# Patient Record
Sex: MALE | Race: WHITE | NOT HISPANIC OR LATINO | Employment: OTHER | URBAN - METROPOLITAN AREA
[De-identification: names, ages, dates, MRNs, and addresses within clinical notes are randomized per-mention and may not be internally consistent; named-entity substitution may affect disease eponyms.]

---

## 2017-02-21 DIAGNOSIS — R35.1 NOCTURIA: ICD-10-CM

## 2017-02-21 DIAGNOSIS — Z12.5 SPECIAL SCREENING FOR MALIGNANT NEOPLASM OF PROSTATE: ICD-10-CM

## 2017-02-21 DIAGNOSIS — M81.0 OSTEOPOROSIS: Primary | ICD-10-CM

## 2017-02-21 DIAGNOSIS — Z13.220 SCREENING FOR LIPID DISORDERS: ICD-10-CM

## 2017-02-27 LAB
25(OH)D3+25(OH)D2 SERPL-MCNC: 20.3 NG/ML (ref 30–100)
ALBUMIN SERPL-MCNC: 4.4 G/DL (ref 3.5–5.2)
ALBUMIN/GLOB SERPL: 2.2 G/DL
ALP SERPL-CCNC: 57 U/L (ref 39–117)
ALT SERPL-CCNC: 45 U/L (ref 1–41)
AST SERPL-CCNC: 31 U/L (ref 1–40)
BILIRUB SERPL-MCNC: 0.6 MG/DL (ref 0.1–1.2)
BUN SERPL-MCNC: 9 MG/DL (ref 8–23)
BUN/CREAT SERPL: 8.3 (ref 7–25)
CALCIUM SERPL-MCNC: 10.7 MG/DL (ref 8.6–10.5)
CHLORIDE SERPL-SCNC: 106 MMOL/L (ref 98–107)
CHOLEST SERPL-MCNC: 160 MG/DL (ref 0–200)
CO2 SERPL-SCNC: 28.7 MMOL/L (ref 22–29)
CREAT SERPL-MCNC: 1.08 MG/DL (ref 0.76–1.27)
GLOBULIN SER CALC-MCNC: 2 GM/DL
GLUCOSE SERPL-MCNC: 91 MG/DL (ref 65–99)
HDLC SERPL-MCNC: 65 MG/DL (ref 40–60)
LDLC SERPL CALC-MCNC: 77 MG/DL (ref 0–100)
LDLC/HDLC SERPL: 1.19 {RATIO}
POTASSIUM SERPL-SCNC: 4.3 MMOL/L (ref 3.5–5.2)
PROT SERPL-MCNC: 6.4 G/DL (ref 6–8.5)
PSA SERPL-MCNC: 2.35 NG/ML (ref 0–4)
SODIUM SERPL-SCNC: 146 MMOL/L (ref 136–145)
TRIGL SERPL-MCNC: 88 MG/DL (ref 0–150)
VLDLC SERPL CALC-MCNC: 17.6 MG/DL (ref 5–40)

## 2017-03-02 ENCOUNTER — OFFICE VISIT (OUTPATIENT)
Dept: FAMILY MEDICINE CLINIC | Facility: CLINIC | Age: 72
End: 2017-03-02

## 2017-03-02 VITALS
WEIGHT: 188 LBS | DIASTOLIC BLOOD PRESSURE: 70 MMHG | RESPIRATION RATE: 16 BRPM | TEMPERATURE: 97.6 F | SYSTOLIC BLOOD PRESSURE: 110 MMHG | BODY MASS INDEX: 25.47 KG/M2 | HEIGHT: 72 IN

## 2017-03-02 DIAGNOSIS — R74.01 ELEVATED ALT MEASUREMENT: ICD-10-CM

## 2017-03-02 DIAGNOSIS — E83.52 CALCIUM BLOOD INCREASED: ICD-10-CM

## 2017-03-02 DIAGNOSIS — M81.0 OSTEOPOROSIS: Primary | ICD-10-CM

## 2017-03-02 DIAGNOSIS — E55.9 VITAMIN D DEFICIENCY: ICD-10-CM

## 2017-03-02 DIAGNOSIS — M50.30 DEGENERATION OF INTERVERTEBRAL DISC OF CERVICAL REGION: ICD-10-CM

## 2017-03-02 PROCEDURE — 99213 OFFICE O/P EST LOW 20 MIN: CPT

## 2017-03-02 NOTE — PROGRESS NOTES
Subjective   Kendall Her is a 71 y.o. male. Patient is here today vitain D deficieyfor   Chief Complaint   Patient presents with   • Follow-up     screening lab results; osteoporosis          Vitals:    03/02/17 0846   BP: 110/70   Resp: 16   Temp: 97.6 °F (36.4 °C)     The following portions of the patient's history were reviewed and updated as appropriate: allergies, current medications, past family history, past medical history, past social history, past surgical history and problem list.    Past Medical History   Diagnosis Date   • Osteopenia      lumbar spine   • Osteoporosis      left femoral neck   • Shoulder pain, left       No Known Allergies   Social History     Social History   • Marital status:      Spouse name: N/A   • Number of children: N/A   • Years of education: N/A     Occupational History   • Not on file.     Social History Main Topics   • Smoking status: Never Smoker   • Smokeless tobacco: Not on file   • Alcohol use Yes   • Drug use: Not on file   • Sexual activity: Not on file     Other Topics Concern   • Not on file     Social History Narrative      No current outpatient prescriptions on file.     Objective     History of Present Illness   The patient is here today for follow-up on osteoporosis, vitamin D deficiency, chronic neck pain, and diet-controlled hyperlipidemia    Review of Systems   Constitutional: Negative.    HENT: Negative.    Respiratory: Negative for cough, shortness of breath and wheezing.    Cardiovascular: Negative for chest pain, palpitations and leg swelling.   Gastrointestinal: Negative for abdominal pain, blood in stool and nausea.   Genitourinary:        Mild urinary hesitancy but no nocturia   Musculoskeletal:        Occasional left inner thigh pain, which is mild in nature   Neurological: Negative.    Psychiatric/Behavioral: Negative.        Physical Exam   Constitutional: He appears well-developed and well-nourished.   Mildly overweight   Neck:   Carotid  pulses normal   Cardiovascular: Normal rate, regular rhythm and normal heart sounds.    Pulmonary/Chest: Effort normal and breath sounds normal. No respiratory distress. He has no wheezes. He has no rales.   Abdominal: Soft. Bowel sounds are normal.   Musculoskeletal: Normal range of motion.   No direct tenderness in the left thigh area.  No varicosities in the thigh but he does have varicosities in the lower leg which are not inflamed or tender.  Pulses normal   Psychiatric: He has a normal mood and affect.   Nursing note and vitals reviewed.      ASSESSMENT  #1 osteoporosis         #2 vitamin D deficiency         #3 diet-controlled hyperlipidemia          #4 frequent neck pain secondary to degenerative disc disease of the cervical spine         Discu.SSION/SUMMARY    the patient's vital signs are normal today.  The CMP was normal except for slightly elevated sodium of 146, slightly elevated calcium at 10.7, and slightly elevated ALT at 45.. Total cholesterol is 160, triglycerides 88, HDL cholesterol 65, and LDL cholesterol is 77.  PSA remains normal at 2.350.  Vitamin D level is still low at 20.3 despite the patient taking a combination calcium and vitamin D pill daily.  I have asked him to take an additional vitamin D3 pill, 2000 units, daily.  The patient does have some mild intermittent left inner thigh discomfort which does not sound to be a radiculopathy nor vascular insufficiency type of problem.  The patient will let us know if these symptoms worsen.  I did recommend that he have vascular screening tests done at [facility]  .    PLAN   CMP, vitamin D, nonfasting lab only in 3 months   No Follow-up on file.

## 2017-05-31 DIAGNOSIS — E55.9 VITAMIN D DEFICIENCY: Primary | ICD-10-CM

## 2017-05-31 DIAGNOSIS — M81.0 OSTEOPOROSIS: ICD-10-CM

## 2017-06-02 LAB
25(OH)D3+25(OH)D2 SERPL-MCNC: 31.3 NG/ML (ref 30–100)
ALBUMIN SERPL-MCNC: 4.5 G/DL (ref 3.5–5.2)
ALBUMIN/GLOB SERPL: 2 G/DL
ALP SERPL-CCNC: 59 U/L (ref 39–117)
ALT SERPL-CCNC: 32 U/L (ref 1–41)
AST SERPL-CCNC: 29 U/L (ref 1–40)
BILIRUB SERPL-MCNC: 0.7 MG/DL (ref 0.1–1.2)
BUN SERPL-MCNC: 11 MG/DL (ref 8–23)
BUN/CREAT SERPL: 10.9 (ref 7–25)
CALCIUM SERPL-MCNC: 10.9 MG/DL (ref 8.6–10.5)
CHLORIDE SERPL-SCNC: 106 MMOL/L (ref 98–107)
CO2 SERPL-SCNC: 27.7 MMOL/L (ref 22–29)
CREAT SERPL-MCNC: 1.01 MG/DL (ref 0.76–1.27)
GLOBULIN SER CALC-MCNC: 2.2 GM/DL
GLUCOSE SERPL-MCNC: 73 MG/DL (ref 65–99)
POTASSIUM SERPL-SCNC: 3.8 MMOL/L (ref 3.5–5.2)
PROT SERPL-MCNC: 6.7 G/DL (ref 6–8.5)
SODIUM SERPL-SCNC: 145 MMOL/L (ref 136–145)

## 2017-06-13 ENCOUNTER — TELEPHONE (OUTPATIENT)
Dept: FAMILY MEDICINE CLINIC | Facility: CLINIC | Age: 72
End: 2017-06-13

## 2017-06-13 NOTE — TELEPHONE ENCOUNTER
Patient notified    ----- Message from Ron Baires MD sent at 6/8/2017  1:26 PM EDT -----  Please tell patient that his vitamin D level has improved to just into the normal range but he definitely needs to continue taking vitamin D on a regular basis.  Tell him that his calcium level was very slightly elevated but his liver tests are both now normal.  I would like to recheck a vitamin D level and a CMP in 6 months

## 2018-12-27 DIAGNOSIS — E78.00 ELEVATED CHOLESTEROL: Primary | ICD-10-CM

## 2018-12-27 DIAGNOSIS — M85.80 OSTEOPENIA, UNSPECIFIED LOCATION: ICD-10-CM

## 2018-12-28 LAB
25(OH)D3+25(OH)D2 SERPL-MCNC: 40.6 NG/ML (ref 30–100)
ALBUMIN SERPL-MCNC: 3.9 G/DL (ref 3.5–5.2)
ALBUMIN/GLOB SERPL: 1.6 G/DL
ALP SERPL-CCNC: 68 U/L (ref 39–117)
ALT SERPL-CCNC: 35 U/L (ref 1–41)
AST SERPL-CCNC: 24 U/L (ref 1–40)
BILIRUB SERPL-MCNC: 0.4 MG/DL (ref 0.1–1.2)
BUN SERPL-MCNC: 14 MG/DL (ref 8–23)
BUN/CREAT SERPL: 13.2 (ref 7–25)
CALCIUM SERPL-MCNC: 10.9 MG/DL (ref 8.6–10.5)
CHLORIDE SERPL-SCNC: 106 MMOL/L (ref 98–107)
CHOLEST SERPL-MCNC: 146 MG/DL (ref 0–200)
CO2 SERPL-SCNC: 29.3 MMOL/L (ref 22–29)
CREAT SERPL-MCNC: 1.06 MG/DL (ref 0.76–1.27)
GLOBULIN SER CALC-MCNC: 2.4 GM/DL
GLUCOSE SERPL-MCNC: 86 MG/DL (ref 65–99)
HDLC SERPL-MCNC: 54 MG/DL (ref 40–60)
LDLC SERPL CALC-MCNC: 75 MG/DL (ref 0–100)
LDLC/HDLC SERPL: 1.38 {RATIO}
POTASSIUM SERPL-SCNC: 4.6 MMOL/L (ref 3.5–5.2)
PROT SERPL-MCNC: 6.3 G/DL (ref 6–8.5)
SODIUM SERPL-SCNC: 144 MMOL/L (ref 136–145)
TRIGL SERPL-MCNC: 87 MG/DL (ref 0–150)
VLDLC SERPL CALC-MCNC: 17.4 MG/DL (ref 5–40)

## 2019-01-08 ENCOUNTER — OFFICE VISIT (OUTPATIENT)
Dept: FAMILY MEDICINE CLINIC | Facility: CLINIC | Age: 74
End: 2019-01-08

## 2019-01-08 ENCOUNTER — RESULTS ENCOUNTER (OUTPATIENT)
Dept: FAMILY MEDICINE CLINIC | Facility: CLINIC | Age: 74
End: 2019-01-08

## 2019-01-08 VITALS
HEART RATE: 67 BPM | OXYGEN SATURATION: 99 % | WEIGHT: 185 LBS | TEMPERATURE: 97.4 F | DIASTOLIC BLOOD PRESSURE: 68 MMHG | BODY MASS INDEX: 25.06 KG/M2 | HEIGHT: 72 IN | RESPIRATION RATE: 18 BRPM | SYSTOLIC BLOOD PRESSURE: 104 MMHG

## 2019-01-08 DIAGNOSIS — M81.0 OSTEOPOROSIS, UNSPECIFIED OSTEOPOROSIS TYPE, UNSPECIFIED PATHOLOGICAL FRACTURE PRESENCE: ICD-10-CM

## 2019-01-08 DIAGNOSIS — E55.9 VITAMIN D DEFICIENCY: ICD-10-CM

## 2019-01-08 DIAGNOSIS — Z12.5 SPECIAL SCREENING FOR MALIGNANT NEOPLASM OF PROSTATE: ICD-10-CM

## 2019-01-08 DIAGNOSIS — Z11.59 NEED FOR HEPATITIS C SCREENING TEST: ICD-10-CM

## 2019-01-08 DIAGNOSIS — Z12.11 SCREENING FOR COLON CANCER: ICD-10-CM

## 2019-01-08 DIAGNOSIS — E83.52 HYPERCALCEMIA: Primary | ICD-10-CM

## 2019-01-08 DIAGNOSIS — E78.5 DIET-CONTROLLED HYPERLIPIDEMIA: ICD-10-CM

## 2019-01-08 DIAGNOSIS — E83.52 CALCIUM BLOOD INCREASED: ICD-10-CM

## 2019-01-08 PROBLEM — G47.62 NOCTURNAL LEG CRAMPS: Status: ACTIVE | Noted: 2019-01-08

## 2019-01-08 PROBLEM — R74.01 ELEVATED ALT MEASUREMENT: Status: RESOLVED | Noted: 2017-03-02 | Resolved: 2019-01-08

## 2019-01-08 PROCEDURE — 99213 OFFICE O/P EST LOW 20 MIN: CPT

## 2019-01-08 NOTE — PROGRESS NOTES
Tayo Her is a 73 y.o. male. Patient is here today for No chief complaint on file.         Vitals:    01/08/19 0911   BP: 104/68   Pulse: 67   Resp: 18   Temp: 97.4 °F (36.3 °C)   SpO2: 99%     The following portions of the patient's history were reviewed and updated as appropriate: allergies, current medications, past family history, past medical history, past social history, past surgical history and problem list.    Past Medical History:   Diagnosis Date   • Osteopenia     lumbar spine   • Osteoporosis     left femoral neck   • Shoulder pain, left       No Known Allergies   Social History     Socioeconomic History   • Marital status:      Spouse name: Not on file   • Number of children: Not on file   • Years of education: Not on file   • Highest education level: Not on file   Social Needs   • Financial resource strain: Not on file   • Food insecurity - worry: Not on file   • Food insecurity - inability: Not on file   • Transportation needs - medical: Not on file   • Transportation needs - non-medical: Not on file   Occupational History   • Not on file   Tobacco Use   • Smoking status: Never Smoker   Substance and Sexual Activity   • Alcohol use: Yes   • Drug use: Not on file   • Sexual activity: Not on file   Other Topics Concern   • Not on file   Social History Narrative   • Not on file      No current outpatient medications on file.     Objective     History of Present Illness   The patient is here today for follow-up on diet-controlled hypercholesterolemia, vitamin D deficiency, hypercalcemia, and osteoporosis    Review of Systems   Constitutional: Negative for activity change, appetite change, chills and fatigue.   HENT: Negative.    Respiratory: Negative for cough, shortness of breath and wheezing.    Cardiovascular: Negative for chest pain, palpitations and leg swelling.   Gastrointestinal: Negative for abdominal pain, blood in stool, constipation and diarrhea.   Genitourinary:  Negative.    Musculoskeletal:        The patient states that he is having fairly frequent nocturnal leg cramps in the calf muscles recently   Neurological: Negative for dizziness, weakness, numbness and headaches.   Hematological: Negative.    Psychiatric/Behavioral: Negative.        Physical Exam   Constitutional: He is oriented to person, place, and time. He appears well-developed and well-nourished.   Eyes: Pupils are equal, round, and reactive to light.   Neck: No thyromegaly present.   Carotid pulses normal   Cardiovascular: Normal rate, regular rhythm and normal heart sounds.   Pulmonary/Chest: Effort normal and breath sounds normal. No respiratory distress. He has no wheezes. He has no rales.   Abdominal: Soft. Bowel sounds are normal.   Musculoskeletal: Normal range of motion.   Neurological: He is alert and oriented to person, place, and time.   Skin: Skin is warm and dry.   Psychiatric: He has a normal mood and affect.   Nursing note reviewed.      ASSESSMENT  #1 diet-controlled hypercholesterolemia                    #2 vitamin D deficiency                     #3 mild hypercalcemia                  #4 osteoporosis                 #5 nocturnal leg cramps    DISCUSSION/SUMMARY    Patient's vital signs are normal.  CMP is normal except for elevated serum calcium level of 10.9.  The patient has had several mildly elevated calciums in the past.  Total cholesterol is 146, triglycerides 87, HDL cholesterol 54, and LDL cholesterol is 75.  Vitamin D level was 40.6.    The patient generally feels well but has had some difficulty with nocturnal leg cramps in the early morning hours.  I recommended to the patient that he try several ounces of tonic water in the evening and to gently increase his fluid intake during the day to see if it helps his leg cramps.    The patient was instructed to increase his vitamin D to 2000 international units daily instead of 1000 international units.  Today I am going to have the  patient have more labs drawn and we will do a parathyroid hormone level, PSA and hepatitis C antibody level.    The patient has never had colorectal  cancer screening and really does not want to have a colonoscopy.  I talked to the patient about the colo-guard stool test and he is willing to do this test.  We will submit his name to do the col[employer] company.    PLAN  Recheck in one year with CMP, lipid panel and PSA as well as vitamin D level  No Follow-up on file.

## 2019-01-09 LAB
HCV AB S/CO SERPL IA: <0.1 S/CO RATIO (ref 0–0.9)
PSA SERPL-MCNC: 3.71 NG/ML (ref 0–4)
PTH-INTACT SERPL-MCNC: 167 PG/ML (ref 15–65)

## 2019-01-16 ENCOUNTER — TELEPHONE (OUTPATIENT)
Dept: FAMILY MEDICINE CLINIC | Facility: CLINIC | Age: 74
End: 2019-01-16

## 2019-01-16 DIAGNOSIS — E34.9 ELEVATED PARATHYROID HORMONE: Primary | ICD-10-CM

## 2019-01-16 NOTE — TELEPHONE ENCOUNTER
Patient notified and is ok with seeing someone in Dr. Gordon's group.    ----- Message from Ron Baires MD sent at 1/15/2019  1:15 PM EST -----  Tell patient that his hepatitis C antibody level was normal.  Tell him that his PSA was still in the normal range at 3.7, less than 4 is normal.  Tell him that his parathyroid gland hormone level was elevated and this suggests that he may have an issue with his parathyroid glands which are behind his thyroid gland.  The appeared to be overactive as happens with benign tumors of these small glands, so we need to send him to someone to check on this and possibly remove all or some of these parathyroid glands.  If the patient is okay with this I will refer him to Dr. Gordon's group for evaluation.

## 2019-02-17 NOTE — PROGRESS NOTES
SURGERY  Kendall Her   1945 02/18/19    Chief Complaint:  hyperparathyroidism    HPI    Patient is a very nice 73 y.o. male referred by Dr Martinez Baires with osteoporosis, hypercalcemia, and vit D deficiency.  His symptoms include neck pain, which has been there for years, which lead to a bone density (~2016) with osteoporosis diagnosis.  After that diagnosis, he was taking 2000 units vit D and 1500 mg calcium daily.  His vit D in 2016 was low.  He has had several broken bones in his life, accidental, but 5 total.    Her calcium is elevated to 10.9, intact , vit D now corrected at 40.6.  His calcium has been elevated at least 3 years.      He denies fatigue although he does say that he is sleeping a little more.  He has never had any kidney stones, has not had pancreatitis, or issues with depression.  His kidney function is normal.    He has not had any workup as to the location of the parathyroid.  We discussed today that most individuals have 4 parathyroids and parathyroid disease is usually either a single adenoma or 4 glands with some element of hyperplasia.  We also discussed that hyperparathyroidism can be primary or secondary.    Past Medical History:   Diagnosis Date   • Colon cancer screening 01/14/2019    Cologuard testing: Negative results   • Osteopenia     lumbar spine   • Osteoporosis     left femoral neck   • Shoulder pain, left      No past surgical history on file.  Family History   Problem Relation Age of Onset   • Alzheimer's disease Mother    • Alzheimer's disease Father      Social History     Socioeconomic History   • Marital status:      Spouse name: Not on file   • Number of children: Not on file   • Years of education: Not on file   • Highest education level: Not on file   Social Needs   • Financial resource strain: Not on file   • Food insecurity - worry: Not on file   • Food insecurity - inability: Not on file   • Transportation needs - medical: Not on file   •  "Transportation needs - non-medical: Not on file   Occupational History   • Not on file   Tobacco Use   • Smoking status: Never Smoker   • Smokeless tobacco: Never Used   Substance and Sexual Activity   • Alcohol use: Yes     Alcohol/week: 1.8 - 3.0 oz     Types: 3 - 5 Cans of beer per week     Comment: weekly   • Drug use: No   • Sexual activity: Defer   Other Topics Concern   • Not on file   Social History Narrative   • Not on file         Current Outpatient Medications:   •  Calcium-Phosphorus-Vitamin D (CALCIUM GUMMIES PO), Take 2-3 each by mouth Daily., Disp: , Rfl:   •  Cholecalciferol (VITAMIN D3) 2000 units capsule, Take 2,000 Units by mouth Daily., Disp: , Rfl:   •  naproxen sodium (ALEVE) 220 MG tablet, Take 220 mg by mouth As Needed for Mild Pain  or Headache., Disp: , Rfl:     Allergies   Allergen Reactions   • Peanut-Containing Drug Products Swelling     Patient states after eating some (more than a few) his throat seems to contract     Review of Systems   HENT: Positive for dental problem, rhinorrhea, sneezing and sore throat.    Genitourinary: Positive for urgency.   Musculoskeletal: Positive for neck pain and neck stiffness.   All other systems reviewed and are negative.      Vitals:    02/18/19 1249   Pulse: 73   SpO2: 97%   Weight: 85 kg (187 lb 6.4 oz)   Height: 182.9 cm (72\")       PHYSICAL EXAM:    Pulse 73   Ht 182.9 cm (72\")   Wt 85 kg (187 lb 6.4 oz)   SpO2 97%   BMI 25.42 kg/m²   Body mass index is 25.42 kg/m².    Constitutional: well developed, well nourished, appears  healthy, appears younger than stated age  Eyes: sclera nonicteric, conjunctiva not injected   ENMT: Hearing intact, trachea midline, thyroid without masses, no excellent skin crease  CVS: RRR, no murmur, peripheral edema not present  Respiratory: CTA, normal respiratory effort   Gastrointestinal: no hepatosplenomegaly, abdomen soft, nontender, abdominal hernia not present, no guarding, no rebound   Genitourinary: inguinal " hernia not detected  Musculoskeletal: gait normal, muscle mass normal  Skin: warm and dry, no rashes visible  Neurological: awake and alert, seems to have reasonable capacity for understanding for medical decision making  Psychiatric: appears to have reasonable judgement, pleasant  Lymphatics: no cervical adenopathy    Radiographic Findings: Bone scan 2016 with osteoporosis    Lab Findings: Calcium 10.9, intact , vitamin D 40.6    Pamphlet reviewed: parathyroid surgery    IMPRESSION:  · Hyperparathyroidism, almost certainly primary  · Hypercalcemia, long-standing  · Vit d deficiency, now corrected.  · Osteoporosis, since at least 2016   · Symptoms of fatigue    PLAN:    · SPECT CT scan at [facility].  This is not offered at [facility] and will help us to proceed in a minimally invasive method.  Patient to phone for results.   The risk of parathyroid surgery were discussed with the patient including bleeding, infection, recurrent laryngeal nerve injury, hypocalcemia potentially necessitating temporary or permanent supplementation with calcium and/or activated vitamin D, with repeated labs.  We also discussed the accuracy rate of pre op studies not being ideal and the potential that the affected gland may not be located and hypercalcemia may remain.  Of course, scarring will be present.  · We discussed the intended outpatient nature of this procedure but if the gland cannot be found in the expected location, and/or bilateral exploration is need, there is the possibility of the need for an overnight stay.  In this case, supplementation of a more complex nature will be more likely and for a more extended period.  · In short I told him that he needs surgery, and discussed all the risks.  At this point, he likely will want to come back and discuss further after his SPECT-CT scan will have already talked to him about all of the issues about surgery and about the risks of proceeding and not proceeding.    Milagro Gordon,  MD  02/18/19  5:29 PM    Note started on the day prior to patient's visit, to afford a more efficient and personal visit for the patient    ADDEND  SPECT CT shows a 2 cm right superior parathyroid adenoma, by CT and activity, and a 9 mm left superior parathyroid adenoma by CT and activity.  He needs a follow up office visit to discuss.  Milagro Gordon MD  04/03/19

## 2019-02-18 ENCOUNTER — OFFICE VISIT (OUTPATIENT)
Dept: SURGERY | Facility: CLINIC | Age: 74
End: 2019-02-18

## 2019-02-18 VITALS — WEIGHT: 187.4 LBS | HEIGHT: 72 IN | HEART RATE: 73 BPM | OXYGEN SATURATION: 97 % | BODY MASS INDEX: 25.38 KG/M2

## 2019-02-18 DIAGNOSIS — M81.0 OSTEOPOROSIS, UNSPECIFIED OSTEOPOROSIS TYPE, UNSPECIFIED PATHOLOGICAL FRACTURE PRESENCE: ICD-10-CM

## 2019-02-18 DIAGNOSIS — E55.9 VITAMIN D DEFICIENCY: ICD-10-CM

## 2019-02-18 DIAGNOSIS — E83.52 CALCIUM BLOOD INCREASED: Primary | ICD-10-CM

## 2019-02-18 PROCEDURE — 99204 OFFICE O/P NEW MOD 45 MIN: CPT | Performed by: SURGERY

## 2019-02-18 RX ORDER — NAPROXEN SODIUM 220 MG
220 TABLET ORAL AS NEEDED
COMMUNITY
End: 2020-07-28

## 2019-02-18 RX ORDER — ACETAMINOPHEN 160 MG
2000 TABLET,DISINTEGRATING ORAL DAILY
COMMUNITY
End: 2020-01-14

## 2019-02-25 ENCOUNTER — TELEPHONE (OUTPATIENT)
Dept: SURGERY | Facility: CLINIC | Age: 74
End: 2019-02-25

## 2019-02-25 NOTE — TELEPHONE ENCOUNTER
ADDEND  Stop his calcium but continue vit D.  Milagro Gordon MD  Patient would like to know if he should still be taking vitamin D, and calcium? Please advise.

## 2019-04-14 PROBLEM — E21.0 HYPERPARATHYROID BONE DISEASE: Status: ACTIVE | Noted: 2019-04-14

## 2019-04-14 NOTE — H&P (VIEW-ONLY)
SURGERY  Kendall Her   1945  04/15/19    Chief Complaint:  hyperparathyroidism    HPI    Patient is a very nice 73 y.o. male, who is here in follow up after initial visit in February of this year, to discuss surgery for 2 parathyroid adenomas.  His symtpoms include osteoporosis, hypercalcemia, and vit D deficiency.  His symptoms include neck pain, which has been there for years, which lead to a bone density (~2016) with osteoporosis diagnosis.  After that diagnosis, he was taking 2000 units vit D and 1500 mg calcium daily.  His vit D in 2016 was low.  He has had several broken bones in his life, accidental, but 5 total.    His calcium is elevated to 10.9, intact , vit D now corrected at 40.6.  His calcium has been elevated at least 3 years.      He denies fatigue although he does say that he is sleeping a little more.  He has never had any kidney stones, has not had pancreatitis, or issues with depression.  His kidney function is normal.    We previously discussed that most individuals have 4 parathyroids and parathyroid disease is usually either a single adenoma or 4 glands with some element of hyperplasia.  We also discussed that hyperparathyroidism can be primary or secondary.  He went for a SPECT CT which showed a 2 cm right superior parathyroid adenoma, by CT and activity, and a 9 mm left superior parathyroid adenoma by CT and activity.  He had previously noted that he would want to return and thus is back to discuss    Past Medical History:   Diagnosis Date   • Colon cancer screening 01/14/2019    Cologuard testing: Negative results   • Osteopenia     lumbar spine   • Osteoporosis     left femoral neck   • Shoulder pain, left      No past surgical history on file.  Family History   Problem Relation Age of Onset   • Alzheimer's disease Mother    • Alzheimer's disease Father      Social History     Socioeconomic History   • Marital status:      Spouse name: Not on file   • Number of  "children: Not on file   • Years of education: Not on file   • Highest education level: Not on file   Tobacco Use   • Smoking status: Never Smoker   • Smokeless tobacco: Never Used   Substance and Sexual Activity   • Alcohol use: Yes     Alcohol/week: 1.8 - 3.0 oz     Types: 3 - 5 Cans of beer per week     Comment: weekly   • Drug use: No   • Sexual activity: Defer         Current Outpatient Medications:   •  Cholecalciferol (VITAMIN D3) 2000 units capsule, Take 2,000 Units by mouth Daily., Disp: , Rfl:   •  naproxen sodium (ALEVE) 220 MG tablet, Take 220 mg by mouth As Needed for Mild Pain  or Headache., Disp: , Rfl:     Allergies   Allergen Reactions   • Peanut-Containing Drug Products Swelling     Patient states after eating some (more than a few) his throat seems to contract     Review of Systems   HENT: Positive for dental problem, rhinorrhea, sneezing and sore throat.    Genitourinary: Positive for urgency.   Musculoskeletal: Positive for neck pain and neck stiffness.   All other systems reviewed and are negative.      Vitals:    04/15/19 1612   Pulse: 68   SpO2: 97%   Weight: 83.9 kg (185 lb)   Height: 182.9 cm (72\")       PHYSICAL EXAM:    Pulse 68   Ht 182.9 cm (72\")   Wt 83.9 kg (185 lb)   SpO2 97%   BMI 25.09 kg/m²   Body mass index is 25.09 kg/m².    Constitutional: well developed, well nourished, appears  healthy, appears younger than stated age  Eyes: sclera nonicteric, conjunctiva not injected   ENMT: Hearing intact, trachea midline, thyroid without masses, no excellent skin crease  CVS: RRR, no murmur, peripheral edema not present  Respiratory: CTA, normal respiratory effort   Gastrointestinal: no hepatosplenomegaly, abdomen soft, nontender, abdominal hernia not present, no guarding, no rebound   Genitourinary: inguinal hernia not detected  Musculoskeletal: gait normal, muscle mass normal  Skin: warm and dry, no rashes visible  Neurological: awake and alert, seems to have reasonable capacity for " understanding for medical decision making  Psychiatric: appears to have reasonable judgement, pleasant  Lymphatics: no cervical adenopathy    Radiographic Findings: Bone scan 2016 with osteoporosis    Lab Findings: Calcium 10.9, intact , vitamin D 40.6    Pamphlet reviewed: parathyroid surgery    IMPRESSION:  · Hyperparathyroidism, almost certainly primary.  · Giant 2 cm right superior parathyroid adenoma, residing behind the upper half of the right thyroid lobe.  · Mildly metabolic nearly 1 cm left superior cervical parathyroid adenoma behind the upper third of the left thyroid lobe.  · Hypercalcemia, long-standing  · Vit d deficiency, now corrected.  · Osteoporosis, since at least 2016   · Symptoms of fatigue    PLAN:    · Case request for right superior parathyroid and possibly left superior resection.  We had a long discussion today about whether we should just do 1 of these or do both, or do one and do an intraoperative lab waiting before exploring the other side.  He really does not want to be over aggressive and thus we decided that I would try to look for both but certainly would not go with the intention of biopsying all 4 glands, which will put the patient at increased risk for hypoparathyroidism postoperatively.  We also discussed the fact that he has neck pain, but he cannot tolerate his neck being flexed rather than extended which is the position we will have him in.  His wife was present for this discussion.  The risk of parathyroid surgery were discussed with the patient including bleeding, infection, recurrent laryngeal nerve injury, hypocalcemia potentially necessitating temporary or permanent supplementation with calcium and/or activated vitamin D, with repeated labs.  We also discussed the accuracy rate of pre op studies not being ideal and the potential that the affected gland may not be located and hypercalcemia may remain.  Of course, scarring will be present.  · We discussed the  intended outpatient nature of this procedure but if the gland cannot be found in the expected location, and/or bilateral exploration is need, there is the possibility of the need for an overnight stay.  In this case, supplementation of a more complex nature will be more likely and for a more extended period.    Milagro Gordon MD  4/15/2019  6:57 PM    Note started on the day prior to patient's visit, to afford a more efficient and personal visit for the patient    .

## 2019-04-15 ENCOUNTER — PREP FOR SURGERY (OUTPATIENT)
Dept: OTHER | Facility: HOSPITAL | Age: 74
End: 2019-04-15

## 2019-04-15 ENCOUNTER — OFFICE VISIT (OUTPATIENT)
Dept: SURGERY | Facility: CLINIC | Age: 74
End: 2019-04-15

## 2019-04-15 VITALS — OXYGEN SATURATION: 97 % | HEART RATE: 68 BPM | HEIGHT: 72 IN | WEIGHT: 185 LBS | BODY MASS INDEX: 25.06 KG/M2

## 2019-04-15 DIAGNOSIS — M81.0 OSTEOPOROSIS, UNSPECIFIED OSTEOPOROSIS TYPE, UNSPECIFIED PATHOLOGICAL FRACTURE PRESENCE: Primary | ICD-10-CM

## 2019-04-15 DIAGNOSIS — E83.52 CALCIUM BLOOD INCREASED: Primary | ICD-10-CM

## 2019-04-15 DIAGNOSIS — E21.0 HYPERPARATHYROID BONE DISEASE (HCC): ICD-10-CM

## 2019-04-15 DIAGNOSIS — E83.52 CALCIUM BLOOD INCREASED: ICD-10-CM

## 2019-04-15 PROCEDURE — 99214 OFFICE O/P EST MOD 30 MIN: CPT | Performed by: SURGERY

## 2019-04-15 RX ORDER — ACETAMINOPHEN 325 MG/1
650 TABLET ORAL ONCE
Status: CANCELLED | OUTPATIENT
Start: 2019-04-19 | End: 2019-04-15

## 2019-04-15 RX ORDER — OXYCODONE HCL 10 MG/1
10 TABLET, FILM COATED, EXTENDED RELEASE ORAL ONCE
Status: CANCELLED | OUTPATIENT
Start: 2019-04-19 | End: 2019-04-15

## 2019-04-15 RX ORDER — SODIUM CHLORIDE 0.9 % (FLUSH) 0.9 %
1-10 SYRINGE (ML) INJECTION AS NEEDED
Status: CANCELLED | OUTPATIENT
Start: 2019-04-19

## 2019-04-15 RX ORDER — SODIUM CHLORIDE 0.9 % (FLUSH) 0.9 %
3 SYRINGE (ML) INJECTION EVERY 12 HOURS SCHEDULED
Status: CANCELLED | OUTPATIENT
Start: 2019-04-19

## 2019-04-17 ENCOUNTER — APPOINTMENT (OUTPATIENT)
Dept: PREADMISSION TESTING | Facility: HOSPITAL | Age: 74
End: 2019-04-17

## 2019-04-17 ENCOUNTER — TELEPHONE (OUTPATIENT)
Dept: SURGERY | Facility: CLINIC | Age: 74
End: 2019-04-17

## 2019-04-17 VITALS
BODY MASS INDEX: 25.15 KG/M2 | RESPIRATION RATE: 16 BRPM | HEART RATE: 70 BPM | OXYGEN SATURATION: 97 % | WEIGHT: 185.7 LBS | HEIGHT: 72 IN | SYSTOLIC BLOOD PRESSURE: 113 MMHG | TEMPERATURE: 98.2 F | DIASTOLIC BLOOD PRESSURE: 74 MMHG

## 2019-04-17 DIAGNOSIS — E83.52 CALCIUM BLOOD INCREASED: ICD-10-CM

## 2019-04-17 DIAGNOSIS — E21.0 HYPERPARATHYROID BONE DISEASE (HCC): ICD-10-CM

## 2019-04-17 LAB
ANION GAP SERPL CALCULATED.3IONS-SCNC: 10.7 MMOL/L
BUN BLD-MCNC: 10 MG/DL (ref 8–23)
BUN/CREAT SERPL: 10.3 (ref 7–25)
CALCIUM SPEC-SCNC: 10.7 MG/DL (ref 8.6–10.5)
CALCIUM SPEC-SCNC: 11.3 MG/DL (ref 8.6–10.5)
CHLORIDE SERPL-SCNC: 104 MMOL/L (ref 98–107)
CO2 SERPL-SCNC: 25.3 MMOL/L (ref 22–29)
CREAT BLD-MCNC: 0.97 MG/DL (ref 0.76–1.27)
DEPRECATED RDW RBC AUTO: 38.6 FL (ref 37–54)
ERYTHROCYTE [DISTWIDTH] IN BLOOD BY AUTOMATED COUNT: 12.3 % (ref 12.3–15.4)
GFR SERPL CREATININE-BSD FRML MDRD: 76 ML/MIN/1.73
GLUCOSE BLD-MCNC: 83 MG/DL (ref 65–99)
HCT VFR BLD AUTO: 45.4 % (ref 37.5–51)
HGB BLD-MCNC: 15.4 G/DL (ref 13–17.7)
MCH RBC QN AUTO: 29.4 PG (ref 26.6–33)
MCHC RBC AUTO-ENTMCNC: 33.9 G/DL (ref 31.5–35.7)
MCV RBC AUTO: 86.8 FL (ref 79–97)
PHOSPHATE SERPL-MCNC: 1.9 MG/DL (ref 2.5–4.5)
PLATELET # BLD AUTO: 197 10*3/MM3 (ref 140–450)
PMV BLD AUTO: 11.1 FL (ref 6–12)
POTASSIUM BLD-SCNC: 3.9 MMOL/L (ref 3.5–5.2)
PTH-INTACT SERPL-MCNC: 172 PG/ML (ref 15–65)
RBC # BLD AUTO: 5.23 10*6/MM3 (ref 4.14–5.8)
SODIUM BLD-SCNC: 140 MMOL/L (ref 136–145)
WBC NRBC COR # BLD: 6.81 10*3/MM3 (ref 3.4–10.8)

## 2019-04-17 PROCEDURE — 84100 ASSAY OF PHOSPHORUS: CPT | Performed by: SURGERY

## 2019-04-17 PROCEDURE — 93010 ELECTROCARDIOGRAM REPORT: CPT | Performed by: INTERNAL MEDICINE

## 2019-04-17 PROCEDURE — 93005 ELECTROCARDIOGRAM TRACING: CPT

## 2019-04-17 PROCEDURE — 83970 ASSAY OF PARATHORMONE: CPT | Performed by: SURGERY

## 2019-04-17 PROCEDURE — 36415 COLL VENOUS BLD VENIPUNCTURE: CPT

## 2019-04-17 PROCEDURE — 85027 COMPLETE CBC AUTOMATED: CPT | Performed by: SURGERY

## 2019-04-17 PROCEDURE — 80048 BASIC METABOLIC PNL TOTAL CA: CPT | Performed by: SURGERY

## 2019-04-17 RX ORDER — CHLORHEXIDINE GLUCONATE 500 MG/1
1 CLOTH TOPICAL TAKE AS DIRECTED
COMMUNITY
End: 2019-04-19 | Stop reason: HOSPADM

## 2019-04-17 NOTE — DISCHARGE INSTRUCTIONS
Take the following medications the morning of surgery with a small sip of water:  NONE    Arrive to hospital on your day of surgery at 6:00 AM.    General Instructions:  • Do not eat solid food after midnight the night before surgery.  • You may drink clear liquids day of surgery but must stop at least one hour before your hospital arrival time.  • It is beneficial for you to have a clear drink that contains carbohydrates the day of surgery.  We suggest a 12 to 20 ounce bottle of Gatorade or Powerade for non-diabetic patients or a 12 to 20 ounce bottle of G2 or Powerade Zero for diabetic patients. (Pediatric patients, are not advised to drink a 12 to 20 ounce carbohydrate drink)    Clear liquids are liquids you can see through.  Nothing red in color.     Plain water                               Sports drinks  Sodas                                   Gelatin (Jell-O)  Fruit juices without pulp such as white grape juice and apple juice  Popsicles that contain no fruit or yogurt  Tea or coffee (no cream or milk added)  Gatorade / Powerade  G2 / Powerade Zero    • Infants may have breast milk up to four hours before surgery.  • Infants drinking formula may drink formula up to six hours before surgery.   • Patients who avoid smoking, chewing tobacco and alcohol for 4 weeks prior to surgery have a reduced risk of post-operative complications.  Quit smoking as many days before surgery as you can.  • Do not smoke, use chewing tobacco or drink alcohol the day of surgery.   • If applicable bring your C-PAP/ BI-PAP machine.  • Bring any papers given to you in the doctor’s office.  • Wear clean comfortable clothes and socks.  • Do not wear contact lenses, false eyelashes or make-up.  Bring a case for your glasses.   • Bring crutches or walker if applicable.  • Remove all piercings.  Leave jewelry and any other valuables at home.  • Hair extensions with metal clips must be removed prior to surgery.  • The Pre-Admission Testing  nurse will instruct you to bring medications if unable to obtain an accurate list in Pre-Admission Testing.        If you were given a blood bank ID arm band remember to bring it with you the day of surgery.    Preventing a Surgical Site Infection:  • For 2 to 3 days before surgery, avoid shaving with a razor because the razor can irritate skin and make it easier to develop an infection.    • Any areas of open skin can increase the risk of a post-operative wound infection by allowing bacteria to enter and travel throughout the body.  Notify your surgeon if you have any skin wounds / rashes even if it is not near the expected surgical site.  The area will need assessed to determine if surgery should be delayed until it is healed.  • The night prior to surgery sleep in a clean bed with clean clothing.  Do not allow pets to sleep with you.  • Shower on the morning of surgery using a fresh bar of anti-bacterial soap (such as Dial) and clean washcloth.  Dry with a clean towel and dress in clean clothing.  • Ask your surgeon if you will be receiving antibiotics prior to surgery.  • Make sure you, your family, and all healthcare providers clean their hands with soap and water or an alcohol based hand  before caring for you or your wound.    Day of surgery:  Upon arrival, a Pre-op nurse and Anesthesiologist will review your health history, obtain vital signs, and answer questions you may have.  The only belongings needed at this time will be your home medications and if applicable your C-PAP/BI-PAP machine.  If you are staying overnight your family can leave the rest of your belongings in the car and bring them to your room later.  A Pre-op nurse will start an IV and you may receive medication in preparation for surgery, including something to help you relax.  Your family will be able to see you in the Pre-op area.  While you are in surgery your family should notify the waiting room  if they leave the  waiting room area and provide a contact phone number.    Please be aware that surgery does come with discomfort.  We want to make every effort to control your discomfort so please discuss any uncontrolled symptoms with your nurse.   Your doctor will most likely have prescribed pain medications.      If you are going home after surgery you will receive individualized written care instructions before being discharged.  A responsible adult must drive you to and from the hospital on the day of your surgery and stay with you for 24 hours.    If you are staying overnight following surgery, you will be transported to your hospital room following the recovery period.  [facility] has all private rooms.    You have received a list of surgical assistants for your reference.  If you have any questions please call Pre-Admission Testing at [phone].  Deductibles and co-payments are collected on the day of service. Please be prepared to pay the required co-pay, deductible or deposit on the day of service as defined by your plan.

## 2019-04-19 ENCOUNTER — ANESTHESIA (OUTPATIENT)
Dept: PERIOP | Facility: HOSPITAL | Age: 74
End: 2019-04-19

## 2019-04-19 ENCOUNTER — ANESTHESIA EVENT (OUTPATIENT)
Dept: PERIOP | Facility: HOSPITAL | Age: 74
End: 2019-04-19

## 2019-04-19 ENCOUNTER — HOSPITAL ENCOUNTER (OUTPATIENT)
Facility: HOSPITAL | Age: 74
Setting detail: HOSPITAL OUTPATIENT SURGERY
Discharge: HOME | End: 2019-04-19
Attending: SURGERY | Admitting: SURGERY

## 2019-04-19 VITALS
HEART RATE: 84 BPM | RESPIRATION RATE: 16 BRPM | DIASTOLIC BLOOD PRESSURE: 72 MMHG | BODY MASS INDEX: 24.94 KG/M2 | SYSTOLIC BLOOD PRESSURE: 135 MMHG | HEIGHT: 72 IN | WEIGHT: 184.13 LBS | OXYGEN SATURATION: 98 % | TEMPERATURE: 98.5 F

## 2019-04-19 DIAGNOSIS — E21.0 HYPERPARATHYROID BONE DISEASE (HCC): ICD-10-CM

## 2019-04-19 DIAGNOSIS — E83.52 CALCIUM BLOOD INCREASED: ICD-10-CM

## 2019-04-19 LAB
PTH-INTACT SERPL-SCNC: 173.9 PG/ML (ref 15–65)
PTH-INTACT SERPL-SCNC: 39 PG/ML (ref 15–65)
PTH-INTACT SERPL-SCNC: 81.3 PG/ML (ref 15–65)

## 2019-04-19 PROCEDURE — 60500 EXPLORE PARATHYROID GLANDS: CPT | Performed by: PHYSICIAN ASSISTANT

## 2019-04-19 PROCEDURE — 25010000002 PROPOFOL 10 MG/ML EMULSION: Performed by: NURSE ANESTHETIST, CERTIFIED REGISTERED

## 2019-04-19 PROCEDURE — 25010000002 NEOSTIGMINE PER 0.5 MG: Performed by: NURSE ANESTHETIST, CERTIFIED REGISTERED

## 2019-04-19 PROCEDURE — 25010000002 FENTANYL CITRATE (PF) 100 MCG/2ML SOLUTION: Performed by: NURSE ANESTHETIST, CERTIFIED REGISTERED

## 2019-04-19 PROCEDURE — 88331 PATH CONSLTJ SURG 1 BLK 1SPC: CPT | Performed by: SURGERY

## 2019-04-19 PROCEDURE — 25010000002 MIDAZOLAM PER 1 MG: Performed by: ANESTHESIOLOGY

## 2019-04-19 PROCEDURE — 83970 ASSAY OF PARATHORMONE: CPT | Performed by: SURGERY

## 2019-04-19 PROCEDURE — 60500 EXPLORE PARATHYROID GLANDS: CPT | Performed by: SURGERY

## 2019-04-19 PROCEDURE — 88305 TISSUE EXAM BY PATHOLOGIST: CPT | Performed by: SURGERY

## 2019-04-19 PROCEDURE — 25010000002 ONDANSETRON PER 1 MG: Performed by: NURSE ANESTHETIST, CERTIFIED REGISTERED

## 2019-04-19 PROCEDURE — 25010000002 DEXAMETHASONE PER 1 MG: Performed by: NURSE ANESTHETIST, CERTIFIED REGISTERED

## 2019-04-19 DEVICE — CLIP LIGAT VASC HORIZON TI MD BLU 24CT: Type: IMPLANTABLE DEVICE | Status: FUNCTIONAL

## 2019-04-19 DEVICE — CLIP LIGAT VASC HORIZON TI SM/WD RED 24CT: Type: IMPLANTABLE DEVICE | Status: FUNCTIONAL

## 2019-04-19 RX ORDER — PROMETHAZINE HYDROCHLORIDE 25 MG/1
25 SUPPOSITORY RECTAL ONCE AS NEEDED
Status: DISCONTINUED | OUTPATIENT
Start: 2019-04-19 | End: 2019-04-19 | Stop reason: HOSPADM

## 2019-04-19 RX ORDER — BUPIVACAINE HYDROCHLORIDE AND EPINEPHRINE 5; 5 MG/ML; UG/ML
INJECTION, SOLUTION PERINEURAL AS NEEDED
Status: DISCONTINUED | OUTPATIENT
Start: 2019-04-19 | End: 2019-04-19 | Stop reason: HOSPADM

## 2019-04-19 RX ORDER — EPHEDRINE SULFATE 50 MG/ML
INJECTION, SOLUTION INTRAVENOUS AS NEEDED
Status: DISCONTINUED | OUTPATIENT
Start: 2019-04-19 | End: 2019-04-19 | Stop reason: SURG

## 2019-04-19 RX ORDER — ACETAMINOPHEN 325 MG/1
650 TABLET ORAL ONCE
Status: COMPLETED | OUTPATIENT
Start: 2019-04-19 | End: 2019-04-19

## 2019-04-19 RX ORDER — LABETALOL HYDROCHLORIDE 5 MG/ML
5 INJECTION, SOLUTION INTRAVENOUS
Status: DISCONTINUED | OUTPATIENT
Start: 2019-04-19 | End: 2019-04-19 | Stop reason: HOSPADM

## 2019-04-19 RX ORDER — FLUMAZENIL 0.1 MG/ML
0.2 INJECTION INTRAVENOUS AS NEEDED
Status: DISCONTINUED | OUTPATIENT
Start: 2019-04-19 | End: 2019-04-19 | Stop reason: HOSPADM

## 2019-04-19 RX ORDER — HYDROCODONE BITARTRATE AND ACETAMINOPHEN 7.5; 325 MG/1; MG/1
1 TABLET ORAL ONCE AS NEEDED
Status: COMPLETED | OUTPATIENT
Start: 2019-04-19 | End: 2019-04-19

## 2019-04-19 RX ORDER — FENTANYL CITRATE 50 UG/ML
INJECTION, SOLUTION INTRAMUSCULAR; INTRAVENOUS AS NEEDED
Status: DISCONTINUED | OUTPATIENT
Start: 2019-04-19 | End: 2019-04-19 | Stop reason: SURG

## 2019-04-19 RX ORDER — ONDANSETRON 2 MG/ML
4 INJECTION INTRAMUSCULAR; INTRAVENOUS ONCE AS NEEDED
Status: DISCONTINUED | OUTPATIENT
Start: 2019-04-19 | End: 2019-04-19 | Stop reason: HOSPADM

## 2019-04-19 RX ORDER — DIPHENHYDRAMINE HYDROCHLORIDE 50 MG/ML
12.5 INJECTION INTRAMUSCULAR; INTRAVENOUS
Status: DISCONTINUED | OUTPATIENT
Start: 2019-04-19 | End: 2019-04-19 | Stop reason: HOSPADM

## 2019-04-19 RX ORDER — PROMETHAZINE HYDROCHLORIDE 25 MG/ML
12.5 INJECTION, SOLUTION INTRAMUSCULAR; INTRAVENOUS ONCE AS NEEDED
Status: DISCONTINUED | OUTPATIENT
Start: 2019-04-19 | End: 2019-04-19 | Stop reason: HOSPADM

## 2019-04-19 RX ORDER — DIPHENHYDRAMINE HCL 25 MG
25 CAPSULE ORAL
Status: DISCONTINUED | OUTPATIENT
Start: 2019-04-19 | End: 2019-04-19 | Stop reason: HOSPADM

## 2019-04-19 RX ORDER — FENTANYL CITRATE 50 UG/ML
50 INJECTION, SOLUTION INTRAMUSCULAR; INTRAVENOUS
Status: DISCONTINUED | OUTPATIENT
Start: 2019-04-19 | End: 2019-04-19 | Stop reason: HOSPADM

## 2019-04-19 RX ORDER — OXYCODONE HCL 10 MG/1
10 TABLET, FILM COATED, EXTENDED RELEASE ORAL ONCE
Status: COMPLETED | OUTPATIENT
Start: 2019-04-19 | End: 2019-04-19

## 2019-04-19 RX ORDER — EPHEDRINE SULFATE 50 MG/ML
5 INJECTION, SOLUTION INTRAVENOUS ONCE AS NEEDED
Status: DISCONTINUED | OUTPATIENT
Start: 2019-04-19 | End: 2019-04-19 | Stop reason: HOSPADM

## 2019-04-19 RX ORDER — SODIUM CHLORIDE, SODIUM LACTATE, POTASSIUM CHLORIDE, CALCIUM CHLORIDE 600; 310; 30; 20 MG/100ML; MG/100ML; MG/100ML; MG/100ML
9 INJECTION, SOLUTION INTRAVENOUS CONTINUOUS
Status: DISCONTINUED | OUTPATIENT
Start: 2019-04-19 | End: 2019-04-19 | Stop reason: HOSPADM

## 2019-04-19 RX ORDER — FAMOTIDINE 10 MG/ML
20 INJECTION, SOLUTION INTRAVENOUS ONCE
Status: COMPLETED | OUTPATIENT
Start: 2019-04-19 | End: 2019-04-19

## 2019-04-19 RX ORDER — GLYCOPYRROLATE 0.2 MG/ML
INJECTION INTRAMUSCULAR; INTRAVENOUS AS NEEDED
Status: DISCONTINUED | OUTPATIENT
Start: 2019-04-19 | End: 2019-04-19 | Stop reason: SURG

## 2019-04-19 RX ORDER — CALCITRIOL 0.25 UG/1
0.25 CAPSULE, LIQUID FILLED ORAL DAILY
Qty: 7 CAPSULE | Refills: 0 | Status: SHIPPED | OUTPATIENT
Start: 2019-04-19 | End: 2019-05-07

## 2019-04-19 RX ORDER — MIDAZOLAM HYDROCHLORIDE 1 MG/ML
2 INJECTION INTRAMUSCULAR; INTRAVENOUS
Status: DISCONTINUED | OUTPATIENT
Start: 2019-04-19 | End: 2019-04-19 | Stop reason: HOSPADM

## 2019-04-19 RX ORDER — PROMETHAZINE HYDROCHLORIDE 25 MG/1
25 TABLET ORAL ONCE AS NEEDED
Status: DISCONTINUED | OUTPATIENT
Start: 2019-04-19 | End: 2019-04-19 | Stop reason: HOSPADM

## 2019-04-19 RX ORDER — SODIUM CHLORIDE 0.9 % (FLUSH) 0.9 %
1-10 SYRINGE (ML) INJECTION AS NEEDED
Status: DISCONTINUED | OUTPATIENT
Start: 2019-04-19 | End: 2019-04-19 | Stop reason: HOSPADM

## 2019-04-19 RX ORDER — HYDRALAZINE HYDROCHLORIDE 20 MG/ML
5 INJECTION INTRAMUSCULAR; INTRAVENOUS
Status: DISCONTINUED | OUTPATIENT
Start: 2019-04-19 | End: 2019-04-19 | Stop reason: HOSPADM

## 2019-04-19 RX ORDER — ONDANSETRON 2 MG/ML
INJECTION INTRAMUSCULAR; INTRAVENOUS AS NEEDED
Status: DISCONTINUED | OUTPATIENT
Start: 2019-04-19 | End: 2019-04-19 | Stop reason: SURG

## 2019-04-19 RX ORDER — MIDAZOLAM HYDROCHLORIDE 1 MG/ML
1 INJECTION INTRAMUSCULAR; INTRAVENOUS
Status: DISCONTINUED | OUTPATIENT
Start: 2019-04-19 | End: 2019-04-19 | Stop reason: HOSPADM

## 2019-04-19 RX ORDER — ONDANSETRON 4 MG/1
4 TABLET, FILM COATED ORAL EVERY 8 HOURS PRN
Qty: 20 TABLET | Refills: 0 | Status: SHIPPED | OUTPATIENT
Start: 2019-04-19 | End: 2019-05-08

## 2019-04-19 RX ORDER — LIDOCAINE HYDROCHLORIDE 10 MG/ML
0.5 INJECTION, SOLUTION EPIDURAL; INFILTRATION; INTRACAUDAL; PERINEURAL ONCE AS NEEDED
Status: DISCONTINUED | OUTPATIENT
Start: 2019-04-19 | End: 2019-04-19 | Stop reason: HOSPADM

## 2019-04-19 RX ORDER — MAGNESIUM HYDROXIDE 1200 MG/15ML
LIQUID ORAL AS NEEDED
Status: DISCONTINUED | OUTPATIENT
Start: 2019-04-19 | End: 2019-04-19 | Stop reason: HOSPADM

## 2019-04-19 RX ORDER — NALOXONE HCL 0.4 MG/ML
0.2 VIAL (ML) INJECTION AS NEEDED
Status: DISCONTINUED | OUTPATIENT
Start: 2019-04-19 | End: 2019-04-19 | Stop reason: HOSPADM

## 2019-04-19 RX ORDER — HYDROMORPHONE HYDROCHLORIDE 1 MG/ML
0.5 INJECTION, SOLUTION INTRAMUSCULAR; INTRAVENOUS; SUBCUTANEOUS
Status: DISCONTINUED | OUTPATIENT
Start: 2019-04-19 | End: 2019-04-19 | Stop reason: HOSPADM

## 2019-04-19 RX ORDER — DEXAMETHASONE SODIUM PHOSPHATE 10 MG/ML
INJECTION INTRAMUSCULAR; INTRAVENOUS AS NEEDED
Status: DISCONTINUED | OUTPATIENT
Start: 2019-04-19 | End: 2019-04-19 | Stop reason: SURG

## 2019-04-19 RX ORDER — PROPOFOL 10 MG/ML
VIAL (ML) INTRAVENOUS AS NEEDED
Status: DISCONTINUED | OUTPATIENT
Start: 2019-04-19 | End: 2019-04-19 | Stop reason: SURG

## 2019-04-19 RX ORDER — SODIUM CHLORIDE 0.9 % (FLUSH) 0.9 %
3 SYRINGE (ML) INJECTION EVERY 12 HOURS SCHEDULED
Status: DISCONTINUED | OUTPATIENT
Start: 2019-04-19 | End: 2019-04-19 | Stop reason: HOSPADM

## 2019-04-19 RX ORDER — HYDROCODONE BITARTRATE AND ACETAMINOPHEN 5; 325 MG/1; MG/1
1 TABLET ORAL EVERY 4 HOURS PRN
Qty: 10 TABLET | Refills: 0 | Status: SHIPPED | OUTPATIENT
Start: 2019-04-19 | End: 2019-05-08

## 2019-04-19 RX ORDER — ROCURONIUM BROMIDE 10 MG/ML
INJECTION, SOLUTION INTRAVENOUS AS NEEDED
Status: DISCONTINUED | OUTPATIENT
Start: 2019-04-19 | End: 2019-04-19 | Stop reason: SURG

## 2019-04-19 RX ORDER — LIDOCAINE HYDROCHLORIDE 20 MG/ML
INJECTION, SOLUTION INFILTRATION; PERINEURAL AS NEEDED
Status: DISCONTINUED | OUTPATIENT
Start: 2019-04-19 | End: 2019-04-19 | Stop reason: SURG

## 2019-04-19 RX ORDER — CALCIUM CARBONATE 200(500)MG
2 TABLET,CHEWABLE ORAL DAILY
Qty: 60 TABLET | Refills: 0 | Status: SHIPPED | OUTPATIENT
Start: 2019-04-19 | End: 2019-05-07

## 2019-04-19 RX ORDER — OXYCODONE AND ACETAMINOPHEN 7.5; 325 MG/1; MG/1
1 TABLET ORAL ONCE AS NEEDED
Status: DISCONTINUED | OUTPATIENT
Start: 2019-04-19 | End: 2019-04-19 | Stop reason: HOSPADM

## 2019-04-19 RX ORDER — ACETAMINOPHEN 325 MG/1
650 TABLET ORAL ONCE AS NEEDED
Status: DISCONTINUED | OUTPATIENT
Start: 2019-04-19 | End: 2019-04-19 | Stop reason: HOSPADM

## 2019-04-19 RX ADMIN — FENTANYL CITRATE 50 MCG: 50 INJECTION, SOLUTION INTRAMUSCULAR; INTRAVENOUS at 10:24

## 2019-04-19 RX ADMIN — ROCURONIUM BROMIDE 50 MG: 10 INJECTION INTRAVENOUS at 08:00

## 2019-04-19 RX ADMIN — ONDANSETRON 4 MG: 2 INJECTION INTRAMUSCULAR; INTRAVENOUS at 09:45

## 2019-04-19 RX ADMIN — SODIUM CHLORIDE, POTASSIUM CHLORIDE, SODIUM LACTATE AND CALCIUM CHLORIDE 9 ML/HR: 600; 310; 30; 20 INJECTION, SOLUTION INTRAVENOUS at 06:45

## 2019-04-19 RX ADMIN — ACETAMINOPHEN 650 MG: 325 TABLET, FILM COATED ORAL at 07:02

## 2019-04-19 RX ADMIN — SODIUM CHLORIDE, POTASSIUM CHLORIDE, SODIUM LACTATE AND CALCIUM CHLORIDE: 600; 310; 30; 20 INJECTION, SOLUTION INTRAVENOUS at 09:48

## 2019-04-19 RX ADMIN — FAMOTIDINE 20 MG: 10 INJECTION INTRAVENOUS at 07:02

## 2019-04-19 RX ADMIN — OXYCODONE HYDROCHLORIDE 10 MG: 10 TABLET, FILM COATED, EXTENDED RELEASE ORAL at 07:02

## 2019-04-19 RX ADMIN — PROPOFOL 200 MG: 10 INJECTION, EMULSION INTRAVENOUS at 08:00

## 2019-04-19 RX ADMIN — LIDOCAINE HYDROCHLORIDE 100 MG: 20 INJECTION, SOLUTION INFILTRATION; PERINEURAL at 08:00

## 2019-04-19 RX ADMIN — HYDROCODONE BITARTRATE AND ACETAMINOPHEN 1 TABLET: 7.5; 325 TABLET ORAL at 10:29

## 2019-04-19 RX ADMIN — FENTANYL CITRATE 100 MCG: 50 INJECTION INTRAMUSCULAR; INTRAVENOUS at 08:00

## 2019-04-19 RX ADMIN — EPHEDRINE SULFATE 10 MG: 50 INJECTION INTRAMUSCULAR; INTRAVENOUS; SUBCUTANEOUS at 08:52

## 2019-04-19 RX ADMIN — DEXAMETHASONE SODIUM PHOSPHATE 8 MG: 10 INJECTION INTRAMUSCULAR; INTRAVENOUS at 08:23

## 2019-04-19 RX ADMIN — NEOSTIGMINE METHYLSULFATE 3 MG: 1 INJECTION INTRAMUSCULAR; INTRAVENOUS; SUBCUTANEOUS at 09:45

## 2019-04-19 RX ADMIN — MIDAZOLAM 2 MG: 1 INJECTION INTRAMUSCULAR; INTRAVENOUS at 07:03

## 2019-04-19 RX ADMIN — GLYCOPYRROLATE 0.4 MG: 0.2 INJECTION INTRAMUSCULAR; INTRAVENOUS at 09:45

## 2019-04-19 RX ADMIN — FENTANYL CITRATE 50 MCG: 50 INJECTION, SOLUTION INTRAMUSCULAR; INTRAVENOUS at 10:35

## 2019-04-19 RX ADMIN — SODIUM CHLORIDE, PRESERVATIVE FREE 10 ML: 5 INJECTION INTRAVENOUS at 06:50

## 2019-04-19 NOTE — ANESTHESIA PREPROCEDURE EVALUATION
Anesthesia Evaluation     Patient summary reviewed and Nursing notes reviewed   no history of anesthetic complications:               Airway   Mallampati: II  TM distance: >3 FB  Neck ROM: full  Anterior and Possible difficult intubation  Dental - normal exam     Pulmonary - negative pulmonary ROS    breath sounds clear to auscultation  (-) shortness of breath, sleep apnea, decreased breath sounds, wheezes  Cardiovascular - normal exam  Exercise tolerance: good (4-7 METS)    Rhythm: regular  Rate: normal    (+) hyperlipidemia,   (-) past MI, angina, CHF, orthopnea, PND, SARABIA, PVD      Neuro/Psych  (+) weakness,     (-) seizures, neuromuscular disease, TIA, CVA, dizziness/light headedness, numbness    ROS Comment: Left foot drop - wears a brace  GI/Hepatic/Renal/Endo    (+)  GERD,    (-) liver disease, diabetes    ROS Comment: Hypercalcemic      Musculoskeletal     (+) neck pain,   Abdominal  - normal exam   Substance History - negative use  (-) alcohol use, drug use     OB/GYN negative ob/gyn ROS         Other   (+) arthritis                   Anesthesia Plan    ASA 3     general     intravenous induction   Anesthetic plan, all risks, benefits, and alternatives have been provided, discussed and informed consent has been obtained with: patient.    Plan discussed with CRNA.

## 2019-04-19 NOTE — DISCHARGE INSTRUCTIONS
YOU HAD A PAIN PILL AT 10:29      SEDATION DISCHARGE INSTRUCTIONS.    IMPORTANT: The following information will help you return to your best level of health.  GENERAL ANESTHESIA.  You have had general anesthesia. You were given a medicine to help you go to sleep and not feel pain.    Follow these instructions:   Go right home. Rest quietly at home today, then you can be up and about.   Do not drink alcohol, drive or operate machinery for 24 hours.   Do not make any important decisions or sign any legal papers in the next 24 hours.   Have a RESPONSIBLE PERSON stay with you the rest of today and overnight for your protection and safety.   Start your diet with fluids and light foods (jello, soup, juice, toast). Then eat a normal diet if not nauseated.    Call your doctor if you have:   any blue or gray skin color.   repeated vomiting.   trouble breathing.   any new problems or concerns.

## 2019-04-19 NOTE — ANESTHESIA POSTPROCEDURE EVALUATION
"Patient: Kendall Her    Procedure Summary     Date:  04/19/19 Room / Location:  [facility] OR 06 /  [facility]    Anesthesia Start:  0757 Anesthesia Stop:  1002    Procedure:  right superior parathyroid and left superior parathyroid resection. (N/A Neck) Diagnosis:       Calcium blood increased      Hyperparathyroid bone disease (CMS/HCC)      (Calcium blood increased [E83.52])      (Hyperparathyroid bone disease (CMS/HCC) [E21.0])    Surgeon:  Milagro Gordon MD Provider:  Cal De Los Santos MD    Anesthesia Type:  general ASA Status:  3          Anesthesia Type: general  Last vitals  BP   122/73 (04/19/19 1100)   Temp   36.9 °C (98.5 °F) (04/19/19 1055)   Pulse   86 (04/19/19 1100)   Resp   16 (04/19/19 1100)     SpO2   97 % (04/19/19 1100)     Post Anesthesia Care and Evaluation    Patient location during evaluation: bedside  Patient participation: complete - patient participated  Level of consciousness: awake  Pain management: adequate  Airway patency: patent  Anesthetic complications: No anesthetic complications    Cardiovascular status: acceptable  Respiratory status: acceptable  Hydration status: acceptable    Comments: */73 (BP Location: Right arm, Patient Position: Sitting)   Pulse 86   Temp 36.9 °C (98.5 °F) (Oral)   Resp 16   Ht 182.9 cm (72\")   Wt 83.5 kg (184 lb 2 oz)   SpO2 97%   BMI 24.97 kg/m²         "

## 2019-04-19 NOTE — ANESTHESIA PROCEDURE NOTES
Airway  Urgency: elective    Difficult airway    General Information and Staff    Patient location during procedure: OR  Anesthesiologist: Jonny Thompson MD  CRNA: Nolvia Grayson CRNA    Indications and Patient Condition  Indications for airway management: airway protection    Preoxygenated: yes  Mask difficulty assessment: 2 - vent by mask + OA or adjuvant +/- NMBA    Final Airway Details  Final airway type: endotracheal airway      Successful airway: ETT and reinforced tube  Cuffed: yes   Successful intubation technique: direct laryngoscopy  Facilitating devices/methods: Bougie and cricoid pressure  Endotracheal tube insertion site: oral  Blade: Watson  Blade size: 2  ETT size (mm): 8.0  Cormack-Lehane Classification: grade IIb - view of arytenoids or posterior of glottis only  Placement verified by: chest auscultation and capnometry   Measured from: lips  ETT to lips (cm): 23  Number of attempts at approach: 1    Additional Comments  Atraumatic, MOP to cuff, BSBE, no change to dentition, secured with tape

## 2019-04-19 NOTE — OP NOTE
Operative Note  Parathyroid  04/19/19      Pre-op Diagnosis:   · Hyperparathyroidism, almost certainly primary.  · Giant 2 cm right superior parathyroid adenoma, residing behind the upper half of the right thyroid lobe.  · Mildly metabolic nearly 1 cm left superior cervical parathyroid adenoma behind the upper third of the left thyroid lobe.    Post-op Diagnosis:  · Parathyroid adenoma, giant, right superior  · parathyroid hyperplasia, left superior    Procedure:   · Parathyroid excision right and left superior    Surgeon: Evan    Assistant: adam    Indications:  · Hypercalcemia, long-standing  · Vit d deficiency, now corrected.  · Osteoporosis, since at least 2016   · Symptoms of fatigue    Associated Issues:  · Pre op calcium: 11.3 on 4/17/19  · Pre op PTH: 172 PAT  · Pre op vit D: 40.6 in 12/2018    Findings:   · Intraoperative STAT PTH preoperatively 173.9  · Intraoperative STAT PTH post-resection right superior 81.3  · Intraoperative STAT PTH post-resection right and left superior 39  · Nerve on right identified with certainty and avoided, on the left suspected visualization.  · Gross findings:  Right superior intrathyroidal with capsular attachment to the fascia overlying the right pharyngeal muscle.  Right inferior and left inferior suspected visualization with normal size, not disturbed.  · Pathology frozen:    Right superior 3000 mg, hypercellular   Left superior 242 mg, hypercellular    Recommendations:   · Discharge on calcium supplement tums 2 tabs bid, with one week supply of calcitriol 0.25 micrograms daily.  · Will give prescription for discharge for narcotic though the patient had said he did not care for any.  His wife was pretty adamant that she disagreed with that and wanted him to have something.  · Labs on Monday    EBL: <50 cc    Technique:     General anesthetic was induced.  IV abx were not given.  The neck was extended, and then prepped with Hibiclens and draped sterilely.    The neck  was examined and the skin creases evaluated to determine the best location for the incision, attempting to maximize both operative exposure and post op cosmesis.  I selected a natural crease, and marked.      1/2% Marcaine with epinephrine was used to inject the site.  Incision was made thru the skin and subcutaneous space and then completed with cautery to the cervical fascia.  Flaps were then raised with the facelift retractors and cautery, directly on the anterior aspect of the fascia, both superiorly and inferiorly.  The strap muscles were then divided in the midline.    Dissection was then begun under the strap muscle on the right side.  The bipolar on 15 was used.  If I had not had the reoperative SPECT-CT scan, I likely would have suspected that this mass was just a portion of the thyroid, it being extremely large, at least 3 cm, and within the capsule of the thyroid.  I had a dissection of the vasculature of the thyroid away from the mass, lifting it away from a carefully.  It was at the posterior upper aspect of the right thyroid, and the inferior aspect was fairly straightforward, though the most inferior tip did come close to the nerve which was dissected out and extremely well visualized.  At the superior aspect however it was stuck to the pharyngeus muscle as well as surrounding tissue and it was tedious to dissect this free with the bipolar.  Ultimately I did get it out intact, and we sent it to pathology.  I had to place a couple of small clips along the pharyngeus muscle, and a couple along the inferior aspect where I dissected off of the thyroid.  The middle thyroid vein had been taken down.  No large clips were placed on the right side.    I dissected in the region of the right inferior but did not see anything that looked worrisome and quickly went on to the left side    On the left side, it was much more posterior, not intrathyroidal, but ultimately was found without division of the middle  thyroid vessel.  The nerve was not as well visualized since I did not divide the middle thyroid vein.  The mass was lifted up and a large clip was placed on the base of this.  It was only about a centimeter, but clearly enlarged.  The inferior parathyroid was suspected to be visualized but not dissected free, and appeared normal in size.    PTH levels were drawn throughout as indicated above, and pathology obtained.  I addressed both sides closely to make sure there was no bleeding, and this took quite a bit of time on the right..  Surgicel was placed.    The wound was then closed with a running 3-0 Vicryl to the midline strap muscles, followed by interrupted 3-0 vicryl to the platysma, interrupted 3-0 vicryl to the subcutaneous, and running 5-0 vicryl to the skin.  Dermabond was applied to the wound.    Milagro Gordon MD  04/19/19  10:21 AM

## 2019-04-22 ENCOUNTER — LAB (OUTPATIENT)
Dept: LAB | Facility: HOSPITAL | Age: 74
End: 2019-04-22

## 2019-04-22 DIAGNOSIS — E83.52 CALCIUM BLOOD INCREASED: ICD-10-CM

## 2019-04-22 DIAGNOSIS — E21.0 HYPERPARATHYROID BONE DISEASE (HCC): Primary | ICD-10-CM

## 2019-04-22 DIAGNOSIS — E21.0 HYPERPARATHYROID BONE DISEASE (HCC): ICD-10-CM

## 2019-04-22 LAB
CALCIUM SPEC-SCNC: 9.5 MG/DL (ref 8.6–10.5)
CYTO UR: NORMAL
LAB AP CASE REPORT: NORMAL
Lab: NORMAL
PATH REPORT.FINAL DX SPEC: NORMAL
PATH REPORT.GROSS SPEC: NORMAL
PTH-INTACT SERPL-MCNC: 34.6 PG/ML (ref 15–65)

## 2019-04-22 PROCEDURE — 83970 ASSAY OF PARATHORMONE: CPT

## 2019-04-22 PROCEDURE — 82310 ASSAY OF CALCIUM: CPT

## 2019-04-22 PROCEDURE — 36415 COLL VENOUS BLD VENIPUNCTURE: CPT

## 2019-04-22 NOTE — PROGRESS NOTES
Beth (surgery office liaison),    Please call patient to inform him that his calcium is normal, as is his PTH.  He should complete his calcitriol for 7 days, which it was written for.   He should continue his calcium for another week and then stop.  He should get labs in about 2 weeks and if normal, he will be done with labs.  Orders for labs entered.

## 2019-04-23 ENCOUNTER — TELEPHONE (OUTPATIENT)
Dept: SURGERY | Facility: CLINIC | Age: 74
End: 2019-04-23

## 2019-04-23 NOTE — TELEPHONE ENCOUNTER
----- Message from Milagro Gordon MD sent at 4/22/2019  6:29 PM EDT -----  Beth (surgery office liaison),    Please call patient to inform him that his calcium is normal, as is his PTH.  He should complete his calcitriol for 7 days, which it was written for.   He should continue his calcium for another week and then stop.  He should get labs in about 2 weeks and if normal, he will be done with labs.  Orders for labs entered.

## 2019-04-24 ENCOUNTER — TELEPHONE (OUTPATIENT)
Dept: SURGERY | Facility: CLINIC | Age: 74
End: 2019-04-24

## 2019-05-07 ENCOUNTER — TELEPHONE (OUTPATIENT)
Dept: SURGERY | Facility: CLINIC | Age: 74
End: 2019-05-07

## 2019-05-07 ENCOUNTER — LAB (OUTPATIENT)
Dept: LAB | Facility: HOSPITAL | Age: 74
End: 2019-05-07

## 2019-05-07 DIAGNOSIS — E21.0 HYPERPARATHYROID BONE DISEASE (HCC): ICD-10-CM

## 2019-05-07 LAB
CALCIUM SPEC-SCNC: 9.3 MG/DL (ref 8.6–10.5)
PTH-INTACT SERPL-MCNC: 39.2 PG/ML (ref 15–65)

## 2019-05-07 PROCEDURE — 82310 ASSAY OF CALCIUM: CPT

## 2019-05-07 PROCEDURE — 83970 ASSAY OF PARATHORMONE: CPT

## 2019-05-07 PROCEDURE — 36415 COLL VENOUS BLD VENIPUNCTURE: CPT

## 2019-05-07 NOTE — PROGRESS NOTES
Beth (surgery liaison)  Please let him know that his labs are completely normal, and these should have been taken when he was off calcitriol and calcium supplement.  I have DC'd those meds in his med list.  He was previously on vit d thru another physician.  I presume that was Dr Baires.  He should follow up with him about the vit D.  i'll see him prn.  tx  Dr salas

## 2019-05-07 NOTE — TELEPHONE ENCOUNTER
----- Message from Milagro Gordon MD sent at 5/7/2019 11:59 AM EDT -----  Beth (surgery liaison)  Please let him know that his labs are completely normal, and these should have been taken when he was off calcitriol and calcium supplement.  I have DC'd those meds in his med list.  He was previously on vit d thru another physician.  I presume that was Dr Baires.  He should follow up with him about the vit D.  i'll see him prn.  tx  Dr salas

## 2019-05-08 ENCOUNTER — OFFICE VISIT (OUTPATIENT)
Dept: SURGERY | Facility: CLINIC | Age: 74
End: 2019-05-08

## 2019-05-08 DIAGNOSIS — E21.0 HYPERPARATHYROID BONE DISEASE (HCC): Primary | ICD-10-CM

## 2019-05-08 DIAGNOSIS — M81.0 OSTEOPOROSIS, UNSPECIFIED OSTEOPOROSIS TYPE, UNSPECIFIED PATHOLOGICAL FRACTURE PRESENCE: ICD-10-CM

## 2019-05-08 PROCEDURE — 99024 POSTOP FOLLOW-UP VISIT: CPT | Performed by: SURGERY

## 2019-05-08 NOTE — PROGRESS NOTES
SURGERY: [facility]  Post op Visit  Kendall Her  05/08/19    Mr Her presents today after having undergone Surgery 4/19/19, of a right and left superior parathyroid resection.  This was for primary hyperparathyroidism, with a giant 2 cm right superior parathyroid.  His symptoms were fatigue, which unfortunately does not appear to have  Improved.  He also has osteoporosis, which will almost undoubtedly improve now.      His most recent labs are completely normal, he taking 2 tums daily.  He will complete those soon and can stop.  His calcium is 9.3, and PTH 39.  His vit D in 12/2018 was 40.6.  I have advised that he discuss that with Dr Baires.    His incision looks great.  I removed hyperparathyroidism from his problem.  I left the hyperparathyroid bone disease, which will improve with time.     He has never had a c scope.  He has declined.      Milagro Gordon MD  2:16 PM  05/08/19

## 2019-12-31 DIAGNOSIS — E55.9 VITAMIN D DEFICIENCY: ICD-10-CM

## 2019-12-31 DIAGNOSIS — Z12.5 SPECIAL SCREENING FOR MALIGNANT NEOPLASM OF PROSTATE: ICD-10-CM

## 2019-12-31 DIAGNOSIS — E78.5 DIET-CONTROLLED HYPERLIPIDEMIA: ICD-10-CM

## 2020-01-07 LAB
25(OH)D3+25(OH)D2 SERPL-MCNC: 29.5 NG/ML (ref 30–100)
ALBUMIN SERPL-MCNC: 4.4 G/DL (ref 3.5–5.2)
ALBUMIN/GLOB SERPL: 2.2 G/DL
ALP SERPL-CCNC: 51 U/L (ref 39–117)
ALT SERPL-CCNC: 20 U/L (ref 1–41)
AST SERPL-CCNC: 18 U/L (ref 1–40)
BILIRUB SERPL-MCNC: 0.5 MG/DL (ref 0.2–1.2)
BUN SERPL-MCNC: 14 MG/DL (ref 8–23)
BUN/CREAT SERPL: 13.7 (ref 7–25)
CALCIUM SERPL-MCNC: 9 MG/DL (ref 8.6–10.5)
CHLORIDE SERPL-SCNC: 103 MMOL/L (ref 98–107)
CHOLEST SERPL-MCNC: 159 MG/DL (ref 0–200)
CO2 SERPL-SCNC: 30.4 MMOL/L (ref 22–29)
CREAT SERPL-MCNC: 1.02 MG/DL (ref 0.76–1.27)
GLOBULIN SER CALC-MCNC: 2 GM/DL
GLUCOSE SERPL-MCNC: 87 MG/DL (ref 65–99)
HDLC SERPL-MCNC: 56 MG/DL (ref 40–60)
LDLC SERPL CALC-MCNC: 84 MG/DL (ref 0–100)
LDLC/HDLC SERPL: 1.5 {RATIO}
POTASSIUM SERPL-SCNC: 4.5 MMOL/L (ref 3.5–5.2)
PROT SERPL-MCNC: 6.4 G/DL (ref 6–8.5)
PSA SERPL-MCNC: 3.79 NG/ML (ref 0–4)
SODIUM SERPL-SCNC: 143 MMOL/L (ref 136–145)
TRIGL SERPL-MCNC: 95 MG/DL (ref 0–150)
VLDLC SERPL CALC-MCNC: 19 MG/DL

## 2020-01-14 ENCOUNTER — OFFICE VISIT (OUTPATIENT)
Dept: FAMILY MEDICINE CLINIC | Facility: CLINIC | Age: 75
End: 2020-01-14

## 2020-01-14 VITALS
OXYGEN SATURATION: 97 % | BODY MASS INDEX: 25.25 KG/M2 | WEIGHT: 186.4 LBS | DIASTOLIC BLOOD PRESSURE: 70 MMHG | RESPIRATION RATE: 16 BRPM | SYSTOLIC BLOOD PRESSURE: 122 MMHG | TEMPERATURE: 97.4 F | HEIGHT: 72 IN | HEART RATE: 69 BPM

## 2020-01-14 DIAGNOSIS — E78.5 DIET-CONTROLLED HYPERLIPIDEMIA: Primary | ICD-10-CM

## 2020-01-14 DIAGNOSIS — E55.9 VITAMIN D DEFICIENCY: ICD-10-CM

## 2020-01-14 DIAGNOSIS — E83.52 CALCIUM BLOOD INCREASED: ICD-10-CM

## 2020-01-14 PROCEDURE — 99214 OFFICE O/P EST MOD 30 MIN: CPT | Performed by: INTERNAL MEDICINE

## 2020-01-14 NOTE — PROGRESS NOTES
Subjective   Kendall Her is a 74 y.o. male. Patient is here today for follow-up on laboratory studies.  He usually saw Dr. Baires about once a year.  He feels well and has had no chest pain, shortness of breath, edema or myalgias.  He did have an operation for parathyroid adenomas in the summer because of calcium issues and has done well since then.  Chief Complaint   Patient presents with   • Follow-up     Lab Results          Vitals:    01/14/20 0942   BP: 122/70   Pulse: 69   Resp: 16   Temp: 97.4 °F (36.3 °C)   SpO2: 97%     Body mass index is 25.27 kg/m².  The following portions of the patient's history were reviewed and updated as appropriate: allergies, current medications, past family history, past medical history, past social history, past surgical history and problem list.    Past Medical History:   Diagnosis Date   • Acid reflux    • Colon cancer screening 01/14/2019    Cologuard testing: Negative results   • Neck pain    • Osteopenia     lumbar spine   • Osteoporosis     left femoral neck   • Parathyroid adenoma    • Shoulder pain, left       Allergies   Allergen Reactions   • Peanut-Containing Drug Products Swelling     Patient states after eating some (more than a few) his throat seems to contract      Social History     Socioeconomic History   • Marital status:      Spouse name: Not on file   • Number of children: Not on file   • Years of education: Not on file   • Highest education level: Not on file   Tobacco Use   • Smoking status: Never Smoker   • Smokeless tobacco: Never Used   Substance and Sexual Activity   • Alcohol use: Yes     Alcohol/week: 3.0 - 5.0 standard drinks     Types: 3 - 5 Cans of beer per week     Comment: weekly   • Drug use: No   • Sexual activity: Defer        Current Outpatient Medications:   •  naproxen sodium (ALEVE) 220 MG tablet, Take 220 mg by mouth As Needed for Mild Pain  or Headache., Disp: , Rfl:   •  Unable to find, 1 each 1 (One) Time. Med Name: Relief  Factor., Disp: , Rfl:      Objective     History of Present Illness     Review of Systems   Constitutional: Negative.    HENT: Negative.    Eyes: Negative.    Respiratory: Negative.    Cardiovascular: Negative.    Gastrointestinal: Negative.    Genitourinary: Negative.    Musculoskeletal: Negative.    Skin: Negative.    Neurological: Negative.    Psychiatric/Behavioral: Negative.        Physical Exam   Constitutional: He is oriented to person, place, and time. He appears well-developed and well-nourished.   Pleasant, cooperative no acute distress, blood pressure 120/80   HENT:   Head: Normocephalic and atraumatic.   Eyes: Pupils are equal, round, and reactive to light. Conjunctivae are normal. No scleral icterus.   Neck: Normal range of motion. Neck supple. No thyromegaly present.   Cardiovascular: Normal rate, regular rhythm and normal heart sounds.   Pulmonary/Chest: Effort normal and breath sounds normal. No respiratory distress. He has no wheezes. He has no rales.   Musculoskeletal: Normal range of motion. He exhibits no edema.   Lymphadenopathy:     He has no cervical adenopathy.   Neurological: He is alert and oriented to person, place, and time.   Skin: Skin is warm and dry.   Psychiatric: He has a normal mood and affect. His behavior is normal.   Nursing note and vitals reviewed.      ASSESSMENT CMP was essentially completely normal with a calcium of 9.0.  Lipid panel is stable and quite normal.  Vitamin D level was just minimally low at 29.5 and PSA is in the normal range and stable  #1-hyperlipidemia controlled by diet  #2-borderline vitamin D deficiency  #3-status post removal of parathyroid adenomas, now with normal calcium and asymptomatic     Problem List Items Addressed This Visit        Cardiovascular and Mediastinum    Diet-controlled hyperlipidemia - Primary       Digestive    Vitamin D deficiency       Other    RESOLVED: Calcium blood increased          PLAN the patient declines a flu shot and  colonoscopy.  I recommended the shingles and Tdap immunizations to the patient.  I will plan on rechecking him in 1 year unless something develops with a CBC, CMP, lipid panel, PSA, TSH, vitamin D level and PTH, total and free    There are no Patient Instructions on file for this visit.  Return in about 1 year (around 1/14/2021) for with labs.

## 2020-06-26 ENCOUNTER — OFFICE VISIT (OUTPATIENT)
Dept: FAMILY MEDICINE CLINIC | Facility: CLINIC | Age: 75
End: 2020-06-26

## 2020-06-26 VITALS
DIASTOLIC BLOOD PRESSURE: 70 MMHG | BODY MASS INDEX: 24.73 KG/M2 | SYSTOLIC BLOOD PRESSURE: 112 MMHG | OXYGEN SATURATION: 99 % | HEIGHT: 72 IN | HEART RATE: 70 BPM | WEIGHT: 182.6 LBS | TEMPERATURE: 98.7 F

## 2020-06-26 DIAGNOSIS — Z00.00 MEDICARE ANNUAL WELLNESS VISIT, INITIAL: Primary | ICD-10-CM

## 2020-06-26 PROCEDURE — G0439 PPPS, SUBSEQ VISIT: HCPCS | Performed by: NURSE PRACTITIONER

## 2020-06-26 RX ORDER — CHLORAL HYDRATE 500 MG
CAPSULE ORAL
COMMUNITY
Start: 2019-04-12 | End: 2021-01-21

## 2020-06-26 RX ORDER — VIT C/B6/B5/MAGNESIUM/HERB 173 50-5-6-5MG
CAPSULE ORAL
COMMUNITY
Start: 2019-04-12 | End: 2021-01-21

## 2020-06-26 RX ORDER — RESVERA/CHROM/GR.TEA/EGCG/DIG3 50MG-20MCG
CAPSULE ORAL
COMMUNITY
Start: 2019-04-12 | End: 2021-09-16

## 2020-06-26 NOTE — PROGRESS NOTES
The ABCs of the Annual Wellness Visit  Initial Medicare Wellness Visit    Chief Complaint   Patient presents with   • Medicare Wellness-subsequent       Subjective   History of Present Illness:  Kendall Her is a 74 y.o. male who presents for an Initial Medicare Wellness Visit.    HEALTH RISK ASSESSMENT    Recent Hospitalizations:  No hospitalization(s) within the last year.    Current Medical Providers:  Patient Care Team:  Antione Ivan MD as PCP - General (Internal Medicine)    Smoking Status:  Social History     Tobacco Use   Smoking Status Never Smoker   Smokeless Tobacco Never Used       Alcohol Consumption:  Social History     Substance and Sexual Activity   Alcohol Use Yes   • Alcohol/week: 3.0 - 5.0 standard drinks   • Types: 3 - 5 Cans of beer per week    Comment: weekly       Depression Screen:   PHQ-2/PHQ-9 Depression Screening 6/26/2020   Little interest or pleasure in doing things 0   Feeling down, depressed, or hopeless 0   Trouble falling or staying asleep, or sleeping too much 0   Feeling tired or having little energy 0   Poor appetite or overeating 0   Feeling bad about yourself - or that you are a failure or have let yourself or your family down 0   Trouble concentrating on things, such as reading the newspaper or watching television 0   Moving or speaking so slowly that other people could have noticed. Or the opposite - being so fidgety or restless that you have been moving around a lot more than usual 0   Thoughts that you would be better off dead, or of hurting yourself in some way 0   Total Score 0   If you checked off any problems, how difficult have these problems made it for you to do your work, take care of things at home, or get along with other people? Not difficult at all       Fall Risk Screen:  STEADI Fall Risk Assessment was completed, and patient is at LOW risk for falls.Assessment completed on:6/26/2020    Health Habits and Functional and Cognitive Screening:  Functional &  Cognitive Status 6/26/2020   Do you have difficulty preparing food and eating? No   Do you have difficulty bathing yourself, getting dressed or grooming yourself? No   Do you have difficulty using the toilet? No   Do you have difficulty moving around from place to place? No   Do you have trouble with steps or getting out of a bed or a chair? No   Current Diet Limited Junk Food   Dental Exam Up to date   Eye Exam Up to date   Exercise (times per week) 6 times per week   Current Exercise Activities Include Gardening   Do you need help using the phone?  No   Are you deaf or do you have serious difficulty hearing?  No   Do you need help with transportation? No   Do you need help shopping? No   Do you need help preparing meals?  No   Do you need help with housework?  No   Do you need help with laundry? No   Do you need help taking your medications? No   Do you need help managing money? No   Do you ever drive or ride in a car without wearing a seat belt? No   Have you felt unusual stress, anger or loneliness in the last month? No   Who do you live with? Spouse   If you need help, do you have trouble finding someone available to you? No   Have you been bothered in the last four weeks by sexual problems? No   Do you have difficulty concentrating, remembering or making decisions? Yes     Eats heart healthy diet most of the time; eats fish and chicken, limited beef    Does the patient have evidence of cognitive impairment? No    Asprin use counseling:Does not need ASA (and currently is not on it)    Age-appropriate Screening Schedule:  Refer to the list below for future screening recommendations based on patient's age, sex and/or medical conditions. Orders for these recommended tests are listed in the plan section. The patient has been provided with a written plan.    Health Maintenance   Topic Date Due   • TDAP/TD VACCINES (1 - Tdap) 09/04/1956   • ZOSTER VACCINE (1 of 2) 09/04/1996   • DXA SCAN  07/29/2018   • LIPID PANEL   "01/06/2021   • COLONOSCOPY  01/14/2022   • INFLUENZA VACCINE  Discontinued          The following portions of the patient's history were reviewed and updated as appropriate: allergies, current medications, past family history, past medical history, past social history, past surgical history and problem list.    Outpatient Medications Prior to Visit   Medication Sig Dispense Refill   • Misc Natural Products (RESVERATROL DIET) capsule      • Omega-3 1000 MG capsule      • Turmeric 500 MG capsule      • naproxen sodium (ALEVE) 220 MG tablet Take 220 mg by mouth As Needed for Mild Pain  or Headache.     • Unable to find 1 each 1 (One) Time. Med Name: Relief Factor.       No facility-administered medications prior to visit.        Patient Active Problem List   Diagnosis   • Degeneration of intervertebral disc of cervical region   • Neck pain   • Osteoporosis   • Osteopenia   • Vitamin D deficiency   • Diet-controlled hyperlipidemia   • Nocturnal leg cramps   • Hyperparathyroid bone disease (CMS/[facility])       Advanced Care Planning:  ACP discussion was held with the patient during this visit. Patient has an advance directive in EMR which is still valid.     Review of Systems   Constitutional: Negative.    Respiratory: Negative.    Cardiovascular: Negative.        Compared to one year ago, the patient feels his physical health is the same.  Compared to one year ago, the patient feels his mental health is the same.    Reviewed chart for potential of high risk medication in the elderly: yes  Reviewed chart for potential of harmful drug interactions in the elderly:yes    Objective         Vitals:    06/26/20 0820   BP: 112/70   Pulse: 70   Temp: 98.7 °F (37.1 °C)   SpO2: 99%   Weight: 82.8 kg (182 lb 9.6 oz)   Height: 182.9 cm (72\")       Body mass index is 24.77 kg/m².  Discussed the patient's BMI with him. The BMI is in the acceptable range.    Physical Exam   Constitutional: He is oriented to person, place, and time. He " appears well-developed.   HENT:   Head: Normocephalic.   Eyes: Pupils are equal, round, and reactive to light.   Cardiovascular: Normal rate and regular rhythm.   Pulmonary/Chest: Effort normal and breath sounds normal.   Musculoskeletal: Normal range of motion.   Neurological: He is alert and oriented to person, place, and time.   Vitals reviewed.            Assessment/Plan   Medicare Risks and Personalized Health Plan  CMS Preventative Services Quick Reference  Immunizations Discussed/Encouraged (specific immunizations; adacel Tdap and Shingrix )  Osteoprorosis Risk    The above risks/problems have been discussed with the patient.  Pertinent information has been shared with the patient in the After Visit Summary.  Follow up plans and orders are seen below in the Assessment/Plan Section.    Diagnoses and all orders for this visit:    1. Medicare annual wellness visit, initial (Primary)      Follow Up:  Return for follow up as needed with Dr. Ivan.     An After Visit Summary and PPPS were given to the patient.

## 2020-07-24 ENCOUNTER — TELEPHONE (OUTPATIENT)
Dept: FAMILY MEDICINE CLINIC | Facility: CLINIC | Age: 75
End: 2020-07-24

## 2020-07-24 ENCOUNTER — HOSPITAL ENCOUNTER (EMERGENCY)
Facility: HOSPITAL | Age: 75
Discharge: HOME | End: 2020-07-24
Attending: EMERGENCY MEDICINE | Admitting: EMERGENCY MEDICINE

## 2020-07-24 ENCOUNTER — APPOINTMENT (OUTPATIENT)
Dept: GENERAL RADIOLOGY | Facility: HOSPITAL | Age: 75
End: 2020-07-24

## 2020-07-24 VITALS
HEIGHT: 72 IN | BODY MASS INDEX: 24.11 KG/M2 | WEIGHT: 178 LBS | DIASTOLIC BLOOD PRESSURE: 67 MMHG | HEART RATE: 74 BPM | TEMPERATURE: 98.5 F | SYSTOLIC BLOOD PRESSURE: 113 MMHG | OXYGEN SATURATION: 97 % | RESPIRATION RATE: 16 BRPM

## 2020-07-24 DIAGNOSIS — M25.551 ACUTE RIGHT HIP PAIN: Primary | ICD-10-CM

## 2020-07-24 DIAGNOSIS — M79.651 ACUTE PAIN OF RIGHT THIGH: ICD-10-CM

## 2020-07-24 LAB
ALBUMIN SERPL-MCNC: 4.1 G/DL (ref 3.5–5.2)
ALBUMIN/GLOB SERPL: 1.6 G/DL
ALP SERPL-CCNC: 42 U/L (ref 39–117)
ALT SERPL W P-5'-P-CCNC: 23 U/L (ref 1–41)
ANION GAP SERPL CALCULATED.3IONS-SCNC: 6.8 MMOL/L (ref 5–15)
AST SERPL-CCNC: 20 U/L (ref 1–40)
BASOPHILS # BLD AUTO: 0.04 10*3/MM3 (ref 0–0.2)
BASOPHILS NFR BLD AUTO: 0.4 % (ref 0–1.5)
BILIRUB SERPL-MCNC: 0.6 MG/DL (ref 0–1.2)
BILIRUB UR QL STRIP: NEGATIVE
BUN SERPL-MCNC: 10 MG/DL (ref 8–23)
BUN/CREAT SERPL: 11.4 (ref 7–25)
CALCIUM SPEC-SCNC: 8.9 MG/DL (ref 8.6–10.5)
CHLORIDE SERPL-SCNC: 103 MMOL/L (ref 98–107)
CK SERPL-CCNC: 85 U/L (ref 20–200)
CLARITY UR: CLEAR
CO2 SERPL-SCNC: 27.2 MMOL/L (ref 22–29)
COLOR UR: YELLOW
CREAT SERPL-MCNC: 0.88 MG/DL (ref 0.76–1.27)
DEPRECATED RDW RBC AUTO: 40.6 FL (ref 37–54)
EOSINOPHIL # BLD AUTO: 0.03 10*3/MM3 (ref 0–0.4)
EOSINOPHIL NFR BLD AUTO: 0.3 % (ref 0.3–6.2)
ERYTHROCYTE [DISTWIDTH] IN BLOOD BY AUTOMATED COUNT: 12.8 % (ref 12.3–15.4)
GFR SERPL CREATININE-BSD FRML MDRD: 85 ML/MIN/1.73
GLOBULIN UR ELPH-MCNC: 2.5 GM/DL
GLUCOSE SERPL-MCNC: 141 MG/DL (ref 65–99)
GLUCOSE UR STRIP-MCNC: NEGATIVE MG/DL
HCT VFR BLD AUTO: 47.7 % (ref 37.5–51)
HGB BLD-MCNC: 16 G/DL (ref 13–17.7)
HGB UR QL STRIP.AUTO: NEGATIVE
IMM GRANULOCYTES # BLD AUTO: 0.05 10*3/MM3 (ref 0–0.05)
IMM GRANULOCYTES NFR BLD AUTO: 0.5 % (ref 0–0.5)
KETONES UR QL STRIP: NEGATIVE
LEUKOCYTE ESTERASE UR QL STRIP.AUTO: NEGATIVE
LYMPHOCYTES # BLD AUTO: 0.7 10*3/MM3 (ref 0.7–3.1)
LYMPHOCYTES NFR BLD AUTO: 7 % (ref 19.6–45.3)
MCH RBC QN AUTO: 29.2 PG (ref 26.6–33)
MCHC RBC AUTO-ENTMCNC: 33.5 G/DL (ref 31.5–35.7)
MCV RBC AUTO: 87 FL (ref 79–97)
MONOCYTES # BLD AUTO: 0.28 10*3/MM3 (ref 0.1–0.9)
MONOCYTES NFR BLD AUTO: 2.8 % (ref 5–12)
NEUTROPHILS NFR BLD AUTO: 8.85 10*3/MM3 (ref 1.7–7)
NEUTROPHILS NFR BLD AUTO: 89 % (ref 42.7–76)
NITRITE UR QL STRIP: NEGATIVE
NRBC BLD AUTO-RTO: 0 /100 WBC (ref 0–0.2)
PH UR STRIP.AUTO: 7.5 [PH] (ref 5–8)
PLATELET # BLD AUTO: 189 10*3/MM3 (ref 140–450)
PMV BLD AUTO: 10.1 FL (ref 6–12)
POTASSIUM SERPL-SCNC: 4.1 MMOL/L (ref 3.5–5.2)
PROT SERPL-MCNC: 6.6 G/DL (ref 6–8.5)
PROT UR QL STRIP: NEGATIVE
RBC # BLD AUTO: 5.48 10*6/MM3 (ref 4.14–5.8)
SODIUM SERPL-SCNC: 137 MMOL/L (ref 136–145)
SP GR UR STRIP: 1.02 (ref 1–1.03)
UROBILINOGEN UR QL STRIP: NORMAL
WBC # BLD AUTO: 9.95 10*3/MM3 (ref 3.4–10.8)

## 2020-07-24 PROCEDURE — 80053 COMPREHEN METABOLIC PANEL: CPT | Performed by: NURSE PRACTITIONER

## 2020-07-24 PROCEDURE — 25010000002 HYDROMORPHONE 1 MG/ML SOLUTION: Performed by: NURSE PRACTITIONER

## 2020-07-24 PROCEDURE — 25010000002 TRIAMCINOLONE PER 10 MG: Performed by: NURSE PRACTITIONER

## 2020-07-24 PROCEDURE — 72110 X-RAY EXAM L-2 SPINE 4/>VWS: CPT

## 2020-07-24 PROCEDURE — 99284 EMERGENCY DEPT VISIT MOD MDM: CPT

## 2020-07-24 PROCEDURE — 73502 X-RAY EXAM HIP UNI 2-3 VIEWS: CPT

## 2020-07-24 PROCEDURE — 96372 THER/PROPH/DIAG INJ SC/IM: CPT

## 2020-07-24 PROCEDURE — 25010000002 ONDANSETRON PER 1 MG: Performed by: NURSE PRACTITIONER

## 2020-07-24 PROCEDURE — 85025 COMPLETE CBC W/AUTO DIFF WBC: CPT | Performed by: NURSE PRACTITIONER

## 2020-07-24 PROCEDURE — 96375 TX/PRO/DX INJ NEW DRUG ADDON: CPT

## 2020-07-24 PROCEDURE — 82550 ASSAY OF CK (CPK): CPT | Performed by: EMERGENCY MEDICINE

## 2020-07-24 PROCEDURE — 81003 URINALYSIS AUTO W/O SCOPE: CPT | Performed by: NURSE PRACTITIONER

## 2020-07-24 PROCEDURE — 96374 THER/PROPH/DIAG INJ IV PUSH: CPT

## 2020-07-24 RX ORDER — TRIAMCINOLONE ACETONIDE 40 MG/ML
40 INJECTION, SUSPENSION INTRA-ARTICULAR; INTRAMUSCULAR ONCE
Status: COMPLETED | OUTPATIENT
Start: 2020-07-24 | End: 2020-07-24

## 2020-07-24 RX ORDER — HYDROCODONE BITARTRATE AND ACETAMINOPHEN 7.5; 325 MG/1; MG/1
1 TABLET ORAL EVERY 6 HOURS PRN
Qty: 10 TABLET | Refills: 0 | Status: SHIPPED | OUTPATIENT
Start: 2020-07-24 | End: 2020-07-28 | Stop reason: SDUPTHER

## 2020-07-24 RX ORDER — IBUPROFEN 600 MG/1
600 TABLET ORAL EVERY 6 HOURS PRN
Qty: 20 TABLET | Refills: 0 | Status: SHIPPED | OUTPATIENT
Start: 2020-07-24 | End: 2020-07-28 | Stop reason: SDUPTHER

## 2020-07-24 RX ORDER — BACLOFEN 10 MG/1
10 TABLET ORAL ONCE
Status: COMPLETED | OUTPATIENT
Start: 2020-07-24 | End: 2020-07-24

## 2020-07-24 RX ORDER — SODIUM CHLORIDE 0.9 % (FLUSH) 0.9 %
10 SYRINGE (ML) INJECTION AS NEEDED
Status: DISCONTINUED | OUTPATIENT
Start: 2020-07-24 | End: 2020-07-24 | Stop reason: HOSPADM

## 2020-07-24 RX ORDER — ONDANSETRON 2 MG/ML
4 INJECTION INTRAMUSCULAR; INTRAVENOUS ONCE
Status: COMPLETED | OUTPATIENT
Start: 2020-07-24 | End: 2020-07-24

## 2020-07-24 RX ORDER — PREDNISONE 20 MG/1
40 TABLET ORAL DAILY
Qty: 10 TABLET | Refills: 0 | Status: SHIPPED | OUTPATIENT
Start: 2020-07-24 | End: 2020-07-29

## 2020-07-24 RX ADMIN — TRIAMCINOLONE ACETONIDE 40 MG: 40 INJECTION, SUSPENSION INTRA-ARTICULAR; INTRAMUSCULAR at 15:51

## 2020-07-24 RX ADMIN — HYDROMORPHONE HYDROCHLORIDE 1 MG: 1 INJECTION, SOLUTION INTRAMUSCULAR; INTRAVENOUS; SUBCUTANEOUS at 15:50

## 2020-07-24 RX ADMIN — BACLOFEN 10 MG: 10 TABLET ORAL at 18:25

## 2020-07-24 RX ADMIN — ONDANSETRON 4 MG: 2 INJECTION INTRAMUSCULAR; INTRAVENOUS at 15:50

## 2020-07-24 NOTE — TELEPHONE ENCOUNTER
PT'S WIFE GERMANIA CALLED STATING PT HAS HURT HIS RIGHT HIP, THE PAIN IS RADIATING TO HIS KNEE, AND HE HAS BEEN NAUSEAS AND VOMITING. HE IS HAVING TROUBLE BEARING WEIGHT AND CANNOT FIND A WAY TO SIT THAT IS COMFORTABLE. HE STATES THE PAIN IS UNBEARABLE AND GETTING WORSE.  THE ENTIRE LEG IS QUIVERING. IT STARTED LATE YESTERDAY AFTERNOON.    OFFICE VISIT SCHEDULED FOR Tuesday, BUT THEY ARE REQUESTING A CALL TO DISCUSS HOW TO MANAGE THE PAIN IN THE MEAN TIME.    PLEASE CALL BACK ASAP.    CALLBACK: [phone]

## 2020-07-24 NOTE — ED PROVIDER NOTES
EMERGENCY DEPARTMENT ENCOUNTER    Room Number:  08/08  Date seen:  7/24/2020  Time seen: 3:31 PM  PCP: Antione Ivan MD  Historian: patient, wife  HPI:  Chief complaint: right hip pain, n/v  A complete HPI/ROS/PMH/PSH/SH/FH are unobtainable due to: n/a  Context:Kendall Her is a 74 y.o. male who presents to the ED with c/o acute right hip pain and pain down the posterior right thigh as well as groin pain that started yesterday evening after rearranging some furniture and being up and down ladder several times.  He has tried multiple doses of aleve with no improvement and cannot find a comfortable position.  The pain is worsened by changing positions or movement.  He denies loss of bowel and bladder or leg weaknesses, no testicular pain and no h/o kidney stones.      Patient was placed in face mask in first look. Patient was wearing facemask when I entered the room and throughout our encounter. I wore full protective equipment throughout this patient encounter including a face mask, eye shield and gloves. Hand hygiene/washing of hands was performed before donning protective equipment and after removal when leaving the room    MEDICAL RECORD REVIEW    ALLERGIES  Peanut-containing drug products    PAST MEDICAL HISTORY  Active Ambulatory Problems     Diagnosis Date Noted   • Degeneration of intervertebral disc of cervical region 06/28/2016   • Neck pain 06/28/2016   • Osteoporosis 06/28/2016   • Osteopenia 08/25/2016   • Vitamin D deficiency 03/02/2017   • Diet-controlled hyperlipidemia 01/08/2019   • Nocturnal leg cramps 01/08/2019   • Hyperparathyroid bone disease (CMS/HCC) 04/14/2019     Resolved Ambulatory Problems     Diagnosis Date Noted   • Calcium blood increased 03/02/2017   • Elevated ALT measurement 03/02/2017     Past Medical History:   Diagnosis Date   • Acid reflux    • Colon cancer screening 01/14/2019   • Parathyroid adenoma    • Shoulder pain, left        PAST SURGICAL HISTORY  Past Surgical  History:   Procedure Laterality Date   • PARATHYROIDECTOMY N/A 4/19/2019    Procedure: right superior parathyroid and left superior parathyroid resection.;  Surgeon: Milagro Gordon MD;  Location: [facility] OR;  Service: General   • TONSILLECTOMY     • TUMOR REMOVAL      AS A CHILD UNDER BREAST       FAMILY HISTORY  Family History   Problem Relation Age of Onset   • Alzheimer's disease Mother    • Alzheimer's disease Father    • Malig Hyperthermia Neg Hx        SOCIAL HISTORY  Social History     Socioeconomic History   • Marital status:      Spouse name: Not on file   • Number of children: Not on file   • Years of education: Not on file   • Highest education level: Not on file   Tobacco Use   • Smoking status: Never Smoker   • Smokeless tobacco: Never Used   Substance and Sexual Activity   • Alcohol use: Yes     Alcohol/week: 3.0 - 5.0 standard drinks     Types: 3 - 5 Cans of beer per week     Comment: weekly   • Drug use: No   • Sexual activity: Defer       REVIEW OF SYSTEMS  Review of Systems    All systems reviewed and negative except for those discussed in HPI.     PHYSICAL EXAM    ED Triage Vitals [07/24/20 1419]   Temp Heart Rate Resp BP SpO2   98.5 °F (36.9 °C) 75 16 -- 98 %      Temp src Heart Rate Source Patient Position BP Location FiO2 (%)   Tympanic Monitor -- -- --     Physical Exam    I have reviewed the triage vital signs and nursing notes.      GENERAL: distressed due to pain and appears uncomfortable  HENT: nares patent  EYES: no scleral icterus  NECK: no ROM limitations  CV: regular rhythm, regular rate, no murmur, no rub and no mary  RESPIRATORY: normal effort, CTAB  ABDOMEN: soft, rounded  : deferred  MUSCULOSKELETAL: no deformity, pelvis stable, no right hip step off or deformity.  Pedal pushes strong and equal, pedal pulses 2+ DP and PT  NEURO: alert, moves all extremities, follows commands  SKIN: warm, dry    LAB RESULTS  Recent Results (from the past 24 hour(s))   Comprehensive  Metabolic Panel    Collection Time: 07/24/20  3:48 PM   Result Value Ref Range    Glucose 141 (H) 65 - 99 mg/dL    BUN 10 8 - 23 mg/dL    Creatinine 0.88 0.76 - 1.27 mg/dL    Sodium 137 136 - 145 mmol/L    Potassium 4.1 3.5 - 5.2 mmol/L    Chloride 103 98 - 107 mmol/L    CO2 27.2 22.0 - 29.0 mmol/L    Calcium 8.9 8.6 - 10.5 mg/dL    Total Protein 6.6 6.0 - 8.5 g/dL    Albumin 4.10 3.50 - 5.20 g/dL    ALT (SGPT) 23 1 - 41 U/L    AST (SGOT) 20 1 - 40 U/L    Alkaline Phosphatase 42 39 - 117 U/L    Total Bilirubin 0.6 0.0 - 1.2 mg/dL    eGFR Non African Amer 85 >60 mL/min/1.73    Globulin 2.5 gm/dL    A/G Ratio 1.6 g/dL    BUN/Creatinine Ratio 11.4 7.0 - 25.0    Anion Gap 6.8 5.0 - 15.0 mmol/L   CBC Auto Differential    Collection Time: 07/24/20  3:48 PM   Result Value Ref Range    WBC 9.95 3.40 - 10.80 10*3/mm3    RBC 5.48 4.14 - 5.80 10*6/mm3    Hemoglobin 16.0 13.0 - 17.7 g/dL    Hematocrit 47.7 37.5 - 51.0 %    MCV 87.0 79.0 - 97.0 fL    MCH 29.2 26.6 - 33.0 pg    MCHC 33.5 31.5 - 35.7 g/dL    RDW 12.8 12.3 - 15.4 %    RDW-SD 40.6 37.0 - 54.0 fl    MPV 10.1 6.0 - 12.0 fL    Platelets 189 140 - 450 10*3/mm3    Neutrophil % 89.0 (H) 42.7 - 76.0 %    Lymphocyte % 7.0 (L) 19.6 - 45.3 %    Monocyte % 2.8 (L) 5.0 - 12.0 %    Eosinophil % 0.3 0.3 - 6.2 %    Basophil % 0.4 0.0 - 1.5 %    Immature Grans % 0.5 0.0 - 0.5 %    Neutrophils, Absolute 8.85 (H) 1.70 - 7.00 10*3/mm3    Lymphocytes, Absolute 0.70 0.70 - 3.10 10*3/mm3    Monocytes, Absolute 0.28 0.10 - 0.90 10*3/mm3    Eosinophils, Absolute 0.03 0.00 - 0.40 10*3/mm3    Basophils, Absolute 0.04 0.00 - 0.20 10*3/mm3    Immature Grans, Absolute 0.05 0.00 - 0.05 10*3/mm3    nRBC 0.0 0.0 - 0.2 /100 WBC   CK    Collection Time: 07/24/20  3:48 PM   Result Value Ref Range    Creatine Kinase 85 20 - 200 U/L   Urinalysis With Microscopic If Indicated (No Culture) - Urine, Clean Catch    Collection Time: 07/24/20  6:37 PM   Result Value Ref Range    Color, UA Yellow Yellow,  Straw    Appearance, UA Clear Clear    pH, UA 7.5 5.0 - 8.0    Specific Gravity, UA 1.022 1.005 - 1.030    Glucose, UA Negative Negative    Ketones, UA Negative Negative    Bilirubin, UA Negative Negative    Blood, UA Negative Negative    Protein, UA Negative Negative    Leuk Esterase, UA Negative Negative    Nitrite, UA Negative Negative    Urobilinogen, UA 1.0 E.U./dL 0.2 - 1.0 E.U./dL         RADIOLOGY RESULTS  XR Hip With or Without Pelvis 2 - 3 View Right   Final Result      XR Spine Lumbar Complete 4+VW   Final Result            PROGRESS, DATA ANALYSIS, CONSULTS AND MEDICAL DECISION MAKING  All labs have been independently reviewed by me.  All radiology studies have been reviewed by me and discussed with radiologist dictating the report.  EKG's independently viewed and interpreted by me unless stated otherwise. Discussion below represents my analysis of pertinent findings related to patient's condition, differential diagnosis, treatment plan and final disposition.     ED Course as of Jul 24 2038 Fri Jul 24, 2020   1608 Pt states pain is beginning to get a bit better.     [EW]   1643 X-ray of the patient's right hip interpreted by myself.  No evidence of fracture.    [TD]   1806 Patient appears much more comfortable, states he still has some pain but it is much improved.     [EW]   1814 Creatine Kinase: 85 [TD]   1915 Pt's urine result is normal.  I     [EW]   1921 I viewed [facility] #[id number], the patient has no active opiate RX    [EW]      ED Course User Index  [EW] Eliza Perez APRN  [TD] Jonny Bundy II, MD     DDX: musculoskeletal hip pain, hip fracture, muscle spasm    MDM: The patient endorse NO: fevers, chills, recent spinal procedures, bowel/bladder incontinence, IV drug use, cancer, recent weight loss, trauma ,weakness numbness, tingling, or dysuria.  His pain was improved and comfort in the bed improved after ED  Pain medications.  Exam not toxic.     1715:Reviewed pt's history and  "workup with Dr. Bundy.  After a bedside evaluation, Dr. Bundy agrees with the plan of care.    The patient's history, physical exam, and lab findings were discussed with the physician, who also performed a face to face history and physical exam.  I discussed all results and noted any abnormalities with patient.  Discussed absoute need to recheck abnormalities with their family physician.  I answered any of the patient's questions.  Discussed plan for discharge, as there is no emergent indication for admission.  Pt is agreeable and understands need for follow up and repeat testing.  Pt is aware that discharge does not mean that nothing is wrong but it indicates no emergency is present and they must continue care with their family physician.  Pt is discharged with instructions to follow up with primary care doctor to have their blood pressure rechecked.       Disposition vitals:  /67   Pulse 74   Temp 98.5 °F (36.9 °C) (Tympanic)   Resp 16   Ht 182.9 cm (72\")   Wt 80.7 kg (178 lb)   SpO2 97%   BMI 24.14 kg/m²       DIAGNOSIS  Final diagnoses:   Acute right hip pain   Acute pain of right thigh       FOLLOW UP   Antione Ivan MD  [id number] [id number] [phone]    Schedule an appointment as soon as possible for a visit   to be seen early next week if pain persists         Eliza Perez APRN  07/24/20 2040    "

## 2020-07-24 NOTE — DISCHARGE INSTRUCTIONS
Do not take Ibuprofen with Aleve, one or the other    YOU HAVE BEEN PRESCRIBED NARCOTIC PAIN MEDICATION    Narcotic pain medications can be addicting; only take them when needed    You have only been given a 2-3 day supply    Do not operate heavy machinery or make important decisions while taking narcotics    Take an over the counters tool softener while taking pain pills to avoid constipation    Do not consume alcohol while taking pain medication as this can be fatal    Do not share your pain medication with others    Store pain medication securely to prevent accidental exposure or death    Return Precautions    Although you are being discharged from the ED today, I encourage you to return for worsening symptoms.  Things can, and do, change such that treatment at home with medication may not be adequate.      Specifically, return for any of the following: loss of bowel or bladder, dragging feet    Chest pain, shortness of breath, pain or nausea and vomiting not controlled by medications provided.    Please make a follow up with your Primary Care Provider for a blood pressure recheck.

## 2020-07-24 NOTE — ED NOTES
Pt reports R hip pain, 8/10 following episode lifting heavy objects yesterday afternoon; denies falling or any trauma to area.  Pt reports walking on affected side after episode.  Pt reports increasing numbness with pain.     Shun Hunt RN  07/24/20 9628

## 2020-07-24 NOTE — ED PROVIDER NOTES
MD ATTESTATION NOTE    The AB and I have discussed this patient's history, physical exam, and treatment plan.  I have reviewed the documentation and personally had a face to face interaction with the patient. I affirm the documentation and agree with the treatment and plan.  The attached note describes my personal findings.      Kendall Her is a 74 y.o. male who presents to the ED c/o right hip pain.  This started last night.  Has been gradually worsening and got to a severe level of discomfort around 3 AM.  It is worse with movement and bearing weight.  Initially felt that he did not do anything to cause this but then his wife reminded him of climbing ladders and lifting boxes at work.  He denies having any abdominal pain, testicular pain.  Pain starts in the right hip and radiates around the thigh to the medial portion.      On exam:  Abdomen soft and nontender, no pulsatile masses  Pain with passive range of motion of the right hip  No overlying redness or warmth to the right hip  2+ right DP pulse  No tenderness to the soft tissue of the patient's right thigh or calf  5/5 strength right hip flexion, knee extension, knee flexion, dorsiflexion and plantar flexion.  Normal sensation to light touch bilaterally  Patient has an antalgic gait but ambulates independently.  He states the pain is worsened whenever he bears weight on his right leg.    Labs  Recent Results (from the past 24 hour(s))   Comprehensive Metabolic Panel    Collection Time: 07/24/20  3:48 PM   Result Value Ref Range    Glucose 141 (H) 65 - 99 mg/dL    BUN 10 8 - 23 mg/dL    Creatinine 0.88 0.76 - 1.27 mg/dL    Sodium 137 136 - 145 mmol/L    Potassium 4.1 3.5 - 5.2 mmol/L    Chloride 103 98 - 107 mmol/L    CO2 27.2 22.0 - 29.0 mmol/L    Calcium 8.9 8.6 - 10.5 mg/dL    Total Protein 6.6 6.0 - 8.5 g/dL    Albumin 4.10 3.50 - 5.20 g/dL    ALT (SGPT) 23 1 - 41 U/L    AST (SGOT) 20 1 - 40 U/L    Alkaline Phosphatase 42 39 - 117 U/L    Total  Bilirubin 0.6 0.0 - 1.2 mg/dL    eGFR Non African Amer 85 >60 mL/min/1.73    Globulin 2.5 gm/dL    A/G Ratio 1.6 g/dL    BUN/Creatinine Ratio 11.4 7.0 - 25.0    Anion Gap 6.8 5.0 - 15.0 mmol/L   CBC Auto Differential    Collection Time: 07/24/20  3:48 PM   Result Value Ref Range    WBC 9.95 3.40 - 10.80 10*3/mm3    RBC 5.48 4.14 - 5.80 10*6/mm3    Hemoglobin 16.0 13.0 - 17.7 g/dL    Hematocrit 47.7 37.5 - 51.0 %    MCV 87.0 79.0 - 97.0 fL    MCH 29.2 26.6 - 33.0 pg    MCHC 33.5 31.5 - 35.7 g/dL    RDW 12.8 12.3 - 15.4 %    RDW-SD 40.6 37.0 - 54.0 fl    MPV 10.1 6.0 - 12.0 fL    Platelets 189 140 - 450 10*3/mm3    Neutrophil % 89.0 (H) 42.7 - 76.0 %    Lymphocyte % 7.0 (L) 19.6 - 45.3 %    Monocyte % 2.8 (L) 5.0 - 12.0 %    Eosinophil % 0.3 0.3 - 6.2 %    Basophil % 0.4 0.0 - 1.5 %    Immature Grans % 0.5 0.0 - 0.5 %    Neutrophils, Absolute 8.85 (H) 1.70 - 7.00 10*3/mm3    Lymphocytes, Absolute 0.70 0.70 - 3.10 10*3/mm3    Monocytes, Absolute 0.28 0.10 - 0.90 10*3/mm3    Eosinophils, Absolute 0.03 0.00 - 0.40 10*3/mm3    Basophils, Absolute 0.04 0.00 - 0.20 10*3/mm3    Immature Grans, Absolute 0.05 0.00 - 0.05 10*3/mm3    nRBC 0.0 0.0 - 0.2 /100 WBC   CK    Collection Time: 07/24/20  3:48 PM   Result Value Ref Range    Creatine Kinase 85 20 - 200 U/L   Urinalysis With Microscopic If Indicated (No Culture) - Urine, Clean Catch    Collection Time: 07/24/20  6:37 PM   Result Value Ref Range    Color, UA Yellow Yellow, Straw    Appearance, UA Clear Clear    pH, UA 7.5 5.0 - 8.0    Specific Gravity, UA 1.022 1.005 - 1.030    Glucose, UA Negative Negative    Ketones, UA Negative Negative    Bilirubin, UA Negative Negative    Blood, UA Negative Negative    Protein, UA Negative Negative    Leuk Esterase, UA Negative Negative    Nitrite, UA Negative Negative    Urobilinogen, UA 1.0 E.U./dL 0.2 - 1.0 E.U./dL       Radiology  Xr Spine Lumbar Complete 4+vw    Result Date: 7/24/2020  FIVE-VIEW LUMBAR SPINE  HISTORY: Lifting  injury. Low back pain extending into right hip.  FINDINGS: There is some minimal disc space narrowing and spurring at L3-4 and L4-5 as well as some mild facet spurring. There is no evidence of acute compression fracture.  TWO-VIEW RIGHT HIP AND ONE VIEW AP PELVIS  HISTORY: Right hip pain.  FINDINGS: There are minimal degenerative changes involving both hips symmetrically with some slight joint space narrowing and minimal marginal spurring of each acetabulum. There are also some minimal degenerative changes at both sacroiliac joints.  This report was finalized on 7/24/2020 5:13 PM by Dr. Arnold Hartley M.D.      Xr Hip With Or Without Pelvis 2 - 3 View Right    Result Date: 7/24/2020  FIVE-VIEW LUMBAR SPINE  HISTORY: Lifting injury. Low back pain extending into right hip.  FINDINGS: There is some minimal disc space narrowing and spurring at L3-4 and L4-5 as well as some mild facet spurring. There is no evidence of acute compression fracture.  TWO-VIEW RIGHT HIP AND ONE VIEW AP PELVIS  HISTORY: Right hip pain.  FINDINGS: There are minimal degenerative changes involving both hips symmetrically with some slight joint space narrowing and minimal marginal spurring of each acetabulum. There are also some minimal degenerative changes at both sacroiliac joints.  This report was finalized on 7/24/2020 5:13 PM by Dr. Arnold Hartley M.D.        Medical Decision Making:  ED Course as of Jul 24 2255   Fri Jul 24, 2020   1608 Pt states pain is beginning to get a bit better.     [EW]   1643 X-ray of the patient's right hip interpreted by myself.  No evidence of fracture.    [TD]   1806 Patient appears much more comfortable, states he still has some pain but it is much improved.     [EW]   1814 Creatine Kinase: 85 [TD]   1915 Pt's urine result is normal.  I     [EW]   1921 I viewed [facility] #[id number], the patient has no active opiate RX    [EW]      ED Course User Index  [EW] Eliza Perez APRN  [TD] Jonny Bundy II, MD        Differential diagnosis includes rhabdomyolysis, septic joint, tenosynovitis, DVT, meralgia paresthetica.  He has no Homans sign.  No chest pain or shortness of breath.  No leg edema or tenderness admit me concerned about a DVT.  He denies having any back pain whatsoever as well as no change in his bowel or bladder.  He has good strength on exam although there is some weakness due to pain that he is able to fight through when I encouraged him.  He is able to ambulate independently.  This seems to be very musculoskeletal in nature.  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  Additionally, it is worsened with movement and bearing weight.    PPE: Both the patient and I wore a surgical mask throughout the entire patient encounter. I wore protective goggles.     Diagnosis  Final diagnoses:   Acute right hip pain   Acute pain of right thigh        Jonny Bundy II, MD  07/24/20 8790

## 2020-07-26 ENCOUNTER — EPISODE CHANGES (OUTPATIENT)
Dept: CASE MANAGEMENT | Facility: OTHER | Age: 75
End: 2020-07-26

## 2020-07-27 ENCOUNTER — EPISODE CHANGES (OUTPATIENT)
Dept: CASE MANAGEMENT | Facility: OTHER | Age: 75
End: 2020-07-27

## 2020-07-27 ENCOUNTER — PATIENT OUTREACH (OUTPATIENT)
Dept: CASE MANAGEMENT | Facility: OTHER | Age: 75
End: 2020-07-27

## 2020-07-27 NOTE — OUTREACH NOTE
Care Plan Note      Responses   Annual Wellness Visit:   Patient Has Completed   Care Gaps Addressed  Pneumonia Vaccine, Colon Cancer Screening   Colon Cancer Screening Type  Cologuard   Cologuard Status  Up to Date (< 3 yrs)   Colon Cancer Screening Completion at [facility] or Other  [facility]   Pneumonia Vaccine Status  Up to Date   Other Patient Education/Resources   24/7 [facility] Nurse Call Line, Advanced Care Planning, [facility]   24/7 Nurse Call Line Education Method  Verbal   ACP Education Method  Verbal [Completed]   [facility] Education Method  Verbal [Active]   Advanced Directives:  Patient Has   Medication Adherence  Medications understood        The main concerns and/or symptoms the patient would like to address are: Talked with patient. Discussed 7/24/20  ED visit regarding  Right hip and thigh pain. Patient states to be compliant with ED recommendations; taking medications as directed and states symptoms have improved. He states pain to right hip and thigh has improvement and now has pain to right knee. He reports able to bend at knee; not bearing weight and no swelling noted. He is ambulating with crutches. Confirms PCP appointment for 7/28/20. He reports no difficulty with fever;  chest pain; SOB; appetite or sleeping. Patient lives with spouse; independent with ADL's; meal preparation; receiving assistance with transportation. Patient compliant with medications and  medical appointments.     Education/instruction provided by Care Coordinator: Reviewed with patient ED recommendations; COVID 19 precautions; 24/7 Nurse Line Telephone number; AC contact information; Advance Directives; My Chart; gaps in care; [facility] and [place] Medicare Replacement services.  Patient verbalized understanding and states to appreciate phone call.  No further questions or concerns voiced at this time.     Follow Up Outreach Due: Follow up as needed.     Rupali Mcnally RN  Ambulatory     7/27/2020, 16:02

## 2020-07-28 ENCOUNTER — OFFICE VISIT (OUTPATIENT)
Dept: FAMILY MEDICINE CLINIC | Facility: CLINIC | Age: 75
End: 2020-07-28

## 2020-07-28 VITALS
RESPIRATION RATE: 16 BRPM | OXYGEN SATURATION: 97 % | HEART RATE: 75 BPM | BODY MASS INDEX: 24.65 KG/M2 | WEIGHT: 182 LBS | TEMPERATURE: 98.9 F | HEIGHT: 72 IN | SYSTOLIC BLOOD PRESSURE: 110 MMHG | DIASTOLIC BLOOD PRESSURE: 78 MMHG

## 2020-07-28 DIAGNOSIS — M25.561 ACUTE PAIN OF RIGHT KNEE: Primary | ICD-10-CM

## 2020-07-28 DIAGNOSIS — M79.651 ACUTE PAIN OF RIGHT THIGH: ICD-10-CM

## 2020-07-28 DIAGNOSIS — M25.551 ACUTE RIGHT HIP PAIN: ICD-10-CM

## 2020-07-28 PROCEDURE — 99213 OFFICE O/P EST LOW 20 MIN: CPT | Performed by: INTERNAL MEDICINE

## 2020-07-28 RX ORDER — HYDROCODONE BITARTRATE AND ACETAMINOPHEN 7.5; 325 MG/1; MG/1
1 TABLET ORAL EVERY 6 HOURS PRN
Qty: 30 TABLET | Refills: 0 | Status: SHIPPED | OUTPATIENT
Start: 2020-07-28 | End: 2020-08-04 | Stop reason: SDUPTHER

## 2020-07-28 RX ORDER — IBUPROFEN 600 MG/1
600 TABLET ORAL EVERY 6 HOURS PRN
Qty: 30 TABLET | Refills: 1 | Status: SHIPPED | OUTPATIENT
Start: 2020-07-28 | End: 2020-08-04 | Stop reason: SDUPTHER

## 2020-07-28 NOTE — PROGRESS NOTES
Subjective  Answers for HPI/ROS submitted by the patient on 7/27/2020   Lower extremity pain  What is the primary reason for your visit?: Lower Extremity Injury  Incident occurred: 5 to 7 days ago  Incident location: at work  Injury mechanism: unknown  Pain location: right knee  Pain quality: aching, stabbing  Pain - numeric: 9/10  Pain course: improving  tingling: Yes  inability to bear weight: Yes  loss of motion: Yes  loss of sensation: Yes  muscle weakness: Yes  Foreign body present: no foreign bodies    Kendall Her is a 74 y.o. male. Patient is here today for follow-up on recent emergency room visit.  Patient was doing some climbing on a ladder and had no known injury.  The following day he developed significant pain that seem to be in his right thigh and hip area with some numbness in the right knee.  He was seen at the emergency room and had x-rays done which showed some mild degenerative changes but nothing dramatic.  He was sent home on prednisone ibuprofen and pain medications.  Since then the patient reports that his back is fine his hip and thigh are now fine but he is having pain concentrated in his right knee.  It is aggravated by standing or trying to walk.  It is helped by the ibuprofen.  Chief Complaint   Patient presents with   • Hip Pain     PT HERE FOR ER FOLLOW UP FOR HIP/THIGH/KNEE PAIN          Vitals:    07/28/20 0906   BP: 110/78   Pulse: 75   Resp: 16   Temp: 98.9 °F (37.2 °C)   SpO2: 97%     Body mass index is 24.68 kg/m².  The following portions of the patient's history were reviewed and updated as appropriate: allergies, current medications, past family history, past medical history, past social history, past surgical history and problem list.    Past Medical History:   Diagnosis Date   • Acid reflux    • Colon cancer screening 01/14/2019    Cologuard testing: Negative results   • Neck pain    • Osteopenia     lumbar spine   • Osteoporosis     left femoral neck   • Parathyroid adenoma     • Shoulder pain, left       Allergies   Allergen Reactions   • Peanut-Containing Drug Products Swelling     Patient states after eating some (more than a few) his throat seems to contract      Social History     Socioeconomic History   • Marital status:      Spouse name: Not on file   • Number of children: Not on file   • Years of education: Not on file   • Highest education level: Not on file   Tobacco Use   • Smoking status: Never Smoker   • Smokeless tobacco: Never Used   Substance and Sexual Activity   • Alcohol use: Yes     Alcohol/week: 3.0 - 5.0 standard drinks     Types: 3 - 5 Cans of beer per week     Comment: weekly   • Drug use: No   • Sexual activity: Defer        Current Outpatient Medications:   •  HYDROcodone-acetaminophen (NORCO) 7.5-325 MG per tablet, Take 1 tablet by mouth Every 6 (Six) Hours As Needed for Moderate Pain  or Severe Pain ., Disp: 30 tablet, Rfl: 0  •  ibuprofen (ADVIL,MOTRIN) 600 MG tablet, Take 1 tablet by mouth Every 6 (Six) Hours As Needed for Mild Pain ., Disp: 30 tablet, Rfl: 1  •  Misc Natural Products (RESVERATROL DIET) capsule, , Disp: , Rfl:   •  Omega-3 1000 MG capsule, , Disp: , Rfl:   •  predniSONE (DELTASONE) 20 MG tablet, Take 2 tablets by mouth Daily for 5 days., Disp: 10 tablet, Rfl: 0  •  Turmeric 500 MG capsule, , Disp: , Rfl:   •  Unable to find, 1 each 1 (One) Time. Med Name: Relief Factor., Disp: , Rfl:      Objective     Hip Pain      Lower Extremity Issue   Associated symptoms include arthralgias. The symptoms are aggravated by movement and weight bearing.        Review of Systems   Constitutional: Negative.    HENT: Negative.    Respiratory: Negative.    Cardiovascular: Negative.    Gastrointestinal: Negative.    Genitourinary: Negative.    Musculoskeletal: Positive for arthralgias.        Right knee pain   Skin: Negative.    Neurological: Negative.    Psychiatric/Behavioral: Negative.        Physical Exam   Constitutional: He is oriented to person,  place, and time. He appears well-developed and well-nourished.   Pleasant, cooperative but having to ambulate using crutches and moving slowly because of right knee pain   Neck: Normal range of motion. Neck supple.   Cardiovascular: Normal rate, regular rhythm and normal heart sounds.   Pulmonary/Chest: Effort normal and breath sounds normal. No respiratory distress. He has no wheezes. He has no rales.   Musculoskeletal: He exhibits no edema.   There is no pain with moving the right leg in the hip joint and no right thigh tenderness.  Patient has near full range of motion at the right knee with some discomfort and no tenderness to palpation   Neurological: He is alert and oriented to person, place, and time.   Skin: Skin is warm and dry.   Psychiatric: He has a normal mood and affect. His behavior is normal.   Nursing note and vitals reviewed.      ASSESSMENT #1-significant right knee pain     Problem List Items Addressed This Visit     None      Visit Diagnoses     Acute pain of right knee    -  Primary    Relevant Orders    Ambulatory Referral to Orthopedic Surgery    Acute right hip pain        Relevant Medications    HYDROcodone-acetaminophen (NORCO) 7.5-325 MG per tablet    Acute pain of right thigh        Relevant Medications    HYDROcodone-acetaminophen (NORCO) 7.5-325 MG per tablet          PLAN I refilled the patient's pain medication and ibuprofen.  He will stop the prednisone.  I am also referring him to orthopedics, Dr. Ginny Rodriguez for further evaluation and treatment.  He will continue nonweightbearing    There are no Patient Instructions on file for this visit.  No follow-ups on file.

## 2020-07-30 ENCOUNTER — OFFICE VISIT (OUTPATIENT)
Dept: ORTHOPEDIC SURGERY | Facility: CLINIC | Age: 75
End: 2020-07-30

## 2020-07-30 VITALS — HEIGHT: 72 IN | TEMPERATURE: 98.6 F | BODY MASS INDEX: 25.11 KG/M2 | WEIGHT: 185.4 LBS

## 2020-07-30 DIAGNOSIS — M25.561 RIGHT KNEE PAIN, UNSPECIFIED CHRONICITY: Primary | ICD-10-CM

## 2020-07-30 DIAGNOSIS — M54.10 ACUTE LOW BACK PAIN WITH RADICULAR SYMPTOMS, DURATION LESS THAN 6 WEEKS: ICD-10-CM

## 2020-07-30 PROCEDURE — 73562 X-RAY EXAM OF KNEE 3: CPT | Performed by: ORTHOPAEDIC SURGERY

## 2020-07-30 PROCEDURE — 99204 OFFICE O/P NEW MOD 45 MIN: CPT | Performed by: ORTHOPAEDIC SURGERY

## 2020-07-30 RX ORDER — METHYLPREDNISOLONE 4 MG/1
TABLET ORAL
Qty: 21 TABLET | Refills: 0 | Status: SHIPPED | OUTPATIENT
Start: 2020-07-30 | End: 2020-08-25

## 2020-07-30 NOTE — PROGRESS NOTES
New right Knee      Patient: Kendall Her        YOB: 1945    Medical Record Number: [id number]        Chief Complaints: right knee pain      History of Present Illness: This is a 74-year-old male who presents complaining of right knee/leg pain that began a week ago.  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  That night started noticing pain in the thigh groin buttock and then eventually down into the knee.  He hurts so bad he was seen in ER they x-rayed his hip and his back did put him on a lot of steroids without tapering those but only for a few days.  He states the steroids have helped some but it is still quite limited he states the biggest part of his pain right now is from his knee there is no swelling no prior history of similar symptoms current symptoms are severe intermittent aching worse with standing better with rest he is retired past medical history is remarkable for osteoporosis acid reflux parathyroid adenoma        Allergies:   Allergies   Allergen Reactions   • Peanut-Containing Drug Products Swelling     Patient states after eating some (more than a few) his throat seems to contract       Medications:   Home Medications:  Current Outpatient Medications on File Prior to Visit   Medication Sig   • HYDROcodone-acetaminophen (NORCO) 7.5-325 MG per tablet Take 1 tablet by mouth Every 6 (Six) Hours As Needed for Moderate Pain  or Severe Pain .   • ibuprofen (ADVIL,MOTRIN) 600 MG tablet Take 1 tablet by mouth Every 6 (Six) Hours As Needed for Mild Pain .   • Misc Natural Products (RESVERATROL DIET) capsule    • Omega-3 1000 MG capsule    • [] predniSONE (DELTASONE) 20 MG tablet Take 2 tablets by mouth Daily for 5 days.   • Turmeric 500 MG capsule    • Unable to find 1 each 1 (One) Time. Med Name: Relief Factor.     No current  "facility-administered medications on file prior to visit.      Current Medications:  Scheduled Meds:  Continuous Infusions:  No current facility-administered medications for this visit.   PRN Meds:.    Past Medical History:   Diagnosis Date   • Acid reflux    • Colon cancer screening 01/14/2019    Cologuard testing: Negative results   • Neck pain    • Osteopenia     lumbar spine   • Osteoporosis     left femoral neck   • Parathyroid adenoma    • Shoulder pain, left         Past Surgical History:   Procedure Laterality Date   • PARATHYROIDECTOMY N/A 4/19/2019    Procedure: right superior parathyroid and left superior parathyroid resection.;  Surgeon: Milagro Gordon MD;  Location: [facility];  Service: General   • TONSILLECTOMY     • TUMOR REMOVAL      AS A CHILD UNDER BREAST        Social History     Occupational History   • Not on file   Tobacco Use   • Smoking status: Never Smoker   • Smokeless tobacco: Never Used   Substance and Sexual Activity   • Alcohol use: Yes     Alcohol/week: 3.0 - 5.0 standard drinks     Types: 3 - 5 Cans of beer per week     Comment: weekly   • Drug use: No   • Sexual activity: Defer      Social History     Social History Narrative   • Not on file        Family History   Problem Relation Age of Onset   • Alzheimer's disease Mother    • Alzheimer's disease Father    • Malig Hyperthermia Neg Hx              Review of Systems: 14 point review of systems are remarkable for the pertinent positives listed in the chart by the patient the remainder negative    Review of Systems      Physical Exam: 74 y.o. male  General Appearance:    Alert, cooperative, in no acute distress                   Vitals:    07/30/20 1111   Temp: 98.6 °F (37 °C)   Weight: 84.1 kg (185 lb 6.4 oz)   Height: 182.9 cm (72\")      Patient is alert and read ×3 no acute distress appears her above-listed at height weight and age.  Affect is normal respiratory rate is normal unlabored. Heart rate regular rate rhythm, " sclera, dentition and hearing are normal for the purpose of this exam.  He is walking with crutches very hesitantly with a right lower extremity        Ortho Exam  Physical exam of his right knee he has no effusion no redness he has no medial joint line tenderness or lateral joint line tenderness but he does have hypersensitivity over the skin anteriorly.  He also has the same sensitivity in the area of the thigh he reports it also feels like it is asleep.  He has a mildly positive straight leg raise negative Lasegue's he appears neurologically intact with guarded manual muscle test which is 5/5 sensation is intact he has good distal pulses no overlying skin changes  Procedures             Radiology:   AP, Lateral and merchant views of the right knee  were ordered/reviewed to evauateknee pain.  I have no comparative films these show some very mild patellofemoral OA otherwise no acute bony pathology he does have x-rays of his hip from [facility] 2 views which I did review these show some mild narrowing of both hip joints pretty symmetric bilaterally he also has AP and lateral of his lumbar spine which and obliques which I have also reviewed which show some degenerative changes throughout his lumbar spine no obvious listhesis  Imaging Results (Most Recent)     Procedure Component Value Units Date/Time    XR Knee 3 View Right [[id number]] Resulted:  07/30/20 0831     Updated:  07/30/20 1105    Impression:       Ordering physician's impression is located in the Encounter Note dated 07/30/20. X-ray performed in the DR room.          Assessment/Plan:      Right lower extremity pain with emphasis on knee pain.  He is hurting    I really think this is more nerve think this is radicular plan is to put him on a Medrol Dosepak which is a tapered steroid dose I am going to MRI of his lumbar spine and we will proceed from there.  If this fails to disclose the source of his symptoms would consider MRI of his knee however I do believe  this is going to end up being nerve in origin

## 2020-08-04 ENCOUNTER — HOSPITAL ENCOUNTER (OUTPATIENT)
Dept: MRI IMAGING | Facility: HOSPITAL | Age: 75
Discharge: HOME | End: 2020-08-04

## 2020-08-04 DIAGNOSIS — M79.651 ACUTE PAIN OF RIGHT THIGH: ICD-10-CM

## 2020-08-04 DIAGNOSIS — M54.10 ACUTE LOW BACK PAIN WITH RADICULAR SYMPTOMS, DURATION LESS THAN 6 WEEKS: ICD-10-CM

## 2020-08-04 DIAGNOSIS — M25.551 ACUTE RIGHT HIP PAIN: ICD-10-CM

## 2020-08-04 NOTE — TELEPHONE ENCOUNTER
Patient is calling requesting a refill of Hydrocodone 7.5/325, 1 every 6 hrs, #30. Ibuprofen 600mg , 1 every 6 hrs, #30 Please advise.cld

## 2020-08-05 ENCOUNTER — TELEPHONE (OUTPATIENT)
Dept: ORTHOPEDIC SURGERY | Facility: CLINIC | Age: 75
End: 2020-08-05

## 2020-08-05 RX ORDER — IBUPROFEN 600 MG/1
600 TABLET ORAL EVERY 6 HOURS PRN
Qty: 30 TABLET | Refills: 1 | Status: SHIPPED | OUTPATIENT
Start: 2020-08-05 | End: 2020-08-25

## 2020-08-05 RX ORDER — HYDROCODONE BITARTRATE AND ACETAMINOPHEN 7.5; 325 MG/1; MG/1
1 TABLET ORAL EVERY 6 HOURS PRN
Qty: 30 TABLET | Refills: 0 | Status: SHIPPED | OUTPATIENT
Start: 2020-08-05 | End: 2020-08-25

## 2020-08-05 NOTE — TELEPHONE ENCOUNTER
"In addition to checking on status of refill request from yesterday, patient called follow up on [place] message sent last night Tues 8/04/20:      \"Dr. Rodriguez,   The MRI that I waited long for could not happen tonite.  I will call you in the morning with more info on that problem.  I could not tolerate the chills or positioning to enter the machine.  We need to talk about the larger problem - which is how do I select a physician to partner with me for a successful resolution of my problems.     I connected with you primarily on the possibility of knee injury.  I am in great pain every day, leg is swollen, and what I experienced so far is just waiting.  We don't know why these issues are occurring.  Consider how much we have talked about this since my original contact to you.  Please help me make the wisest medical choices toward results that we would both want for me.  cell [phone].\"     "

## 2020-08-05 NOTE — TELEPHONE ENCOUNTER
----- Message from Kendall Her sent at 8/4/2020 11:55 PM EDT -----  Regarding: Complaint  Contact: [phone]  Dr. Rodriguez,  The MRI that I waited long for could not happen tonite.  I will call you in the morning with more info on that problem.  I could not tolerate the chills or positioning to enter the machine.  We need to talk about the larger problem - which is how do I select a physician to partner with me for a successful resolution of my problems.    I connected with you primarily on the possibility of knee injury.  I am in great pain every day, leg is swollen, and what I experienced so far is just waiting.  We don't know why these issues are occurring.  Consider how much we have talked about this since my original contact to you.  Please help me make the wisest medical choices toward results that we would both want for me.  cell [phone].

## 2020-08-05 NOTE — TELEPHONE ENCOUNTER
Dr. Rodriguez already approved this but did not send it.  If you can review and are okay with it, can you please approve it?

## 2020-08-05 NOTE — TELEPHONE ENCOUNTER
I really think this is a nerve issue.  I want him to see yvettew  Please see if you can make that happen quick   I think I sent in meds yesterday  Can you confirm

## 2020-08-05 NOTE — TELEPHONE ENCOUNTER
Patient called checking on status of refill request from yesterday Tues 8/04. Patient stated he is going to run out of medication tomorrow Thurs 8/06. Patient can be reached at [phone]. Thanks /srh

## 2020-08-12 ENCOUNTER — TELEPHONE (OUTPATIENT)
Dept: ORTHOPEDIC SURGERY | Facility: CLINIC | Age: 75
End: 2020-08-12

## 2020-08-12 NOTE — TELEPHONE ENCOUNTER
----- Message from Kendlal Her sent at 8/11/2020  5:25 PM EDT -----  Regarding: RE: Complaint  Contact: [phone]  Dr. Rodriguez,  I agree that my leg  problem appears to be a nerve issue.  Although I had planned to migrate to the offices of Dr. Jose Martin Gross, whom I had experience with some years ago on a spine related issue, it appears to me now that I cannot do so.  If you want me to see Dr. Galarza first, I will.  It is evident now that I cannot obtain an MRI other than by your action, and my 24 hour pain is wearing me down.  Please have mercy on me.  I've called your office to request action for this MRI scheduling and have not succeeded.  Rebeca is out, Dilia says call Miranda, and Miranda (surely has to be busy) has not called me back.  Whatever I've done wrong is a mystery to me, but PLEASE know that I NEED the MRI for diagnosis by someone.  [phone]  ----- Message -----  From: LUL RENNER  Sent: 8/5/2020  2:16 PM EDT  To: Kendall Her  Subject: RE: Complaint  Mr. Her,    Dr. Rodriguez wants you to see Dr. Galarza as she really thinks this is a nerve issue.  I am working on that and will call you to schedule appt.      Thank you.    ----- Message -----     From: Kendall Her     Sent: 8/4/2020 11:55 PM EDT       To: Marilynn Rodriguez MD  Subject: Complaint    Dr. Rodriguez,  The MRI that I waited long for could not happen tonite.  I will call you in the morning with more info on that problem.  I could not tolerate the chills or positioning to enter the machine.  We need to talk about the larger problem - which is how do I select a physician to partner with me for a successful resolution of my problems.    I connected with you primarily on the possibility of knee injury.  I am in great pain every day, leg is swollen, and what I experienced so far is just waiting.  We don't know why these issues are occurring.  Consider how much we have talked about this since my original contact to you.  Please help me make  the wisest medical choices toward results that we would both want for me.  cell [phone].

## 2020-08-12 NOTE — TELEPHONE ENCOUNTER
Trust me you have not done anything wrong it is just we have to get insurance approval first and then get this scheduled I will try to investigate today make sure we get this MRI very very soon and have you see Dr. Greg Jean and Liz lets make this happen please and get him into see Dr. Greg MATTHEW there is something going on here.   Also can you all please let me know when these are scheduled for

## 2020-08-12 NOTE — TELEPHONE ENCOUNTER
Got it thanks   I could send him in some valium if he wants .  He would need a .  I also saw where he was scheduled w isagw but cx  Can we make sure this gets rescheduled soon?  Thx!!!!

## 2020-08-13 ENCOUNTER — TELEPHONE (OUTPATIENT)
Dept: ORTHOPEDIC SURGERY | Facility: CLINIC | Age: 75
End: 2020-08-13

## 2020-08-13 ENCOUNTER — TELEPHONE (OUTPATIENT)
Dept: FAMILY MEDICINE CLINIC | Facility: CLINIC | Age: 75
End: 2020-08-13

## 2020-08-13 ENCOUNTER — APPOINTMENT (OUTPATIENT)
Dept: MRI IMAGING | Facility: HOSPITAL | Age: 75
End: 2020-08-13

## 2020-08-13 NOTE — TELEPHONE ENCOUNTER
CALLED PT AND SPOKE WITH HIM ABOUT THE MRI.   HE IS GOING TO [facility] TO GET A COPY OF THE DISC FOR THE MRI AND THEN WILL DROP OFF HERE FOR US TO SCAN FOR [facility] REVIEW.

## 2020-08-13 NOTE — TELEPHONE ENCOUNTER
----- Message from Kendall Her sent at 8/12/2020  8:11 PM EDT -----  Regarding: Test Results Question  Contact: [phone]  Dr. Galarza,   Today I had an appointment at [facility] for Ultrasound test to see if I have bloodclots. This could b cause 4 my leg pain. The exam confirmed I do, and I have blood thinner prescription.  Also, their ER  wanted me to have an MRI.  This was done also, and it confirmed one vertabrae out of position, with a pinched nerve result.  I don't think you will want to do another MRI ( I have one scheduled at 4PM Thursday 8/13).  I will call your office tomorrow morning about this.   Dheeraj Her [phone]

## 2020-08-13 NOTE — TELEPHONE ENCOUNTER
You did not answer my question: Where is the MRI and wears the report?  I will review these and decide if I need to move his appointment up or if he even needs to see me.  Apparently he had a blood clot.

## 2020-08-13 NOTE — TELEPHONE ENCOUNTER
----- Message from Kendall Her sent at 8/12/2020  8:11 PM EDT -----  Regarding: Test Results Question  Contact: [phone]  MY CHART MESSAGE:    Dr. Galarza,   Today I had an appointment at [facility] for Ultrasound test to see if I have bloodclots. This could b cause 4 my leg pain. The exam confirmed I do, and I have blood thinner prescription.  Also, their ER  wanted me to have an MRI.  This was done also, and it confirmed one vertabrae out of position, with a pinched nerve result.  I don't think you will want to do another MRI ( I have one scheduled at 4PM Thursday 8/13).  I will call your office tomorrow morning about this.   Dheeraj Her [phone]

## 2020-08-13 NOTE — TELEPHONE ENCOUNTER
PATIENT CALLED TO REQUEST A HOSP F/U APPT WITH DR ROCHA. WAS SEEN AT [facility] D/T BLOOD CLOTS AND LUMBAR SPINE ISSUES. WAS PUT ON BLOOD THINNERS.   PLEASE CALL BACK    [phone]

## 2020-08-13 NOTE — TELEPHONE ENCOUNTER
----- Message from Kendall Her sent at 8/12/2020  8:25 PM EDT -----  Regarding: Test Results Question  Contact: [phone]  Dr. Galarza,  Just now, I tried to send you a message, about my test/exam at [facility] today.  I could not confirm it was sent. I will call your office in the am.  Thx.

## 2020-08-25 ENCOUNTER — OFFICE VISIT (OUTPATIENT)
Dept: FAMILY MEDICINE CLINIC | Facility: CLINIC | Age: 75
End: 2020-08-25

## 2020-08-25 VITALS
RESPIRATION RATE: 16 BRPM | WEIGHT: 173 LBS | SYSTOLIC BLOOD PRESSURE: 110 MMHG | OXYGEN SATURATION: 97 % | BODY MASS INDEX: 23.43 KG/M2 | HEART RATE: 104 BPM | TEMPERATURE: 99.1 F | DIASTOLIC BLOOD PRESSURE: 76 MMHG | HEIGHT: 72 IN

## 2020-08-25 DIAGNOSIS — I82.461 ACUTE DEEP VEIN THROMBOSIS (DVT) OF CALF MUSCLE VEIN OF RIGHT LOWER EXTREMITY (HCC): Primary | ICD-10-CM

## 2020-08-25 DIAGNOSIS — M51.16 LUMBAR DISC DISEASE WITH RADICULOPATHY: ICD-10-CM

## 2020-08-25 PROCEDURE — 99214 OFFICE O/P EST MOD 30 MIN: CPT | Performed by: INTERNAL MEDICINE

## 2020-08-25 RX ORDER — IBUPROFEN 600 MG/1
TABLET ORAL
COMMUNITY
Start: 2020-08-06 | End: 2020-08-25

## 2020-08-25 RX ORDER — TIZANIDINE 4 MG/1
4 TABLET ORAL
COMMUNITY
Start: 2020-08-17 | End: 2020-09-16

## 2020-08-25 RX ORDER — GABAPENTIN 300 MG/1
300 CAPSULE ORAL 3 TIMES DAILY
COMMUNITY
Start: 2020-08-14 | End: 2021-01-21

## 2020-08-25 NOTE — PROGRESS NOTES
Subjective   Knedall Her is a 74 y.o. male. Patient is here today for follow-up from recent emergency room visit at [facility].  Patient has been having low back pain and right leg pain.  He was at the emergency room and was found to have also an acute DVT in the right calf.  He was started on Eliquis and is here for follow-up.  Overall he is better.  He continues with low back pain with intermittent right leg pains.  He has some improved swelling in his right calf but no tenderness there.  Chief Complaint   Patient presents with   • DVT     RIGHT LEG AND PAIN IN BACK           Vitals:    08/25/20 1354   BP: 110/76   Pulse: 104   Resp: 16   Temp: 99.1 °F (37.3 °C)   SpO2: 97%     Body mass index is 23.46 kg/m².  The following portions of the patient's history were reviewed and updated as appropriate: allergies, current medications, past family history, past medical history, past social history, past surgical history and problem list.    Past Medical History:   Diagnosis Date   • Acid reflux    • Colon cancer screening 01/14/2019    Cologuard testing: Negative results   • Neck pain    • Osteopenia     lumbar spine   • Osteoporosis     left femoral neck   • Parathyroid adenoma    • Shoulder pain, left       Allergies   Allergen Reactions   • Peanut-Containing Drug Products Swelling     Patient states after eating some (more than a few) his throat seems to contract      Social History     Socioeconomic History   • Marital status:      Spouse name: Not on file   • Number of children: Not on file   • Years of education: Not on file   • Highest education level: Not on file   Tobacco Use   • Smoking status: Never Smoker   • Smokeless tobacco: Never Used   Substance and Sexual Activity   • Alcohol use: Yes     Alcohol/week: 3.0 - 5.0 standard drinks     Types: 3 - 5 Cans of beer per week     Comment: weekly   • Drug use: No   • Sexual activity: Defer        Current Outpatient Medications:   •  gabapentin (NEURONTIN)  300 MG capsule, Take 300 mg by mouth 3 (Three) Times a Day., Disp: , Rfl:   •  Misc Natural Products (RESVERATROL DIET) capsule, , Disp: , Rfl:   •  Omega-3 1000 MG capsule, , Disp: , Rfl:   •  tiZANidine (ZANAFLEX) 4 MG tablet, Take 4 mg by mouth., Disp: , Rfl:   •  Turmeric 500 MG capsule, , Disp: , Rfl:   •  Unable to find, 1 each 1 (One) Time. Med Name: Relief Factor., Disp: , Rfl:   •  apixaban (ELIQUIS) 5 MG tablet tablet, Take 1 tablet by mouth Every 12 (Twelve) Hours., Disp: 60 tablet, Rfl: 4     Objective     History of Present Illness     Review of Systems   Constitutional: Negative.    HENT: Negative.    Respiratory: Negative.    Cardiovascular: Negative.    Gastrointestinal: Negative.    Genitourinary: Negative.    Musculoskeletal: Positive for back pain.        Right lower leg swelling   Skin: Negative.    Neurological: Negative.    Psychiatric/Behavioral: Negative.        Physical Exam   Constitutional: He appears well-developed and well-nourished.   Pleasant, cooperative no acute distress but some pain with movement   HENT:   Head: Normocephalic and atraumatic.   Eyes: Conjunctivae are normal. No scleral icterus.   Neck: Normal range of motion. Neck supple.   Cardiovascular: Normal rate, regular rhythm and normal heart sounds.   Pulmonary/Chest: Effort normal and breath sounds normal. No respiratory distress. He has no wheezes. He has no rales.   Musculoskeletal: Normal range of motion. He exhibits edema.   There is 1-2+ edema in the right lower leg but no calf tenderness palpable cords and Homans is negative   Neurological: He is alert.   Skin: Skin is warm and dry.   Psychiatric: He has a normal mood and affect. His behavior is normal.   Nursing note and vitals reviewed.      ASSESSMENT recent MRI scan at [facility] shows a right sided disc herniation at L3-4 and also showed an acute DVT in the right calf.  Patient has been on Eliquis without any problems and overall seems to be improving.  He does  have follow-up with neurosurgery already scheduled  #1-right sided L3-4 disc protrusion with nerve impingement  #2-DVT in the right calf, improving on Eliquis     Problem List Items Addressed This Visit        Cardiovascular and Mediastinum    Acute deep vein thrombosis (DVT) of calf muscle vein of right lower extremity (CMS/HCC) - Primary       Nervous and Auditory    Lumbar disc disease with radiculopathy          PLAN I discussed with the patient and his wife that we want to continue the Eliquis for minimum 3 months and certainly can treat to 6 months.  I would like to recheck the patient in 4 months and plan on getting a repeat DV then    There are no Patient Instructions on file for this visit.  Return in about 4 months (around 12/25/2020).

## 2020-08-31 ENCOUNTER — TELEPHONE (OUTPATIENT)
Dept: FAMILY MEDICINE CLINIC | Facility: CLINIC | Age: 75
End: 2020-08-31

## 2020-08-31 NOTE — TELEPHONE ENCOUNTER
Patient calling and has red splotches on legs. He is on blood thiners and DR. MISSY Wood(spine dr) is concerned about this and advised he call PCP because he should be monitored every two weeks. Would like a call back to advise at [phone]

## 2020-09-01 ENCOUNTER — TELEPHONE (OUTPATIENT)
Dept: FAMILY MEDICINE CLINIC | Facility: CLINIC | Age: 75
End: 2020-09-01

## 2020-09-01 ENCOUNTER — OFFICE VISIT (OUTPATIENT)
Dept: FAMILY MEDICINE CLINIC | Facility: CLINIC | Age: 75
End: 2020-09-01

## 2020-09-01 VITALS
TEMPERATURE: 99.1 F | HEART RATE: 83 BPM | OXYGEN SATURATION: 98 % | HEIGHT: 72 IN | SYSTOLIC BLOOD PRESSURE: 122 MMHG | BODY MASS INDEX: 24.76 KG/M2 | WEIGHT: 182.8 LBS | DIASTOLIC BLOOD PRESSURE: 76 MMHG | RESPIRATION RATE: 20 BRPM

## 2020-09-01 DIAGNOSIS — R21 RASH: Primary | ICD-10-CM

## 2020-09-01 DIAGNOSIS — I82.4Y1 ACUTE DEEP VEIN THROMBOSIS (DVT) OF PROXIMAL VEIN OF RIGHT LOWER EXTREMITY (HCC): ICD-10-CM

## 2020-09-01 DIAGNOSIS — R22.41 LOCALIZED SWELLING OF RIGHT LOWER EXTREMITY: ICD-10-CM

## 2020-09-01 PROBLEM — M79.604 ACUTE LEG PAIN, RIGHT: Status: ACTIVE | Noted: 2020-07-24

## 2020-09-01 PROBLEM — I82.409 DVT OF LOWER LIMB, ACUTE: Status: ACTIVE | Noted: 2020-08-25

## 2020-09-01 PROCEDURE — 99213 OFFICE O/P EST LOW 20 MIN: CPT | Performed by: NURSE PRACTITIONER

## 2020-09-01 NOTE — PATIENT INSTRUCTIONS
Will get labs today, pt informed to rest right leg, keep right leg elevated, and to use both crutches when ambulating. Pt informed to continue medications as prescribed and will call once labs results back to determine further treatment plan. Pt verb. Understanding.

## 2020-09-01 NOTE — TELEPHONE ENCOUNTER
Tried calling patient unable to leave message issues with voicemail patient needs to set up an appt.

## 2020-09-01 NOTE — PROGRESS NOTES
Subjective   Kendall Her is a 74 y.o.. male.     Pt admitting that he has been using one crutch recently and trying to use right leg more recently.    Rash   This is a new problem. Episode onset: 4 days. The problem has been gradually worsening since onset. Location: shaan. legs. He was exposed to nothing. Pertinent negatives include no congestion, cough, diarrhea, fever, rhinorrhea, shortness of breath, sore throat or vomiting.       The following portions of the patient's history were reviewed and updated as appropriate: allergies, current medications, past family history, past medical history, past social history, past surgical history and problem list.    Past Medical History:   Diagnosis Date   • Acid reflux    • Colon cancer screening 01/14/2019    Cologuard testing: Negative results   • Neck pain    • Osteopenia     lumbar spine   • Osteoporosis     left femoral neck   • Parathyroid adenoma    • Shoulder pain, left        Past Surgical History:   Procedure Laterality Date   • PARATHYROIDECTOMY N/A 4/19/2019    Procedure: right superior parathyroid and left superior parathyroid resection.;  Surgeon: Milagro Gordon MD;  Location: [facility];  Service: General   • TONSILLECTOMY     • TUMOR REMOVAL      AS A CHILD UNDER BREAST       Review of Systems   Constitutional: Negative.  Negative for fever.   HENT: Negative.  Negative for congestion, ear pain, rhinorrhea and sore throat.    Respiratory: Negative.  Negative for cough and shortness of breath.    Cardiovascular: Negative.  Negative for chest pain and palpitations.   Gastrointestinal: Negative.  Negative for diarrhea, nausea and vomiting.   Skin: Positive for rash.       Allergies   Allergen Reactions   • Peanut-Containing Drug Products Swelling     Patient states after eating some (more than a few) his throat seems to contract       Objective     Vitals:    09/01/20 1313   BP: 122/76   BP Location: Left arm   Patient Position: Sitting   Pulse: 83  "  Resp: 20   Temp: 99.1 °F (37.3 °C)   TempSrc: Oral   SpO2: 98%   Weight: 82.9 kg (182 lb 12.8 oz)   Height: 182.9 cm (72\")     Body mass index is 24.79 kg/m².    Physical Exam   Constitutional: He is oriented to person, place, and time. He appears well-developed.   HENT:   Head: Normocephalic.   Eyes: Pupils are equal, round, and reactive to light.   Cardiovascular: Normal rate and regular rhythm.   Pulmonary/Chest: Effort normal and breath sounds normal.   Musculoskeletal:   Uses cane, has limp   Neurological: He is alert and oriented to person, place, and time.   Skin:   Right lower extremity: +1 to + 2 edema, light pink macular rash noted, tender to palpation to all of lower extremity-(pt stating he has a skin sensitivity that has been improving)    Left lower extremity: trace ankle edema noted, no rash noted, non-tender to palpation   Vitals reviewed.        Current Outpatient Medications:   •  apixaban (ELIQUIS) 5 MG tablet tablet, Take 1 tablet by mouth Every 12 (Twelve) Hours., Disp: 60 tablet, Rfl: 4  •  gabapentin (NEURONTIN) 300 MG capsule, Take 300 mg by mouth 3 (Three) Times a Day., Disp: , Rfl:   •  Misc Natural Products (RESVERATROL DIET) capsule, , Disp: , Rfl:   •  Omega-3 1000 MG capsule, , Disp: , Rfl:   •  tiZANidine (ZANAFLEX) 4 MG tablet, Take 4 mg by mouth., Disp: , Rfl:   •  Turmeric 500 MG capsule, , Disp: , Rfl:   •  Unable to find, 1 each 1 (One) Time. Med Name: Relief Factor., Disp: , Rfl:     Recent Results (from the past 672 hour(s))   CBC AND DIFFERENTIAL    Collection Time: 08/12/20  2:08 PM   Result Value Ref Range    WBC 7.52 4.5 - 11.0 10*3/uL    RBC 5.27 4.5 - 5.9 10*6/uL    Hemoglobin 15.7 13.5 - 17.5 g/dL    Hematocrit 45.8 41.0 - 53.0 %    MCV 86.9 80.0 - 100.0 fL    MCH 29.8 26.0 - 34.0 pg    MCHC 34.3 31.0 - 37.0 g/dL    RDW 11.9 (L) 12.0 - 16.8 %    Platelets 190 140 - 440 10*3/uL    MPV 9.7 8.4 - 12.4 fL    Differential Type Hospital CBC w/AutoDiff (arb'U)    Neutrophil " Rel % 72.6 45 - 80 %    Lymphocyte Rel % 17.4 15 - 50 %    Monocyte Rel % 7.7 0 - 15 %    Eosinophil % 1.5 0 - 7 %    Basophil Rel % 0.5 0 - 2 %    Immature Grans % 0.3 0.0 - 1.0 %    nRBC 0 0 /100(WBC)    Neutrophils Absolute 5.46 2.0 - 8.8 10*3/uL    Lymphocytes Absolute 1.31 0.7 - 5.5 10*3/uL    Monocytes Absolute 0.58 0.0 - 1.7 10*3/uL    Eosinophils Absolute 0.11 0.0 - 0.8 10*3/uL    Basophils Absolute 0.04 0.0 - 0.2 10*3/uL    Immature Grans, Absolute 0.02 0.00 - 0.10 10*3/uL   COMPREHENSIVE METABOLIC PANEL    Collection Time: 08/12/20  2:08 PM   Result Value Ref Range    Sodium 139 137 - 145 mmol/L    Potassium 4.5 3.5 - 5.1 mmol/L    Chloride 103 98 - 107 mmol/L    Total CO2 32 (H) 22 - 30 mmol/L    Glucose 102 (H) 74 - 99 mg/dL    BUN 13 7 - 20 mg/dL    Creatinine 0.7 0.7 - 1.5 mg/dL    BUN/Creatinine Ratio 17.8 RATIO    Est GFR by Clearance >60 >60 /1.73 m2    Total Protein 6.8 6.3 - 8.2 g/dL    Albumin 4.1 3.5 - 5.0 g/dL    Globulin 2.7 1.5 - 4.5 g/dL    A/G Ratio 1.5 1.1 - 2.5 RATIO    Calcium 9.1 8.4 - 10.2 mg/dL    Total Bilirubin 1.0 0.2 - 1.3 mg/dL    AST (SGOT) 36 15 - 46 U/L    ALT (SGPT) 38 0 - 50 U/L    Alkaline Phosphatase 53 38 - 126 U/L   APTT    Collection Time: 08/12/20  2:08 PM   Result Value Ref Range    PTT 28.9 25.3 - 35.0 s   PROTIME-INR    Collection Time: 08/12/20  2:08 PM   Result Value Ref Range    Protime 11.0 10.3 - 13.3 s    INR 1.0 INR       XR Knee 3 View Right  Ordering physician's impression is located in the Encounter Note dated 07/30/20. X-ray performed in the DR room.  results documented in notes showed: mild patellofemoral OA     Assessment/Plan   Kendall was seen today for rash.    Diagnoses and all orders for this visit:    Rash    Localized swelling of right lower extremity  -     CBC & Differential  -     Comprehensive metabolic panel    Acute deep vein thrombosis (DVT) of proximal vein of right lower extremity (CMS/HCC)  -     Protime-INR        Patient Instructions    Will get labs today, pt informed to rest right leg, keep right leg elevated, and to use both crutches when ambulating. Pt informed to continue medications as prescribed and will call once labs results back to determine further treatment plan. Pt verb. Understanding.      Return if symptoms worsen or fail to improve.      Answers for HPI/ROS submitted by the patient on 9/1/2020   What is the primary reason for your visit?: Other  Please describe your symptoms.: Numerous reddish splotches now appearing on my lower right leg.  Started right after I saw Dr. Ivan on 8-25, so I doubt he observed any.  I'm on blood thinner.  My spine Dr. is alarmed and urged me to see my regular Dr. immediately.  Also alarmed that I'm not on a monitoring schedule for the Eliquis blood thinner.  Have you had these symptoms before?: No  How long have you been having these symptoms?: 5-7 days  Please list any medications you are currently taking for this condition.: Eliquis (apixaban) 5mg. take one tab am, one tab pm.  Also I'm taking 2 muscle relaxers.  Please describe any probable cause for these symptoms. : Concern is obviously related to being possible effects of blood thinner.  , Also that I don't see a Doctor regularly to monitor the blood thinner.

## 2020-09-02 LAB
ALBUMIN SERPL-MCNC: 4.4 G/DL (ref 3.5–5.2)
ALBUMIN/GLOB SERPL: 2.3 G/DL
ALP SERPL-CCNC: 54 U/L (ref 39–117)
ALT SERPL-CCNC: 32 U/L (ref 1–41)
AST SERPL-CCNC: 19 U/L (ref 1–40)
BASOPHILS # BLD AUTO: 0.05 10*3/MM3 (ref 0–0.2)
BASOPHILS NFR BLD AUTO: 0.7 % (ref 0–1.5)
BILIRUB SERPL-MCNC: 0.6 MG/DL (ref 0–1.2)
BUN SERPL-MCNC: 11 MG/DL (ref 8–23)
BUN/CREAT SERPL: 12 (ref 7–25)
CALCIUM SERPL-MCNC: 9.2 MG/DL (ref 8.6–10.5)
CHLORIDE SERPL-SCNC: 104 MMOL/L (ref 98–107)
CO2 SERPL-SCNC: 31.1 MMOL/L (ref 22–29)
CREAT SERPL-MCNC: 0.92 MG/DL (ref 0.76–1.27)
EOSINOPHIL # BLD AUTO: 0.15 10*3/MM3 (ref 0–0.4)
EOSINOPHIL NFR BLD AUTO: 2.1 % (ref 0.3–6.2)
ERYTHROCYTE [DISTWIDTH] IN BLOOD BY AUTOMATED COUNT: 12.4 % (ref 12.3–15.4)
GLOBULIN SER CALC-MCNC: 1.9 GM/DL
GLUCOSE SERPL-MCNC: 88 MG/DL (ref 65–99)
HCT VFR BLD AUTO: 45.5 % (ref 37.5–51)
HGB BLD-MCNC: 15.6 G/DL (ref 13–17.7)
IMM GRANULOCYTES # BLD AUTO: 0.03 10*3/MM3 (ref 0–0.05)
IMM GRANULOCYTES NFR BLD AUTO: 0.4 % (ref 0–0.5)
INR PPP: 1.07 (ref 0.9–1.1)
LYMPHOCYTES # BLD AUTO: 1.2 10*3/MM3 (ref 0.7–3.1)
LYMPHOCYTES NFR BLD AUTO: 17 % (ref 19.6–45.3)
MCH RBC QN AUTO: 29.5 PG (ref 26.6–33)
MCHC RBC AUTO-ENTMCNC: 34.3 G/DL (ref 31.5–35.7)
MCV RBC AUTO: 86.2 FL (ref 79–97)
MONOCYTES # BLD AUTO: 0.48 10*3/MM3 (ref 0.1–0.9)
MONOCYTES NFR BLD AUTO: 6.8 % (ref 5–12)
NEUTROPHILS # BLD AUTO: 5.14 10*3/MM3 (ref 1.7–7)
NEUTROPHILS NFR BLD AUTO: 73 % (ref 42.7–76)
NRBC BLD AUTO-RTO: 0 /100 WBC (ref 0–0.2)
PLATELET # BLD AUTO: 228 10*3/MM3 (ref 140–450)
POTASSIUM SERPL-SCNC: 3.9 MMOL/L (ref 3.5–5.2)
PROT SERPL-MCNC: 6.3 G/DL (ref 6–8.5)
PROTHROMBIN TIME: 13.7 SECONDS (ref 11.7–14.2)
RBC # BLD AUTO: 5.28 10*6/MM3 (ref 4.14–5.8)
SODIUM SERPL-SCNC: 142 MMOL/L (ref 136–145)
WBC # BLD AUTO: 7.05 10*3/MM3 (ref 3.4–10.8)

## 2021-01-15 DIAGNOSIS — E55.9 VITAMIN D DEFICIENCY: ICD-10-CM

## 2021-01-15 DIAGNOSIS — E78.5 DIET-CONTROLLED HYPERLIPIDEMIA: ICD-10-CM

## 2021-01-15 DIAGNOSIS — Z12.5 SPECIAL SCREENING FOR MALIGNANT NEOPLASM OF PROSTATE: ICD-10-CM

## 2021-01-15 DIAGNOSIS — E34.9 ELEVATED PARATHYROID HORMONE: ICD-10-CM

## 2021-01-19 LAB
25(OH)D3+25(OH)D2 SERPL-MCNC: 24.9 NG/ML (ref 30–100)
ALBUMIN SERPL-MCNC: 4.2 G/DL (ref 3.7–4.7)
ALBUMIN/GLOB SERPL: 2 {RATIO} (ref 1.2–2.2)
ALP SERPL-CCNC: 60 IU/L (ref 39–117)
ALT SERPL-CCNC: 23 IU/L (ref 0–44)
AST SERPL-CCNC: 23 IU/L (ref 0–40)
BASOPHILS # BLD AUTO: 0.1 X10E3/UL (ref 0–0.2)
BASOPHILS NFR BLD AUTO: 1 %
BILIRUB SERPL-MCNC: 0.7 MG/DL (ref 0–1.2)
BUN SERPL-MCNC: 18 MG/DL (ref 8–27)
BUN/CREAT SERPL: 19 (ref 10–24)
CALCIUM SERPL-MCNC: 9.2 MG/DL (ref 8.6–10.2)
CHLORIDE SERPL-SCNC: 107 MMOL/L (ref 96–106)
CHOLEST SERPL-MCNC: 149 MG/DL (ref 100–199)
CO2 SERPL-SCNC: 25 MMOL/L (ref 20–29)
CREAT SERPL-MCNC: 0.93 MG/DL (ref 0.76–1.27)
EOSINOPHIL # BLD AUTO: 0.3 X10E3/UL (ref 0–0.4)
EOSINOPHIL NFR BLD AUTO: 5 %
ERYTHROCYTE [DISTWIDTH] IN BLOOD BY AUTOMATED COUNT: 12.4 % (ref 11.6–15.4)
GLOBULIN SER CALC-MCNC: 2.1 G/DL (ref 1.5–4.5)
GLUCOSE SERPL-MCNC: 80 MG/DL (ref 65–99)
HCT VFR BLD AUTO: 46.8 % (ref 37.5–51)
HDLC SERPL-MCNC: 53 MG/DL
HGB BLD-MCNC: 15.8 G/DL (ref 13–17.7)
IMM GRANULOCYTES # BLD AUTO: 0 X10E3/UL (ref 0–0.1)
IMM GRANULOCYTES NFR BLD AUTO: 0 %
LDLC SERPL CALC-MCNC: 73 MG/DL (ref 0–99)
LDLC/HDLC SERPL: 1.4 RATIO (ref 0–3.6)
LYMPHOCYTES # BLD AUTO: 2 X10E3/UL (ref 0.7–3.1)
LYMPHOCYTES NFR BLD AUTO: 32 %
MCH RBC QN AUTO: 28.8 PG (ref 26.6–33)
MCHC RBC AUTO-ENTMCNC: 33.8 G/DL (ref 31.5–35.7)
MCV RBC AUTO: 85 FL (ref 79–97)
MONOCYTES # BLD AUTO: 0.5 X10E3/UL (ref 0.1–0.9)
MONOCYTES NFR BLD AUTO: 9 %
NEUTROPHILS # BLD AUTO: 3.4 X10E3/UL (ref 1.4–7)
NEUTROPHILS NFR BLD AUTO: 53 %
PLATELET # BLD AUTO: 218 X10E3/UL (ref 150–450)
POTASSIUM SERPL-SCNC: 4.3 MMOL/L (ref 3.5–5.2)
PROT SERPL-MCNC: 6.3 G/DL (ref 6–8.5)
PSA SERPL-MCNC: 4.4 NG/ML (ref 0–4)
PTH-INTACT SERPL-MCNC: 34 PG/ML (ref 15–65)
RBC # BLD AUTO: 5.49 X10E6/UL (ref 4.14–5.8)
SODIUM SERPL-SCNC: 144 MMOL/L (ref 134–144)
TRIGL SERPL-MCNC: 132 MG/DL (ref 0–149)
TSH SERPL DL<=0.005 MIU/L-ACNC: 6.37 UIU/ML (ref 0.45–4.5)
VLDLC SERPL CALC-MCNC: 23 MG/DL (ref 5–40)
WBC # BLD AUTO: 6.3 X10E3/UL (ref 3.4–10.8)

## 2021-01-21 ENCOUNTER — OFFICE VISIT (OUTPATIENT)
Dept: FAMILY MEDICINE CLINIC | Facility: CLINIC | Age: 76
End: 2021-01-21

## 2021-01-21 VITALS
HEART RATE: 77 BPM | RESPIRATION RATE: 16 BRPM | DIASTOLIC BLOOD PRESSURE: 73 MMHG | OXYGEN SATURATION: 99 % | WEIGHT: 186 LBS | SYSTOLIC BLOOD PRESSURE: 113 MMHG | HEIGHT: 72 IN | BODY MASS INDEX: 25.19 KG/M2 | TEMPERATURE: 96.9 F

## 2021-01-21 DIAGNOSIS — R97.20 PSA ELEVATION: ICD-10-CM

## 2021-01-21 DIAGNOSIS — M51.16 LUMBAR DISC DISEASE WITH RADICULOPATHY: ICD-10-CM

## 2021-01-21 DIAGNOSIS — M79.604 ACUTE LEG PAIN, RIGHT: ICD-10-CM

## 2021-01-21 DIAGNOSIS — E55.9 VITAMIN D DEFICIENCY: ICD-10-CM

## 2021-01-21 DIAGNOSIS — M81.0 OSTEOPOROSIS, UNSPECIFIED OSTEOPOROSIS TYPE, UNSPECIFIED PATHOLOGICAL FRACTURE PRESENCE: ICD-10-CM

## 2021-01-21 DIAGNOSIS — E78.5 DIET-CONTROLLED HYPERLIPIDEMIA: Primary | ICD-10-CM

## 2021-01-21 DIAGNOSIS — R79.89 TSH ELEVATION: ICD-10-CM

## 2021-01-21 PROCEDURE — 99214 OFFICE O/P EST MOD 30 MIN: CPT | Performed by: INTERNAL MEDICINE

## 2021-01-21 NOTE — PROGRESS NOTES
Subjective  Answers for HPI/ROS submitted by the patient on 1/14/2021   What is the primary reason for your visit?: Other  Please describe your symptoms.: Discuss right leg issues, from L3-L4 diagnosis of nerve damage, dating back to July 24th.  Much progress since then, but more recently have seen no progress.  Now in Physical Therapy for right leg various pains, which as of today 1/14 is only about 6 sessions, plus home directives.  , Nurse Practitioner at [place] prescribed this, because I told her I was seeing almost no progress.  She also referred me to Dr. Dye for TALK ABOUT an epidural shot (which at this point I don't anticipate).  BUT he asked me - if I DID proceed to a shot, what does Dr. Ivan advise relative to the Eliquis use...Dr. Dye says he typically sees patients discontinue usage 3 days before the shot(s).  , My goal has been full recovery.  Now in my 6th month since onset, hoping to get to that goal soon., , Relative to the blood clot issue in right leg - swelling is not major, but worsens with activity and not much horizontal time during day.  Socks - even on left leg, now leave lower leg impression by evening.  Have you had these symptoms before?: Yes  How long have you been having these symptoms?: Greater than 2 weeks  Please list any medications you are currently taking for this condition.: Eliquis 5mg twice daily.  Please describe any probable cause for these symptoms. : Unknown reason for the spine misalignment of L3/L4 that evidently pinched the nerve leading to my right leg., I first experience the (severe) pain on July 24th, went to [facility], where the whole process began., (should add that had been having substantial pain in right buttocks for months from prolonged sitting - and still do, even after only 20 minutes), Blood clot was discovered (at [facility]) during treatment for these right leg pain issues.    Kendall Her is a 75 y.o. male. 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  He is generally been stable.  He does have some right leg pain coming from his back that stable and tolerable.  Otherwise he has not had any major acute problems.  He does have some mild edema that is not really a bother  Chief Complaint   Patient presents with   • Hyperlipidemia     Lab F/U   • Vitamin D Deficiency          Vitals:    01/21/21 0957   BP: 113/73   Pulse: 77   Resp: 16   Temp: 96.9 °F (36.1 °C)   SpO2: 99%     Body mass index is 25.23 kg/m².  The following portions of the patient's history were reviewed and updated as appropriate: allergies, current medications, past family history, past medical history, past social history, past surgical history and problem list.    Past Medical History:   Diagnosis Date   • Acid reflux    • Colon cancer screening 01/14/2019    Cologuard testing: Negative results   • Neck pain    • Osteopenia     lumbar spine   • Osteoporosis     left femoral neck   • Parathyroid adenoma    • Shoulder pain, left       Allergies   Allergen Reactions   • Peanut-Containing Drug Products Swelling     Patient states after eating some (more than a few) his throat seems to contract      Social History     Socioeconomic History   • Marital status:      Spouse name: Not on file   • Number of children: Not on file   • Years of education: Not on file   • Highest education level: Not on file   Tobacco Use   • Smoking status: Never Smoker   • Smokeless tobacco: Never Used   Substance and Sexual Activity   • Alcohol use: Yes     Alcohol/week: 3.0 - 5.0 standard drinks     Types: 3 - 5 Cans of beer per week     Comment: weekly   • Drug use: No   • Sexual activity: Defer        Current Outpatient Medications:   •  apixaban (ELIQUIS) 5 MG tablet tablet, Take 1 tablet by mouth Every 12 (Twelve) Hours., Disp: 180 tablet, Rfl: 3  •  Misc Natural Products  (RESVERATROL DIET) capsule, , Disp: , Rfl:   •  Unable to find, 1 each 1 (One) Time. Med Name: Relief Factor., Disp: , Rfl:      Objective     History of Present Illness     Review of Systems   Constitutional: Negative.    HENT: Negative.    Respiratory: Negative.    Cardiovascular: Negative.    Gastrointestinal: Negative.    Genitourinary: Negative.    Musculoskeletal: Positive for back pain.   Neurological: Negative.    Hematological: Negative.    Psychiatric/Behavioral: Negative.        Physical Exam  Vitals signs and nursing note reviewed.   Constitutional:       General: He is not in acute distress.     Appearance: Normal appearance. He is normal weight. He is not ill-appearing.   HENT:      Head: Normocephalic and atraumatic.   Eyes:      General: No scleral icterus.     Conjunctiva/sclera: Conjunctivae normal.   Neck:      Musculoskeletal: Normal range of motion and neck supple.   Cardiovascular:      Rate and Rhythm: Normal rate and regular rhythm.      Heart sounds: Normal heart sounds.   Pulmonary:      Effort: Pulmonary effort is normal. No respiratory distress.      Breath sounds: Normal breath sounds. No wheezing or rales.   Musculoskeletal: Normal range of motion.   Skin:     General: Skin is warm and dry.   Neurological:      General: No focal deficit present.      Mental Status: He is alert and oriented to person, place, and time.   Psychiatric:         Mood and Affect: Mood normal.         Behavior: Behavior normal.         ASSESSMENT CBC is normal.  CMP had a normal sugar of 80 and otherwise was essentially normal.  Lipid panel is good on no medications with total cholesterol 149, HDL 53 and LDL 73.  PSA is minimally high at 4.4.  TSH is slightly high at 6.37.  Vitamin D level is 24.9, minimally low and PTH was quite normal at 34.  Patient's last bone density test was in 2016.  #1-hyperlipidemia, will normal on diet  #2-vitamin D deficiency, asymptomatic  #3-PSA elevation, asymptomatic and  new  #4-osteoporosis, asymptomatic  #5-radiculopathy in the right leg from lumbar disc disease, stable  #6-TSH elevation, asymptomatic and clinically euthyroid     Problems Addressed this Visit        Cardiac and Vasculature    Diet-controlled hyperlipidemia - Primary       Endocrine and Metabolic    Vitamin D deficiency       Genitourinary and Reproductive     PSA elevation       Musculoskeletal and Injuries    Osteoporosis    Relevant Orders    DEXA Bone Density Axial    Acute leg pain, right       Neuro    Lumbar disc disease with radiculopathy       Symptoms and Signs    TSH elevation      Diagnoses       Codes Comments    Diet-controlled hyperlipidemia    -  Primary ICD-10-CM: E78.5  ICD-9-CM: 272.4     Vitamin D deficiency     ICD-10-CM: E55.9  ICD-9-CM: 268.9     Acute leg pain, right     ICD-10-CM: M79.604  ICD-9-CM: 729.5     Lumbar disc disease with radiculopathy     ICD-10-CM: M51.16  ICD-9-CM: 722.10, 724.4     TSH elevation     ICD-10-CM: R79.89  ICD-9-CM: 794.5     Osteoporosis, unspecified osteoporosis type, unspecified pathological fracture presence     ICD-10-CM: M81.0  ICD-9-CM: 733.00     PSA elevation     ICD-10-CM: R97.20  ICD-9-CM: 790.93           PLAN I have ordered a bone density test for the patient and he will continue current medicines as now.  I would like to recheck him in 6 months with a CMP, PSA, TSH and free T4, vitamin D level and PTH level    There are no Patient Instructions on file for this visit.  Return in about 6 months (around 7/21/2021) for with labs.

## 2021-02-10 ENCOUNTER — HOSPITAL ENCOUNTER (OUTPATIENT)
Dept: BONE DENSITY | Facility: HOSPITAL | Age: 76
Discharge: HOME | End: 2021-02-10
Admitting: INTERNAL MEDICINE

## 2021-02-10 DIAGNOSIS — M81.0 OSTEOPOROSIS, UNSPECIFIED OSTEOPOROSIS TYPE, UNSPECIFIED PATHOLOGICAL FRACTURE PRESENCE: ICD-10-CM

## 2021-02-10 PROCEDURE — 77080 DXA BONE DENSITY AXIAL: CPT

## 2021-02-23 ENCOUNTER — OFFICE VISIT (OUTPATIENT)
Dept: FAMILY MEDICINE CLINIC | Facility: CLINIC | Age: 76
End: 2021-02-23

## 2021-02-23 DIAGNOSIS — E55.9 VITAMIN D DEFICIENCY: ICD-10-CM

## 2021-02-23 DIAGNOSIS — Z23 HIGH PRIORITY FOR COVID-19 VACCINATION: ICD-10-CM

## 2021-02-23 DIAGNOSIS — M81.0 OSTEOPOROSIS, UNSPECIFIED OSTEOPOROSIS TYPE, UNSPECIFIED PATHOLOGICAL FRACTURE PRESENCE: Primary | ICD-10-CM

## 2021-02-23 PROCEDURE — 99443 PR PHYS/QHP TELEPHONE EVALUATION 21-30 MIN: CPT | Performed by: INTERNAL MEDICINE

## 2021-02-23 RX ORDER — MELATONIN
2000 DAILY
COMMUNITY

## 2021-02-23 RX ORDER — ALENDRONATE SODIUM 70 MG/1
70 TABLET ORAL
Qty: 12 TABLET | Refills: 3 | Status: SHIPPED | OUTPATIENT
Start: 2021-02-23 | End: 2021-07-23 | Stop reason: SDDI

## 2021-02-23 NOTE — PROGRESS NOTES
Subjective   Kendall Her is a 75 y.o. male. Patient is here today for a telephone visit due to the COVID-19 pandemic.  Patient was specifically asked about a telephone visit and consents to a telephone visit.  He is feeling well.  He does have some concerns about the COVID-19 vaccination that he wanted to discuss and also he wanted to review his bone density results.  He has had no fractures.     No chief complaint on file.         There were no vitals filed for this visit.  There is no height or weight on file to calculate BMI.  The following portions of the patient's history were reviewed and updated as appropriate: allergies, current medications, past family history, past medical history, past social history, past surgical history and problem list.    Past Medical History:   Diagnosis Date   • Acid reflux    • Colon cancer screening 01/14/2019    Cologuard testing: Negative results   • Neck pain    • Osteopenia     lumbar spine   • Osteoporosis     left femoral neck   • Parathyroid adenoma    • Shoulder pain, left       Allergies   Allergen Reactions   • Peanut-Containing Drug Products Swelling     Patient states after eating some (more than a few) his throat seems to contract      Social History     Socioeconomic History   • Marital status:      Spouse name: Not on file   • Number of children: Not on file   • Years of education: Not on file   • Highest education level: Not on file   Tobacco Use   • Smoking status: Never Smoker   • Smokeless tobacco: Never Used   Substance and Sexual Activity   • Alcohol use: Yes     Alcohol/week: 3.0 - 5.0 standard drinks     Types: 3 - 5 Cans of beer per week     Comment: weekly   • Drug use: No   • Sexual activity: Defer        Current Outpatient Medications:   •  cholecalciferol (VITAMIN D3) 25 MCG (1000 UT) tablet, Take 1,000 Units by mouth Daily., Disp: , Rfl:   •  alendronate (Fosamax) 70 MG tablet, Take 1 tablet by mouth Every 7 (Seven) Days., Disp: 12 tablet,  Rfl: 3  •  apixaban (ELIQUIS) 5 MG tablet tablet, Take 1 tablet by mouth Every 12 (Twelve) Hours., Disp: 180 tablet, Rfl: 3  •  Misc Natural Products (RESVERATROL DIET) capsule, , Disp: , Rfl:   •  Unable to find, 1 each 1 (One) Time. Med Name: Relief Factor., Disp: , Rfl:      Objective     History of Present Illness     Review of Systems   Constitutional: Negative.    HENT: Negative.    Eyes: Negative.    Respiratory: Negative.    Cardiovascular: Negative.    Gastrointestinal: Negative.    Genitourinary: Negative.    Musculoskeletal: Negative.    Skin: Negative.    Neurological: Negative.    Hematological: Negative.    Psychiatric/Behavioral: Negative.        Physical Exam    ASSESSMENT 1-we had a discussion about the Covid 19 vaccinations and their excellent safety data thus far.  I have no reservations in strongly recommending that every body get the vaccinations, including this patient.  He has already had his first vaccination without any side effects  #2-osteoporosis.  His bone density showed increased weakening in the bones and we discussed medication options.  #3-vitamin D deficiency, will start vitamin D supplementation     Problems Addressed this Visit        Endocrine and Metabolic    Vitamin D deficiency       Musculoskeletal and Injuries    Osteoporosis - Primary      Diagnoses       Codes Comments    Osteoporosis, unspecified osteoporosis type, unspecified pathological fracture presence    -  Primary ICD-10-CM: M81.0  ICD-9-CM: 733.00     Vitamin D deficiency     ICD-10-CM: E55.9  ICD-9-CM: 268.9           PLAN after discussion with the patient I am going to start him on vitamin D, 1000 units daily and alendronate 70 mg weekly.  He was instructed in the precautions and correct way to take the alendronate.  Patient will be rechecked as already scheduled in July and I would like to add in a vitamin D level to the labs for that visit.  This was a telephone visit due to the COVID-19 pandemic.  Total time  spent was 22 minutes    There are no Patient Instructions on file for this visit.  No follow-ups on file.

## 2021-07-08 DIAGNOSIS — R94.6 ABNORMAL RESULTS OF THYROID FUNCTION STUDIES: ICD-10-CM

## 2021-07-08 DIAGNOSIS — R97.20 PSA ELEVATION: ICD-10-CM

## 2021-07-08 DIAGNOSIS — I82.4Y1 ACUTE DEEP VEIN THROMBOSIS (DVT) OF PROXIMAL VEIN OF RIGHT LOWER EXTREMITY (HCC): ICD-10-CM

## 2021-07-08 DIAGNOSIS — E55.9 VITAMIN D DEFICIENCY: ICD-10-CM

## 2021-07-23 ENCOUNTER — OFFICE VISIT (OUTPATIENT)
Dept: FAMILY MEDICINE CLINIC | Facility: CLINIC | Age: 76
End: 2021-07-23

## 2021-07-23 VITALS
TEMPERATURE: 98.4 F | RESPIRATION RATE: 18 BRPM | HEIGHT: 72 IN | DIASTOLIC BLOOD PRESSURE: 78 MMHG | OXYGEN SATURATION: 96 % | WEIGHT: 184.6 LBS | HEART RATE: 87 BPM | BODY MASS INDEX: 25 KG/M2 | SYSTOLIC BLOOD PRESSURE: 110 MMHG

## 2021-07-23 DIAGNOSIS — E78.5 DIET-CONTROLLED HYPERLIPIDEMIA: Primary | ICD-10-CM

## 2021-07-23 DIAGNOSIS — E55.9 VITAMIN D DEFICIENCY: ICD-10-CM

## 2021-07-23 DIAGNOSIS — R79.89 TSH ELEVATION: ICD-10-CM

## 2021-07-23 DIAGNOSIS — R97.20 PSA ELEVATION: ICD-10-CM

## 2021-07-23 DIAGNOSIS — M81.0 OSTEOPOROSIS, UNSPECIFIED OSTEOPOROSIS TYPE, UNSPECIFIED PATHOLOGICAL FRACTURE PRESENCE: ICD-10-CM

## 2021-07-23 PROCEDURE — 99214 OFFICE O/P EST MOD 30 MIN: CPT | Performed by: INTERNAL MEDICINE

## 2021-07-23 NOTE — PROGRESS NOTES
"Subjective  Answers for HPI/ROS submitted by the patient on 7/22/2021  What is the primary reason for your visit?: Other  Please describe your symptoms.: This is a long scheduled \"routine\" visit.   , However, regarding my right leg issues - triggered by pinched nerve beginning July 24, 2020 - I have diminished pain in my right buttocks now from sitting more than 45 minutes, so I am now considering auto travel again.  Right knee pain is very minimal.  Walking is almost normal, but I have significant weakness in legs.  I do walk regularly (still cut my grass).  A few weeks ago, I got sharp pains in LEFT knee that occur with incidental stress, and then stop.  Maybe because have been \"overusing\" that leg since the July 2020 ER visit.  But this new pain means I try to avoid going UP stairs, can go down fine., Getting older is not for amateurs.  Have you had these symptoms before?: No  How long have you been having these symptoms?: Greater than 2 weeks  Please list any medications you are currently taking for this condition.: Not using any pain meds.  Still on Eliquis.  Please describe any probable cause for these symptoms. : I have no cause in mind, for either pain origin.      Kendall Her is a 75 y.o. male. Patient is here today for follow-up on his hyperlipidemia, vitamin D deficiency, history of PSA elevation.  He also has an elevated TSH.  His energy level feels good and aside from his leg problems he has been stable.  Chief Complaint   Patient presents with   • Hyperlipidemia     FOLLOW UP LABS          Vitals:    07/23/21 1537   BP: 110/78   Pulse: 87   Resp: 18   Temp: 98.4 °F (36.9 °C)   SpO2: 96%     Body mass index is 25.03 kg/m².  The following portions of the patient's history were reviewed and updated as appropriate: allergies, current medications, past family history, past medical history, past social history, past surgical history and problem list.    Past Medical History:   Diagnosis Date   • Acid " reflux    • Colon cancer screening 01/14/2019    Cologuard testing: Negative results   • Neck pain    • Osteopenia     lumbar spine   • Osteoporosis     left femoral neck   • Parathyroid adenoma    • Shoulder pain, left       Allergies   Allergen Reactions   • Peanut-Containing Drug Products Swelling     Patient states after eating some (more than a few) his throat seems to contract      Social History     Socioeconomic History   • Marital status:      Spouse name: Not on file   • Number of children: Not on file   • Years of education: Not on file   • Highest education level: Not on file   Tobacco Use   • Smoking status: Never Smoker   • Smokeless tobacco: Never Used   Substance and Sexual Activity   • Alcohol use: Yes     Alcohol/week: 3.0 - 5.0 standard drinks     Types: 3 - 5 Cans of beer per week     Comment: weekly   • Drug use: No   • Sexual activity: Defer        Current Outpatient Medications:   •  apixaban (ELIQUIS) 5 MG tablet tablet, Take 1 tablet by mouth Every 12 (Twelve) Hours., Disp: 180 tablet, Rfl: 3  •  cholecalciferol (VITAMIN D3) 25 MCG (1000 UT) tablet, Take 1,000 Units by mouth Daily., Disp: , Rfl:   •  Misc Natural Products (RESVERATROL DIET) capsule, , Disp: , Rfl:   •  Unable to find, 1 each 1 (One) Time. Med Name: Relief Factor., Disp: , Rfl:      Objective     History of Present Illness     Review of Systems   Constitutional: Negative.    HENT: Negative.    Respiratory: Negative.    Cardiovascular: Negative.    Gastrointestinal: Negative.    Genitourinary: Negative.    Musculoskeletal: Negative.    Skin: Negative.    Neurological: Negative.    Hematological: Negative.    Psychiatric/Behavioral: Negative.        Physical Exam  Vitals and nursing note reviewed.   Constitutional:       General: He is not in acute distress.     Appearance: Normal appearance. He is not ill-appearing.   HENT:      Head: Normocephalic and atraumatic.   Eyes:      General: No scleral icterus.      Conjunctiva/sclera: Conjunctivae normal.   Cardiovascular:      Rate and Rhythm: Normal rate and regular rhythm.      Heart sounds: Normal heart sounds.   Pulmonary:      Effort: Pulmonary effort is normal. No respiratory distress.      Breath sounds: Normal breath sounds. No wheezing or rales.   Musculoskeletal:         General: Normal range of motion.      Cervical back: Normal range of motion and neck supple.   Skin:     General: Skin is warm and dry.   Neurological:      General: No focal deficit present.      Mental Status: He is alert and oriented to person, place, and time.   Psychiatric:         Mood and Affect: Mood normal.         Behavior: Behavior normal.         ASSESSMENT CMP is normal.  TSH is elevated but stable at 6.74 and free T4 is normal at 1.09.  Vitamin D level is normal at 44.2.  PSA is again elevated and has increased to 5.69.  #1-vitamin D deficiency, corrected  #2-TSH elevation with good energy level, clinically euthyroid  #3-increased PSA with again an increase     Problems Addressed this Visit        Cardiac and Vasculature    Diet-controlled hyperlipidemia - Primary       Endocrine and Metabolic    Vitamin D deficiency       Genitourinary and Reproductive     PSA elevation    Relevant Orders    Ambulatory Referral to Urology       Musculoskeletal and Injuries    Osteoporosis       Symptoms and Signs    TSH elevation      Diagnoses       Codes Comments    Diet-controlled hyperlipidemia    -  Primary ICD-10-CM: E78.5  ICD-9-CM: 272.4     Vitamin D deficiency     ICD-10-CM: E55.9  ICD-9-CM: 268.9     PSA elevation     ICD-10-CM: R97.20  ICD-9-CM: 790.93     Osteoporosis, unspecified osteoporosis type, unspecified pathological fracture presence     ICD-10-CM: M81.0  ICD-9-CM: 733.00     TSH elevation     ICD-10-CM: R79.89  ICD-9-CM: 794.5           PLAN I recommended the patient get a Tdap immunization and a flu shot in October.  I am referring him to urology, Dr. Bunn's group for  evaluation for his increasingly high PSA.  The patient will continue current medicines and I will recheck him in 6 months with a CBC, CMP, lipid panel, TSH and free T4, vitamin D level    There are no Patient Instructions on file for this visit.  Return in about 6 months (around 1/23/2022) for with labs.

## 2021-09-02 ENCOUNTER — APPOINTMENT (OUTPATIENT)
Dept: CARDIOLOGY | Facility: HOSPITAL | Age: 76
End: 2021-09-02

## 2021-09-02 ENCOUNTER — HOSPITAL ENCOUNTER (OUTPATIENT)
Facility: HOSPITAL | Age: 76
Setting detail: OBSERVATION
Discharge: HOME | End: 2021-09-03
Attending: EMERGENCY MEDICINE | Admitting: INTERNAL MEDICINE

## 2021-09-02 DIAGNOSIS — K62.5 RECTAL BLEEDING: Primary | ICD-10-CM

## 2021-09-02 DIAGNOSIS — Z79.01 ANTICOAGULATED: ICD-10-CM

## 2021-09-02 PROBLEM — E87.6 HYPOKALEMIA: Status: ACTIVE | Noted: 2021-09-02

## 2021-09-02 PROBLEM — Z98.890 HX OF PROSTATE BIOPSY: Status: ACTIVE | Noted: 2021-09-02

## 2021-09-02 PROBLEM — R31.9 HEMATURIA: Status: ACTIVE | Noted: 2021-09-02

## 2021-09-02 PROBLEM — Z86.718 HISTORY OF DEEP VEIN THROMBOSIS (DVT) OF LOWER EXTREMITY: Status: ACTIVE | Noted: 2021-09-02

## 2021-09-02 PROBLEM — R55 SYNCOPE: Status: ACTIVE | Noted: 2021-09-02

## 2021-09-02 LAB
ABO GROUP BLD: NORMAL
ALBUMIN SERPL-MCNC: 4.1 G/DL (ref 3.5–5.2)
ALBUMIN/GLOB SERPL: 2.4 G/DL
ALP SERPL-CCNC: 49 U/L (ref 39–117)
ALT SERPL W P-5'-P-CCNC: 20 U/L (ref 1–41)
ANION GAP SERPL CALCULATED.3IONS-SCNC: 7.7 MMOL/L (ref 5–15)
AST SERPL-CCNC: 18 U/L (ref 1–40)
BASOPHILS # BLD AUTO: 0.06 10*3/MM3 (ref 0–0.2)
BASOPHILS NFR BLD AUTO: 0.5 % (ref 0–1.5)
BH CV LOWER VASCULAR LEFT COMMON FEMORAL AUGMENT: NORMAL
BH CV LOWER VASCULAR LEFT COMMON FEMORAL COMPETENT: NORMAL
BH CV LOWER VASCULAR LEFT COMMON FEMORAL COMPRESS: NORMAL
BH CV LOWER VASCULAR LEFT COMMON FEMORAL PHASIC: NORMAL
BH CV LOWER VASCULAR LEFT COMMON FEMORAL SPONT: NORMAL
BH CV LOWER VASCULAR RIGHT COMMON FEMORAL AUGMENT: NORMAL
BH CV LOWER VASCULAR RIGHT COMMON FEMORAL COMPETENT: NORMAL
BH CV LOWER VASCULAR RIGHT COMMON FEMORAL COMPRESS: NORMAL
BH CV LOWER VASCULAR RIGHT COMMON FEMORAL PHASIC: NORMAL
BH CV LOWER VASCULAR RIGHT COMMON FEMORAL SPONT: NORMAL
BH CV LOWER VASCULAR RIGHT DISTAL FEMORAL COMPRESS: NORMAL
BH CV LOWER VASCULAR RIGHT GASTRONEMIUS COMPRESS: NORMAL
BH CV LOWER VASCULAR RIGHT GREATER SAPH AK COMPRESS: NORMAL
BH CV LOWER VASCULAR RIGHT GREATER SAPH BK COMPRESS: NORMAL
BH CV LOWER VASCULAR RIGHT LESSER SAPH COMPRESS: NORMAL
BH CV LOWER VASCULAR RIGHT MID FEMORAL AUGMENT: NORMAL
BH CV LOWER VASCULAR RIGHT MID FEMORAL COMPETENT: NORMAL
BH CV LOWER VASCULAR RIGHT MID FEMORAL COMPRESS: NORMAL
BH CV LOWER VASCULAR RIGHT MID FEMORAL PHASIC: NORMAL
BH CV LOWER VASCULAR RIGHT MID FEMORAL SPONT: NORMAL
BH CV LOWER VASCULAR RIGHT PERONEAL COMPRESS: NORMAL
BH CV LOWER VASCULAR RIGHT POPLITEAL AUGMENT: NORMAL
BH CV LOWER VASCULAR RIGHT POPLITEAL COMPETENT: NORMAL
BH CV LOWER VASCULAR RIGHT POPLITEAL COMPRESS: NORMAL
BH CV LOWER VASCULAR RIGHT POPLITEAL PHASIC: NORMAL
BH CV LOWER VASCULAR RIGHT POPLITEAL SPONT: NORMAL
BH CV LOWER VASCULAR RIGHT POSTERIOR TIBIAL COMPRESS: NORMAL
BH CV LOWER VASCULAR RIGHT PROFUNDA FEMORAL COMPRESS: NORMAL
BH CV LOWER VASCULAR RIGHT PROXIMAL FEMORAL COMPRESS: NORMAL
BH CV LOWER VASCULAR RIGHT SAPHENOFEMORAL JUNCTION COMPRESS: NORMAL
BILIRUB SERPL-MCNC: 0.3 MG/DL (ref 0–1.2)
BLD GP AB SCN SERPL QL: NEGATIVE
BUN SERPL-MCNC: 11 MG/DL (ref 8–23)
BUN/CREAT SERPL: 10.8 (ref 7–25)
CALCIUM SPEC-SCNC: 8.5 MG/DL (ref 8.6–10.5)
CHLORIDE SERPL-SCNC: 106 MMOL/L (ref 98–107)
CO2 SERPL-SCNC: 28.3 MMOL/L (ref 22–29)
CREAT SERPL-MCNC: 1.02 MG/DL (ref 0.76–1.27)
DEPRECATED RDW RBC AUTO: 42.1 FL (ref 37–54)
EOSINOPHIL # BLD AUTO: 0.39 10*3/MM3 (ref 0–0.4)
EOSINOPHIL NFR BLD AUTO: 3.4 % (ref 0.3–6.2)
ERYTHROCYTE [DISTWIDTH] IN BLOOD BY AUTOMATED COUNT: 12.8 % (ref 12.3–15.4)
EXPIRATION DATE: ABNORMAL
FECAL OCCULT BLOOD SCREEN, POC: POSITIVE
GFR SERPL CREATININE-BSD FRML MDRD: 71 ML/MIN/1.73
GLOBULIN UR ELPH-MCNC: 1.7 GM/DL
GLUCOSE SERPL-MCNC: 118 MG/DL (ref 65–99)
HCT VFR BLD AUTO: 34.1 % (ref 37.5–51)
HCT VFR BLD AUTO: 34.9 % (ref 37.5–51)
HCT VFR BLD AUTO: 40.8 % (ref 37.5–51)
HGB BLD-MCNC: 11.6 G/DL (ref 13–17.7)
HGB BLD-MCNC: 12 G/DL (ref 13–17.7)
HGB BLD-MCNC: 13.5 G/DL (ref 13–17.7)
HOLD SPECIMEN: NORMAL
HOLD SPECIMEN: NORMAL
IMM GRANULOCYTES # BLD AUTO: 0.03 10*3/MM3 (ref 0–0.05)
IMM GRANULOCYTES NFR BLD AUTO: 0.3 % (ref 0–0.5)
LYMPHOCYTES # BLD AUTO: 3.04 10*3/MM3 (ref 0.7–3.1)
LYMPHOCYTES NFR BLD AUTO: 26.9 % (ref 19.6–45.3)
Lab: 174
MAXIMAL PREDICTED HEART RATE: 146 BPM
MCH RBC QN AUTO: 29.5 PG (ref 26.6–33)
MCHC RBC AUTO-ENTMCNC: 33.1 G/DL (ref 31.5–35.7)
MCV RBC AUTO: 89.3 FL (ref 79–97)
MONOCYTES # BLD AUTO: 0.59 10*3/MM3 (ref 0.1–0.9)
MONOCYTES NFR BLD AUTO: 5.2 % (ref 5–12)
NEGATIVE CONTROL: NEGATIVE
NEUTROPHILS NFR BLD AUTO: 63.7 % (ref 42.7–76)
NEUTROPHILS NFR BLD AUTO: 7.2 10*3/MM3 (ref 1.7–7)
NRBC BLD AUTO-RTO: 0 /100 WBC (ref 0–0.2)
PLATELET # BLD AUTO: 245 10*3/MM3 (ref 140–450)
PMV BLD AUTO: 10.5 FL (ref 6–12)
POSITIVE CONTROL: POSITIVE
POTASSIUM SERPL-SCNC: 3.4 MMOL/L (ref 3.5–5.2)
PROT SERPL-MCNC: 5.8 G/DL (ref 6–8.5)
RBC # BLD AUTO: 4.57 10*6/MM3 (ref 4.14–5.8)
RH BLD: POSITIVE
SARS-COV-2 ORF1AB RESP QL NAA+PROBE: NOT DETECTED
SODIUM SERPL-SCNC: 142 MMOL/L (ref 136–145)
STRESS TARGET HR: 124 BPM
T&S EXPIRATION DATE: NORMAL
WBC # BLD AUTO: 11.31 10*3/MM3 (ref 3.4–10.8)
WHOLE BLOOD HOLD SPECIMEN: NORMAL
WHOLE BLOOD HOLD SPECIMEN: NORMAL

## 2021-09-02 PROCEDURE — 82270 OCCULT BLOOD FECES: CPT | Performed by: EMERGENCY MEDICINE

## 2021-09-02 PROCEDURE — G0378 HOSPITAL OBSERVATION PER HR: HCPCS

## 2021-09-02 PROCEDURE — 96360 HYDRATION IV INFUSION INIT: CPT

## 2021-09-02 PROCEDURE — 93010 ELECTROCARDIOGRAM REPORT: CPT | Performed by: INTERNAL MEDICINE

## 2021-09-02 PROCEDURE — 99204 OFFICE O/P NEW MOD 45 MIN: CPT | Performed by: INTERNAL MEDICINE

## 2021-09-02 PROCEDURE — 80053 COMPREHEN METABOLIC PANEL: CPT

## 2021-09-02 PROCEDURE — 86900 BLOOD TYPING SEROLOGIC ABO: CPT | Performed by: EMERGENCY MEDICINE

## 2021-09-02 PROCEDURE — 99284 EMERGENCY DEPT VISIT MOD MDM: CPT

## 2021-09-02 PROCEDURE — 93971 EXTREMITY STUDY: CPT

## 2021-09-02 PROCEDURE — 93005 ELECTROCARDIOGRAM TRACING: CPT | Performed by: STUDENT IN AN ORGANIZED HEALTH CARE EDUCATION/TRAINING PROGRAM

## 2021-09-02 PROCEDURE — U0004 COV-19 TEST NON-CDC HGH THRU: HCPCS | Performed by: EMERGENCY MEDICINE

## 2021-09-02 PROCEDURE — 86901 BLOOD TYPING SEROLOGIC RH(D): CPT | Performed by: EMERGENCY MEDICINE

## 2021-09-02 PROCEDURE — 85014 HEMATOCRIT: CPT | Performed by: NURSE PRACTITIONER

## 2021-09-02 PROCEDURE — 85018 HEMOGLOBIN: CPT | Performed by: NURSE PRACTITIONER

## 2021-09-02 PROCEDURE — 85025 COMPLETE CBC W/AUTO DIFF WBC: CPT

## 2021-09-02 PROCEDURE — 96361 HYDRATE IV INFUSION ADD-ON: CPT

## 2021-09-02 PROCEDURE — 86850 RBC ANTIBODY SCREEN: CPT | Performed by: EMERGENCY MEDICINE

## 2021-09-02 PROCEDURE — C9803 HOPD COVID-19 SPEC COLLECT: HCPCS

## 2021-09-02 RX ORDER — ACETAMINOPHEN 160 MG/5ML
650 SOLUTION ORAL EVERY 4 HOURS PRN
Status: DISCONTINUED | OUTPATIENT
Start: 2021-09-02 | End: 2021-09-03 | Stop reason: HOSPADM

## 2021-09-02 RX ORDER — SODIUM CHLORIDE 9 MG/ML
100 INJECTION, SOLUTION INTRAVENOUS CONTINUOUS
Status: DISCONTINUED | OUTPATIENT
Start: 2021-09-02 | End: 2021-09-03

## 2021-09-02 RX ORDER — NITROFURANTOIN 25; 75 MG/1; MG/1
100 CAPSULE ORAL NIGHTLY
Status: DISCONTINUED | OUTPATIENT
Start: 2021-09-02 | End: 2021-09-03 | Stop reason: HOSPADM

## 2021-09-02 RX ORDER — ONDANSETRON 2 MG/ML
4 INJECTION INTRAMUSCULAR; INTRAVENOUS EVERY 6 HOURS PRN
Status: DISCONTINUED | OUTPATIENT
Start: 2021-09-02 | End: 2021-09-03 | Stop reason: HOSPADM

## 2021-09-02 RX ORDER — NITROGLYCERIN 0.4 MG/1
0.4 TABLET SUBLINGUAL
Status: DISCONTINUED | OUTPATIENT
Start: 2021-09-02 | End: 2021-09-03 | Stop reason: HOSPADM

## 2021-09-02 RX ORDER — ONDANSETRON 4 MG/1
4 TABLET, FILM COATED ORAL EVERY 6 HOURS PRN
Status: DISCONTINUED | OUTPATIENT
Start: 2021-09-02 | End: 2021-09-03 | Stop reason: HOSPADM

## 2021-09-02 RX ORDER — SODIUM CHLORIDE 0.9 % (FLUSH) 0.9 %
10 SYRINGE (ML) INJECTION AS NEEDED
Status: DISCONTINUED | OUTPATIENT
Start: 2021-09-02 | End: 2021-09-03 | Stop reason: HOSPADM

## 2021-09-02 RX ORDER — ACETAMINOPHEN 650 MG/1
650 SUPPOSITORY RECTAL EVERY 4 HOURS PRN
Status: DISCONTINUED | OUTPATIENT
Start: 2021-09-02 | End: 2021-09-03 | Stop reason: HOSPADM

## 2021-09-02 RX ORDER — ACETAMINOPHEN 325 MG/1
650 TABLET ORAL EVERY 4 HOURS PRN
Status: DISCONTINUED | OUTPATIENT
Start: 2021-09-02 | End: 2021-09-03 | Stop reason: HOSPADM

## 2021-09-02 RX ORDER — NITROFURANTOIN MACROCRYSTALS 100 MG/1
100 CAPSULE ORAL ONCE
COMMUNITY
End: 2021-09-16

## 2021-09-02 RX ORDER — POTASSIUM CHLORIDE 750 MG/1
40 TABLET, FILM COATED, EXTENDED RELEASE ORAL ONCE
Status: COMPLETED | OUTPATIENT
Start: 2021-09-02 | End: 2021-09-02

## 2021-09-02 RX ADMIN — SODIUM CHLORIDE, POTASSIUM CHLORIDE, SODIUM LACTATE AND CALCIUM CHLORIDE 1000 ML: 600; 310; 30; 20 INJECTION, SOLUTION INTRAVENOUS at 04:40

## 2021-09-02 RX ADMIN — POTASSIUM CHLORIDE 40 MEQ: 750 TABLET, EXTENDED RELEASE ORAL at 10:00

## 2021-09-02 RX ADMIN — SODIUM CHLORIDE 100 ML/HR: 9 INJECTION, SOLUTION INTRAVENOUS at 10:02

## 2021-09-02 NOTE — ED PROVIDER NOTES
EMERGENCY DEPARTMENT ENCOUNTER    Room Number:  12/12  Date of encounter:  9/2/2021  PCP: Antione Ivan MD  Historian: Patient     I used full protective equipment while examining this patient.  This includes face mask, gloves and protective eyewear.  I washed my hands before entering the room and immediately upon leaving the room.  Patient was wearing a surgical mask.      HPI:  Chief Complaint: Rectal bleeding  A complete HPI/ROS/PMH/PSH/SH/FH are unobtainable due to: None    Context: Kendall Her is a 75 y.o. male who presents to the ED c/o rectal bleeding that began approximately 6 to 7 hours ago.  States he has had approximately 10 episodes of rectal bleeding.  Most episodes have not contain any stool.  He reports passing blood clots.  Denies abdominal pain.  Also believes that he may have fainted just prior to arrival.  He remembers having the urge to have a bowel movement.  He stood up, felt dizzy, and walking to the bathroom.  The next thing he remembers, he was waking up on the floor.  Denies preceding headache, chest pain, or palpitations.  Denies any injury.  Patient had a prostate biopsy 1 week ago.  States he initially had some rectal bleeding after that which resolved after few days.  He then started taking his Eliquis again 3 days ago.  Denies fever, vomiting, or abdominal pain.      PAST MEDICAL HISTORY  Active Ambulatory Problems     Diagnosis Date Noted   • Degeneration of intervertebral disc of cervical region 06/28/2016   • Neck pain 06/28/2016   • Osteoporosis 06/28/2016   • Vitamin D deficiency 03/02/2017   • Diet-controlled hyperlipidemia 01/08/2019   • Nocturnal leg cramps 01/08/2019   • Hyperparathyroid bone disease (CMS/HCC) 04/14/2019   • DVT of lower limb, acute (CMS/[facility]) 08/25/2020   • Lumbar disc disease with radiculopathy 08/25/2020   • Acute leg pain, right 07/24/2020   • TSH elevation 01/21/2021   • PSA elevation 01/21/2021     Resolved Ambulatory Problems     Diagnosis Date  Noted   • Osteopenia 08/25/2016   • Calcium blood increased 03/02/2017   • Elevated ALT measurement 03/02/2017     Past Medical History:   Diagnosis Date   • Acid reflux    • Colon cancer screening 01/14/2019   • Parathyroid adenoma    • Shoulder pain, left          PAST SURGICAL HISTORY  Past Surgical History:   Procedure Laterality Date   • PARATHYROIDECTOMY N/A 4/19/2019    Procedure: right superior parathyroid and left superior parathyroid resection.;  Surgeon: Milagro Gordon MD;  Location: [facility] OR;  Service: General   • TONSILLECTOMY     • TUMOR REMOVAL      AS A CHILD UNDER BREAST         FAMILY HISTORY  Family History   Problem Relation Age of Onset   • Alzheimer's disease Mother    • Alzheimer's disease Father    • Malig Hyperthermia Neg Hx          SOCIAL HISTORY  Social History     Socioeconomic History   • Marital status:      Spouse name: Not on file   • Number of children: Not on file   • Years of education: Not on file   • Highest education level: Not on file   Tobacco Use   • Smoking status: Never Smoker   • Smokeless tobacco: Never Used   Substance and Sexual Activity   • Alcohol use: Yes     Alcohol/week: 3.0 - 5.0 standard drinks     Types: 3 - 5 Cans of beer per week     Comment: weekly   • Drug use: No   • Sexual activity: Defer         ALLERGIES  Peanut-containing drug products       REVIEW OF SYSTEMS  Review of Systems      All systems have been reviewed and are negative except as as discussed in the HPI    PHYSICAL EXAM    I have reviewed the triage vital signs and nursing notes.    ED Triage Vitals [09/02/21 0248]   Temp Heart Rate Resp BP SpO2   97 °F (36.1 °C) 100 16 109/69 100 %      Temp src Heart Rate Source Patient Position BP Location FiO2 (%)   Tympanic Monitor Lying Right arm --       Physical Exam  GENERAL: Awake, alert, oriented x3  HENT: NCAT, nares patent, moist mucous membranes  NECK: supple  EYES: Extraocular muscles intact, conjunctiva are normal  bilaterally  CV: regular rhythm, regular rate  RESPIRATORY: normal effort, clear to auscultation bilaterally  ABDOMEN: soft, nontender  RECTAL: There is gross blood present in the rectal vault  MUSCULOSKELETAL: Extremities are nontender and without obvious deformity.  There is normal range of motion in all   NEURO: Strength, sensation, and coordination are grossly intact.  Speech and mentation are unremarkable.    SKIN: warm, dry, no rash  PSYCH: Normal mood and affect      LAB RESULTS  Recent Results (from the past 24 hour(s))   Comprehensive Metabolic Panel    Collection Time: 09/02/21  3:15 AM    Specimen: Blood   Result Value Ref Range    Glucose 118 (H) 65 - 99 mg/dL    BUN 11 8 - 23 mg/dL    Creatinine 1.02 0.76 - 1.27 mg/dL    Sodium 142 136 - 145 mmol/L    Potassium 3.4 (L) 3.5 - 5.2 mmol/L    Chloride 106 98 - 107 mmol/L    CO2 28.3 22.0 - 29.0 mmol/L    Calcium 8.5 (L) 8.6 - 10.5 mg/dL    Total Protein 5.8 (L) 6.0 - 8.5 g/dL    Albumin 4.10 3.50 - 5.20 g/dL    ALT (SGPT) 20 1 - 41 U/L    AST (SGOT) 18 1 - 40 U/L    Alkaline Phosphatase 49 39 - 117 U/L    Total Bilirubin 0.3 0.0 - 1.2 mg/dL    eGFR Non African Amer 71 >60 mL/min/1.73    Globulin 1.7 gm/dL    A/G Ratio 2.4 g/dL    BUN/Creatinine Ratio 10.8 7.0 - 25.0    Anion Gap 7.7 5.0 - 15.0 mmol/L   Green Top (Gel)    Collection Time: 09/02/21  3:15 AM   Result Value Ref Range    Extra Tube Hold for add-ons.    Lavender Top    Collection Time: 09/02/21  3:15 AM   Result Value Ref Range    Extra Tube hold for add-on    Gold Top - SST    Collection Time: 09/02/21  3:15 AM   Result Value Ref Range    Extra Tube Hold for add-ons.    Light Blue Top    Collection Time: 09/02/21  3:15 AM   Result Value Ref Range    Extra Tube hold for add-on    CBC Auto Differential    Collection Time: 09/02/21  3:15 AM    Specimen: Blood   Result Value Ref Range    WBC 11.31 (H) 3.40 - 10.80 10*3/mm3    RBC 4.57 4.14 - 5.80 10*6/mm3    Hemoglobin 13.5 13.0 - 17.7 g/dL     Hematocrit 40.8 37.5 - 51.0 %    MCV 89.3 79.0 - 97.0 fL    MCH 29.5 26.6 - 33.0 pg    MCHC 33.1 31.5 - 35.7 g/dL    RDW 12.8 12.3 - 15.4 %    RDW-SD 42.1 37.0 - 54.0 fl    MPV 10.5 6.0 - 12.0 fL    Platelets 245 140 - 450 10*3/mm3    Neutrophil % 63.7 42.7 - 76.0 %    Lymphocyte % 26.9 19.6 - 45.3 %    Monocyte % 5.2 5.0 - 12.0 %    Eosinophil % 3.4 0.3 - 6.2 %    Basophil % 0.5 0.0 - 1.5 %    Immature Grans % 0.3 0.0 - 0.5 %    Neutrophils, Absolute 7.20 (H) 1.70 - 7.00 10*3/mm3    Lymphocytes, Absolute 3.04 0.70 - 3.10 10*3/mm3    Monocytes, Absolute 0.59 0.10 - 0.90 10*3/mm3    Eosinophils, Absolute 0.39 0.00 - 0.40 10*3/mm3    Basophils, Absolute 0.06 0.00 - 0.20 10*3/mm3    Immature Grans, Absolute 0.03 0.00 - 0.05 10*3/mm3    nRBC 0.0 0.0 - 0.2 /100 WBC   POCT Occult Blood Stool    Collection Time: 09/02/21  4:27 AM    Specimen: Per Rectum; Stool   Result Value Ref Range    Fecal Occult Blood Positive (A) Negative    Lot Number [id number]     Expiration Date 04/30/2022     Positive Control Positive Positive    Negative Control Negative Negative   Type & Screen    Collection Time: 09/02/21  4:39 AM    Specimen: Blood   Result Value Ref Range    ABO Type B     RH type Positive     Antibody Screen Negative     T&S Expiration Date 9/5/2021 11:59:59 PM        Ordered the above labs and independently reviewed the results.      RADIOLOGY  No Radiology Exams Resulted Within Past 24 Hours    I ordered the above noted radiological studies. Reviewed by me and discussed with radiologist.  See dictation for official radiology interpretation.      PROCEDURES  Procedures      MEDICATIONS GIVEN IN ER    Medications   sodium chloride 0.9 % flush 10 mL (has no administration in time range)   sodium chloride 0.9 % flush 10 mL (has no administration in time range)   lactated ringers bolus 1,000 mL (1,000 mL Intravenous New Bag 9/2/21 9650)         PROGRESS, DATA ANALYSIS, CONSULTS, AND MEDICAL DECISION MAKING    All labs have been  independently reviewed by me.  All radiology studies have been reviewed by me and discussed with radiologist dictating the report.   EKG's independently viewed and interpreted by me.  I have reviewed the nurse's notes, vital signs, past medical history, and medication list.  Discussion below represents my analysis of pertinent findings related to patient's condition, differential diagnosis, treatment plan and final disposition.      ED Course as of Sep 02 0658   Thu Sep 02, 2021   0409 Old records reviewed.  Patient was seen in the ED at [facility] in August 2020 for acute right leg DVT.  He was started on Eliquis at that time.    []   0409 WBC(!): 11.31 [WH]   0409 Hemoglobin: 13.5 [WH]   0539 Test results and plan for admission were discussed with the patient and his spouse. He is resting comfortably. Vital signs remained stable.    [WH]   0654 Case discussed with Dr. Carbajal and he agrees to admit the patient to Dr. Muniz.  Pertinent exam findings, test results, ED course, and diagnoses were discussed with him.    []      ED Course User Index  [WH] Cal Lai MD       AS OF 06:58 EDT VITALS:    BP - 100/62  HR - 72  TEMP - 97 °F (36.1 °C) (Tympanic)  O2 SATS - 95%      DIAGNOSIS  Final diagnoses:   Rectal bleeding   Anticoagulated         DISPOSITION  Admission    ADMISSION    Discussed treatment plan and reason for admission with pt/family and admitting physician.  Pt/family voiced understanding of the plan for admission for further testing/treatment as needed.         Dictated utilizing Dragon dictation:  Much of this encounter note is an electronic transcription/translation of spoken language to printed text. The electronic translation of spoken language may permit erroneous, or at times, nonsensical words or phrases to be inadvertently transcribed; Although I have reviewed the note for such errors, some may still exist.     Cal Lai MD  09/02/21 0658

## 2021-09-02 NOTE — ED TRIAGE NOTES
Pt reports 10x episodes of bloody stool this evening. Pt states that he had a prostate biopsy last week and was told to restart his eliquis 3 days ago. Pt reports passing copious amounts of blood during each episode.     Pt placed in mask and staff wearing appropriate ppe at the time of pt arrival.

## 2021-09-02 NOTE — H&P
Patient Name:  Kendall Her  YOB: 1945  MRN:  [id number]  Admit Date:  9/2/2021  Patient Care Team:  Antione Ivan MD as PCP - General (Internal Medicine)      Subjective   History Present Illness     Chief Complaint   Patient presents with   • Rectal Bleeding       Mr. Her is a 75 y.o. male with a history of DVT RLE 8/2020 on anticoagulation, GERD, elevated PSA s/p prostate biopsy, osteopenia/osteoporosis that presents to [facility] complaining of rectal bleeding. He had prostate biopsy last Thursday where he had some post procedure bleeding over the next couple of days that nearly resolved. He restarted his Eliquis on Monday as instructed and by Tuesday started having rectal bleeding again. It worsened by Wednesday night where he reports having 10 episodes of BRBPR w/some clotting and with bowel movements. While going into the bathroom at one point, he experienced a syncopal episode. He has had dizziness/lightheadedness and has felt weak. Denies recent fever, chest pain, shortness of breath, abd pain, n/v. He also reports hematuria since biopsy. Denies dysuria or difficulty urinating. He has never had a colonoscopy but has had Cologard screening.     He was diagnosed with DVT RLE 8/2020 where PCP note states plan was to treat for 3-6 months but repeat doppler after 4 months. Pt reports DVT resolved but there is no records of repeat doppler in [facility]. Unclear why pt would still be on Eliquis. Denies history of prior DVT or clotting disorder.    Afebrile. HR controlled. BP 90s-100s systolic. On room air. WBC 11.31, Hgb 13.5. Gluc 118, K 3.4, Ca 8.5, total protein 5.8. Covid pending.       Review of Systems   Constitutional: Negative for chills and fever.   HENT: Negative for congestion.    Respiratory: Negative for shortness of breath.    Cardiovascular: Negative for chest pain and leg swelling.   Gastrointestinal: Positive for anal bleeding and blood in stool. Negative for  abdominal pain, nausea and vomiting.   Genitourinary: Positive for hematuria. Negative for difficulty urinating and dysuria.   Musculoskeletal: Negative for arthralgias and myalgias.   Skin: Negative for rash.   Neurological: Positive for dizziness, syncope, weakness and light-headedness.   Psychiatric/Behavioral: Negative for confusion and sleep disturbance.        Personal History     Past Medical History:   Diagnosis Date   • Acid reflux    • Colon cancer screening 01/14/2019    Cologuard testing: Negative results   • Neck pain    • Osteopenia     lumbar spine   • Osteoporosis     left femoral neck   • Parathyroid adenoma    • Shoulder pain, left      Past Surgical History:   Procedure Laterality Date   • PARATHYROIDECTOMY N/A 4/19/2019    Procedure: right superior parathyroid and left superior parathyroid resection.;  Surgeon: Milagro Gordon MD;  Location: [facility];  Service: General   • TONSILLECTOMY     • TUMOR REMOVAL      AS A CHILD UNDER BREAST     Family History   Problem Relation Age of Onset   • Alzheimer's disease Mother    • Alzheimer's disease Father    • Malig Hyperthermia Neg Hx      Social History     Tobacco Use   • Smoking status: Never Smoker   • Smokeless tobacco: Never Used   Substance Use Topics   • Alcohol use: Yes     Alcohol/week: 3.0 - 5.0 standard drinks     Types: 3 - 5 Cans of beer per week     Comment: weekly   • Drug use: No     No current facility-administered medications on file prior to encounter.     Current Outpatient Medications on File Prior to Encounter   Medication Sig Dispense Refill   • nitrofurantoin (MACRODANTIN) 100 MG capsule Take 100 mg by mouth 1 (One) Time. Take one 100 mg tablet once daily     • apixaban (ELIQUIS) 5 MG tablet tablet Take 1 tablet by mouth Every 12 (Twelve) Hours. 180 tablet 3   • cholecalciferol (VITAMIN D3) 25 MCG (1000 UT) tablet Take 1,000 Units by mouth Daily.     • Misc Natural Products (RESVERATROL DIET) capsule      • Unable to find 1  each 1 (One) Time. Med Name: Relief Factor.       Allergies   Allergen Reactions   • Peanut-Containing Drug Products Swelling     Patient states after eating some (more than a few) his throat seems to contract       Objective    Objective     Vital Signs  Temp:  [97 °F (36.1 °C)] 97 °F (36.1 °C)  Heart Rate:  [] 74  Resp:  [16] 16  BP: ()/(61-69) 111/69  SpO2:  [95 %-100 %] 97 %  on   ;   Device (Oxygen Therapy): room air  Body mass index is 24.95 kg/m².    Physical Exam  Vitals and nursing note reviewed.   Constitutional:       General: He is not in acute distress.     Appearance: He is ill-appearing.   HENT:      Head: Normocephalic.      Mouth/Throat:      Mouth: Mucous membranes are dry.   Eyes:      Conjunctiva/sclera: Conjunctivae normal.   Cardiovascular:      Rate and Rhythm: Normal rate and regular rhythm.   Pulmonary:      Effort: Pulmonary effort is normal. No respiratory distress.      Breath sounds: Normal breath sounds.   Abdominal:      General: Bowel sounds are normal.      Palpations: Abdomen is soft.      Tenderness: There is no abdominal tenderness.   Musculoskeletal:      Cervical back: Neck supple.      Right lower leg: No edema.      Left lower leg: No edema.   Skin:     General: Skin is warm and dry.   Neurological:      Mental Status: He is alert and oriented to person, place, and time.   Psychiatric:         Mood and Affect: Mood normal.         Behavior: Behavior normal.         Results Review:  I reviewed the patient's new clinical results.  I reviewed the patient's new imaging results and agree with the interpretation.  I reviewed the patient's other test results and agree with the interpretation  I personally viewed and interpreted the patient's EKG/Telemetry data  Discussed with ED provider.    Lab Results (last 24 hours)     Procedure Component Value Units Date/Time    CBC & Differential [[id number]]  (Abnormal) Collected: 09/02/21 0315    Specimen: Blood Updated: 09/02/21  0350    Narrative:      The following orders were created for panel order CBC & Differential.  Procedure                               Abnormality         Status                     ---------                               -----------         ------                     CBC Auto Differential[[id number]]        Abnormal            Final result                 Please view results for these tests on the individual orders.    Comprehensive Metabolic Panel [[id number]]  (Abnormal) Collected: 09/02/21 0315    Specimen: Blood Updated: 09/02/21 0355     Glucose 118 mg/dL      BUN 11 mg/dL      Creatinine 1.02 mg/dL      Sodium 142 mmol/L      Potassium 3.4 mmol/L      Chloride 106 mmol/L      CO2 28.3 mmol/L      Calcium 8.5 mg/dL      Total Protein 5.8 g/dL      Albumin 4.10 g/dL      ALT (SGPT) 20 U/L      AST (SGOT) 18 U/L      Alkaline Phosphatase 49 U/L      Total Bilirubin 0.3 mg/dL      eGFR Non African Amer 71 mL/min/1.73      Globulin 1.7 gm/dL      A/G Ratio 2.4 g/dL      BUN/Creatinine Ratio 10.8     Anion Gap 7.7 mmol/L     Narrative:      GFR Normal >60  Chronic Kidney Disease <60  Kidney Failure <15      CBC Auto Differential [[id number]]  (Abnormal) Collected: 09/02/21 0315    Specimen: Blood Updated: 09/02/21 0350     WBC 11.31 10*3/mm3      RBC 4.57 10*6/mm3      Hemoglobin 13.5 g/dL      Hematocrit 40.8 %      MCV 89.3 fL      MCH 29.5 pg      MCHC 33.1 g/dL      RDW 12.8 %      RDW-SD 42.1 fl      MPV 10.5 fL      Platelets 245 10*3/mm3      Neutrophil % 63.7 %      Lymphocyte % 26.9 %      Monocyte % 5.2 %      Eosinophil % 3.4 %      Basophil % 0.5 %      Immature Grans % 0.3 %      Neutrophils, Absolute 7.20 10*3/mm3      Lymphocytes, Absolute 3.04 10*3/mm3      Monocytes, Absolute 0.59 10*3/mm3      Eosinophils, Absolute 0.39 10*3/mm3      Basophils, Absolute 0.06 10*3/mm3      Immature Grans, Absolute 0.03 10*3/mm3      nRBC 0.0 /100 WBC     POCT Occult Blood Stool [[id number]]  (Abnormal) Collected:  09/02/21 0427    Specimen: Stool from Per Rectum Updated: 09/02/21 0427     Fecal Occult Blood Positive     Lot Number [id number]     Expiration Date 04/30/2022     Positive Control Positive     Negative Control Negative    COVID PRE-OP / PRE-PROCEDURE SCREENING ORDER (NO ISOLATION) - Swab, Nasopharynx [[id number]] Collected: 09/02/21 0655    Specimen: Swab from Nasopharynx Updated: 09/02/21 0840    Narrative:      The following orders were created for panel order COVID PRE-OP / PRE-PROCEDURE SCREENING ORDER (NO ISOLATION) - Swab, Nasopharynx.  Procedure                               Abnormality         Status                     ---------                               -----------         ------                     COVID-19,APTIMA PANTHER(...[[id number]]                      In process                   Please view results for these tests on the individual orders.    COVID-19,APTIMA PANTHER(EVELYN),[facility], NP/OP SWAB IN UTM/VTM/SALINE TRANSPORT MEDIA,24 HR TAT - Swab, Nasopharynx [[id number]] Collected: 09/02/21 0655    Specimen: Swab from Nasopharynx Updated: 09/02/21 0840    Hemoglobin & Hematocrit, Blood [[id number]]  (Abnormal) Collected: 09/02/21 1000    Specimen: Blood Updated: 09/02/21 1008     Hemoglobin 11.6 g/dL      Hematocrit 34.1 %           Imaging Results (Last 24 Hours)     ** No results found for the last 24 hours. **          Results for orders placed in visit on 05/23/94    SCANNED - ECHOCARDIOGRAM      No orders to display        Assessment/Plan     Active Hospital Problems    Diagnosis  POA   • **Rectal bleeding [K62.5]  Yes   • Hematuria [R31.9]  Yes   • Hx of prostate biopsy [Z98.890]  Not Applicable   • Hypokalemia [E87.6]  Yes   • Syncope [R55]  Yes   • History of deep vein thrombosis (DVT) of lower extremity [Z86.718]  Not Applicable   • Anticoagulated [Z79.01]  Not Applicable      Resolved Hospital Problems   No resolved problems to display.       Mr. Her is a 75 y.o. male with a history of DVT RLE  8/2020 on anticoagulation, GERD, elevated PSA s/p prostate biopsy, osteopenia/osteoporosis who is admitted for rectal bleeding, hematuria s/p recent prostate biopsy    -Hgb 13.5 on admission, repeat 11.6. Will monitor serial H&H, transfuse if needed. BP low. Maintenance IVF's. Consult GI. Hold Eliquis  -Consult Urology re: continued hematuria. Continue prescribed nitrofurantoin for prophylaxis following biopsy (3 doses remain)  -Replete K  -Diagnosed with DVT 8/2020. Has remained on Eliquis w/no prior DVT or clotting disorder. No record of repeat doppler; will order to determine if long term anticoagulation is needed  -Syncopal episode x 1 at home likely due to bleeding/vasovagal response. Will workup further if indicated  -Monitor BP    I discussed the patient's findings and my recommendations with patient, family, nursing staff and Dr. Muniz.    VTE Prophylaxis - SCDs.  Code Status - Full code.       FREDY Cassidy  [facility]  09/02/21  11:56 EDT

## 2021-09-02 NOTE — CONSULTS
FIRST UROLOGY CONSULT      Patient Identification:  NAME:  eKndall Her  Age:  75 y.o.   Sex:  male   :  1945   MRN:  [id number]       Chief complaint: Blood per rectum and blood per Urethra    History of present illness: 75-year-old gentleman who recently underwent a transrectal sound prostate biopsy for elevated PSA.  He had been on Eliquis which had been stopped prior and then restarted several days later.  He has been having bright bloody urine throughout the week and then yesterday evening had some significant dark bloody bowel movements.  He had a syncopal episode late last night and passed out in the bathroom but did not hit his head.  Been admitted for observation.  He is not having trouble voiding outside of that.      Past medical history:  Past Medical History:   Diagnosis Date   • Acid reflux    • Colon cancer screening 2019    Cologuard testing: Negative results   • DVT (deep venous thrombosis) (CMS/[facility])    • Neck pain    • Osteopenia     lumbar spine   • Osteoporosis     left femoral neck   • Parathyroid adenoma    • Shoulder pain, left        Past surgical history:  Past Surgical History:   Procedure Laterality Date   • PARATHYROIDECTOMY N/A 2019    Procedure: right superior parathyroid and left superior parathyroid resection.;  Surgeon: Milagro Gordon MD;  Location: [facility];  Service: General   • TONSILLECTOMY     • TUMOR REMOVAL      AS A CHILD UNDER BREAST       Allergies:  Peanut-containing drug products    Home medications:  (Not in a hospital admission)       Hospital medications:  nitrofurantoin (macrocrystal-monohydrate), 100 mg, Oral, Nightly      sodium chloride, 100 mL/hr, Last Rate: 100 mL/hr (21 2926)      •  acetaminophen **OR** acetaminophen **OR** acetaminophen  •  nitroglycerin  •  ondansetron **OR** ondansetron  •  sodium chloride  •  [COMPLETED] Insert peripheral IV **AND** sodium chloride    Family history:  Family History   Problem  Relation Age of Onset   • Alzheimer's disease Mother    • Alzheimer's disease Father    • Malig Hyperthermia Neg Hx        Social history:  Social History     Tobacco Use   • Smoking status: Never Smoker   • Smokeless tobacco: Never Used   Vaping Use   • Vaping Use: Never assessed   Substance Use Topics   • Alcohol use: Yes     Alcohol/week: 5.0 standard drinks     Types: 5 Cans of beer per week     Comment: weekly   • Drug use: No       Review of systems:    Negative 12-system ROS except for the following: As above.      Objective:  TMax 24 hours:   Temp (24hrs), Av °F (36.1 °C), Min:97 °F (36.1 °C), Max:97 °F (36.1 °C)      Vitals Ranges:   Temp:  [97 °F (36.1 °C)] 97 °F (36.1 °C)  Heart Rate:  [] 75  Resp:  [16-18] 18  BP: ()/(61-72) 117/72    Intake/Output Last 3 shifts:  No intake/output data recorded.     Physical Exam:       General Appearance:    Alert, cooperative, in no acute distress   Head:    Normocephalic, without obvious abnormality, atraumatic   Eyes:          PERRL, conjunctivae and corneas clear   Ears:    Normal external inspection   Throat:   No oral lesions, oral mucosa moist   Neck:   Supple, no LAD, trachea midline   Back:     No CVA tenderness   Lungs:     Respirations unlabored, symmetric excursion    Heart:    RRR, intact peripheral pulses   Abdomen:    Soft benign no bladder distention   :   Penis normal testes normal scrotum normal   NORMAN:  Extremities:  Deferred.  No edema, no deformity   Skin:   No bleeding, bruising or rashes   Neuro/Psych:   Orientation intact, mood/affect pleasant, no focal findings       Results review:   I reviewed the patient's new clinical results.    Data review:  Lab Results (last 24 hours)     Procedure Component Value Units Date/Time    COVID PRE-OP / PRE-PROCEDURE SCREENING ORDER (NO ISOLATION) - Swab, Nasopharynx [[id number]]  (Normal) Collected: 21    Specimen: Swab from Nasopharynx Updated: 21 1616    Narrative:      The  following orders were created for panel order COVID PRE-OP / PRE-PROCEDURE SCREENING ORDER (NO ISOLATION) - Swab, Nasopharynx.  Procedure                               Abnormality         Status                     ---------                               -----------         ------                     COVID-19,APTIMA PANTHER(...[[id number]]  Normal              Final result                 Please view results for these tests on the individual orders.    COVID-19,APTIMA PANTHER(EVELYN), [place], NP/OP SWAB IN UTM/VTM/SALINE TRANSPORT MEDIA,24 HR TAT - Swab, Nasopharynx [[id number]]  (Normal) Collected: 09/02/21 0655    Specimen: Swab from Nasopharynx Updated: 09/02/21 1616     COVID19 Not Detected    Narrative:      Fact sheet for providers: https://www.fda.gov/media/085283/download     Fact sheet for patients: https://www.fda.gov/media/071882/download    Test performed by RT PCR.    Hemoglobin & Hematocrit, Blood [[id number]]  (Abnormal) Collected: 09/02/21 1527    Specimen: Blood Updated: 09/02/21 1557     Hemoglobin 12.0 g/dL      Hematocrit 34.9 %     Hemoglobin & Hematocrit, Blood [[id number]]  (Abnormal) Collected: 09/02/21 1000    Specimen: Blood Updated: 09/02/21 1008     Hemoglobin 11.6 g/dL      Hematocrit 34.1 %     POCT Occult Blood Stool [[id number]]  (Abnormal) Collected: 09/02/21 0427    Specimen: Stool from Per Rectum Updated: 09/02/21 0427     Fecal Occult Blood Positive     Lot Number [id number]     Expiration Date 04/30/2022     Positive Control Positive     Negative Control Negative    [place] Draw [[id number]] Collected: 09/02/21 0315    Specimen: Blood Updated: 09/02/21 0415    Narrative:      The following orders were created for panel order [place] Draw.  Procedure                               Abnormality         Status                     ---------                               -----------         ------                     Green Top (Gel)[[id number]]                                  Final result                Lavender Top[[id number]]                                     Final result               Gold Top - SST[[id number]]                                   Final result               Light Blue Top[[id number]]                                   Final result                 Please view results for these tests on the individual orders.    Green Top (Gel) [[id number]] Collected: 09/02/21 0315    Specimen: Blood Updated: 09/02/21 0415     Extra Tube Hold for add-ons.     Comment: Auto resulted.       Lavender Top [[id number]] Collected: 09/02/21 0315    Specimen: Blood Updated: 09/02/21 0415     Extra Tube hold for add-on     Comment: Auto resulted       Gold Top - SST [[id number]] Collected: 09/02/21 0315    Specimen: Blood Updated: 09/02/21 0415     Extra Tube Hold for add-ons.     Comment: Auto resulted.       Light Blue Top [[id number]] Collected: 09/02/21 0315    Specimen: Blood Updated: 09/02/21 0415     Extra Tube hold for add-on     Comment: Auto resulted       Comprehensive Metabolic Panel [[id number]]  (Abnormal) Collected: 09/02/21 0315    Specimen: Blood Updated: 09/02/21 0355     Glucose 118 mg/dL      BUN 11 mg/dL      Creatinine 1.02 mg/dL      Sodium 142 mmol/L      Potassium 3.4 mmol/L      Chloride 106 mmol/L      CO2 28.3 mmol/L      Calcium 8.5 mg/dL      Total Protein 5.8 g/dL      Albumin 4.10 g/dL      ALT (SGPT) 20 U/L      AST (SGOT) 18 U/L      Alkaline Phosphatase 49 U/L      Total Bilirubin 0.3 mg/dL      eGFR Non African Amer 71 mL/min/1.73      Globulin 1.7 gm/dL      A/G Ratio 2.4 g/dL      BUN/Creatinine Ratio 10.8     Anion Gap 7.7 mmol/L     Narrative:      GFR Normal >60  Chronic Kidney Disease <60  Kidney Failure <15      CBC & Differential [[id number]]  (Abnormal) Collected: 09/02/21 0315    Specimen: Blood Updated: 09/02/21 0350    Narrative:      The following orders were created for panel order CBC & Differential.  Procedure                               Abnormality         Status                      ---------                               -----------         ------                     CBC Auto Differential[[id number]]        Abnormal            Final result                 Please view results for these tests on the individual orders.    CBC Auto Differential [[id number]]  (Abnormal) Collected: 09/02/21 0315    Specimen: Blood Updated: 09/02/21 0350     WBC 11.31 10*3/mm3      RBC 4.57 10*6/mm3      Hemoglobin 13.5 g/dL      Hematocrit 40.8 %      MCV 89.3 fL      MCH 29.5 pg      MCHC 33.1 g/dL      RDW 12.8 %      RDW-SD 42.1 fl      MPV 10.5 fL      Platelets 245 10*3/mm3      Neutrophil % 63.7 %      Lymphocyte % 26.9 %      Monocyte % 5.2 %      Eosinophil % 3.4 %      Basophil % 0.5 %      Immature Grans % 0.3 %      Neutrophils, Absolute 7.20 10*3/mm3      Lymphocytes, Absolute 3.04 10*3/mm3      Monocytes, Absolute 0.59 10*3/mm3      Eosinophils, Absolute 0.39 10*3/mm3      Basophils, Absolute 0.06 10*3/mm3      Immature Grans, Absolute 0.03 10*3/mm3      nRBC 0.0 /100 WBC            Imaging:  Imaging Results (Last 24 Hours)     ** No results found for the last 24 hours. **             Assessment:       Rectal bleeding    Hematuria    Hx of prostate biopsy    Hypokalemia    Syncope    History of deep vein thrombosis (DVT) of lower extremity    Anticoagulated    Postoperative rectal and prostatic bleeding after transrectal ultrasound of the prostate with biopsies    Plan:     Conservative management as there is very little we can do to stop this bleeding but it should cease now that is off his Eliquis.  He can stay off this for some time.  No plans for catheter.  Continue with serial hematocrit levels and bedrest with observation.  If he is stable tomorrow with no further bleeding I have no issues with him being discharged.    George Sinclair MD  09/02/21  18:57 EDT

## 2021-09-02 NOTE — PROGRESS NOTES
Vascular lab preliminary    Right lower extremity venous duplex exam is complete    Right leg is negative for DVT/SVT      Spoke with the RN in the ER at 2:07pm    Final report to follow

## 2021-09-02 NOTE — CONSULTS
[facility]  Initial Inpatient Consult Note    Referring Provider: Dr Fry    Reason for Consultation: Rectal bleeding    Subjective     History of present illness:    75 y.o. male presenting with rectal bleeding.  Symptoms off/on since he had prostate biopsy last Thursday with urology.  Symptoms had mostly subsided by early this week but began again early this am after restarting his Eliquis on Monday.  Has had some pre-syncopal episodes with lightheadedness and dizziness.  He has also been having hematuria.  Hb 13.5 -> 11.6 from 033 to 1000.  No prior colonoscopy but negative cologuard in 2019.        Past Medical History:  Past Medical History:   Diagnosis Date   • Acid reflux    • Colon cancer screening 01/14/2019    Cologuard testing: Negative results   • DVT (deep venous thrombosis) (CMS/HCC)    • Neck pain    • Osteopenia     lumbar spine   • Osteoporosis     left femoral neck   • Parathyroid adenoma    • Shoulder pain, left      Past Surgical History:  Past Surgical History:   Procedure Laterality Date   • PARATHYROIDECTOMY N/A 4/19/2019    Procedure: right superior parathyroid and left superior parathyroid resection.;  Surgeon: Milagro Gordon MD;  Location: [facility];  Service: General   • TONSILLECTOMY     • TUMOR REMOVAL      AS A CHILD UNDER BREAST      Social History:   Social History     Tobacco Use   • Smoking status: Never Smoker   • Smokeless tobacco: Never Used   Substance Use Topics   • Alcohol use: Yes     Alcohol/week: 5.0 standard drinks     Types: 5 Cans of beer per week     Comment: weekly      Family History:  Family History   Problem Relation Age of Onset   • Alzheimer's disease Mother    • Alzheimer's disease Father    • Malig Hyperthermia Neg Hx        Home Meds:  (Not in a hospital admission)    Current Meds:   nitrofurantoin (macrocrystal-monohydrate), 100 mg, Oral, Nightly      Allergies:  Allergies   Allergen Reactions   • Peanut-Containing Drug  Products Swelling     Patient states after eating some (more than a few) his throat seems to contract     Review of Systems  All systems were reviewed and negative except for:  Gastrointestinal: positive for  bright red blood per rectum  Genitourinary: postivie for  blood in urine     Objective     Vital Signs  Temp:  [97 °F (36.1 °C)] 97 °F (36.1 °C)  Heart Rate:  [] 75  Resp:  [16-18] 18  BP: ()/(61-72) 117/72  Physical Exam:  General Appearance:     Alert, cooperative, in no acute distress   Head:    Normocephalic, without obvious abnormality, atraumatic   Eyes:            Lids and lashes normal, conjunctivae and sclerae normal, no icterus   Throat:   No oral lesions, no thrush, oral mucosa moist   Neck:   No adenopathy, supple, trachea midline, no thyromegaly, no carotid bruit, no JVD   Lungs:     Clear to auscultation,respirations regular, even and            unlabored    Heart:    Regular rhythm and normal rate, normal S1 and S2, no        murmur, no gallop, no rub, no click   Chest Wall:    No abnormalities observed   Abdomen:     Normal bowel sounds, no masses, no organomegaly, soft     nontender, nondistended, no guarding, no rebound                 tenderness   Rectal:     Deferred   Extremities:   no edema, no cyanosis, no redness   Skin:   No bleeding, bruising or rash   Lymph nodes:   No palpable adenopathy   Psychiatric:  Judgement and insight: normal   Orientation to person place and time: normal   Mood and affect: normal   Results Review:   I reviewed the patient's new clinical results.    Results from last 7 days   Lab Units 09/02/21  1000 09/02/21  0315   WBC 10*3/mm3  --  11.31*   HEMOGLOBIN g/dL 11.6* 13.5   HEMATOCRIT % 34.1* 40.8   PLATELETS 10*3/mm3  --  245     Results from last 7 days   Lab Units 09/02/21  0315   SODIUM mmol/L 142   POTASSIUM mmol/L 3.4*   CHLORIDE mmol/L 106   CO2 mmol/L 28.3   BUN mg/dL 11   CREATININE mg/dL 1.02   CALCIUM mg/dL 8.5*   BILIRUBIN mg/dL 0.3   ALK  PHOS U/L 49   ALT (SGPT) U/L 20   AST (SGOT) U/L 18   GLUCOSE mg/dL 118*         No results found for: LIPASE    Radiology:  No orders to display       Assessment/Plan   Assessment:   1.  Rectal bleeding  2.  Hematuria  3.  S/P recent prostate biopsy  4.  Hx of DVT on NOAC    Plan:   Post op bleeding from transrectal prostate biopsy likely complicated by concurrent NOAC use.  Recommend urology consultation for non-invasive management of bleeding, I will follow along and will be available to offer endoscopic intervention if necessary.  Patient is currently hemodynamically stable with no bleeding since earlier this am.  Continue to trend H/H.  Continue to hold Eliquis.       I discussed the patients findings and my recommendations with patient and family.         Axel Sharma M.D.  [facility]  [address] [place] [address]  Office: [phone]

## 2021-09-03 ENCOUNTER — READMISSION MANAGEMENT (OUTPATIENT)
Dept: CALL CENTER | Facility: HOSPITAL | Age: 76
End: 2021-09-03

## 2021-09-03 VITALS
HEIGHT: 72 IN | BODY MASS INDEX: 25.48 KG/M2 | SYSTOLIC BLOOD PRESSURE: 106 MMHG | TEMPERATURE: 97.8 F | RESPIRATION RATE: 18 BRPM | OXYGEN SATURATION: 94 % | WEIGHT: 188.1 LBS | HEART RATE: 71 BPM | DIASTOLIC BLOOD PRESSURE: 61 MMHG

## 2021-09-03 LAB
ANION GAP SERPL CALCULATED.3IONS-SCNC: 7.2 MMOL/L (ref 5–15)
BUN SERPL-MCNC: 7 MG/DL (ref 8–23)
BUN/CREAT SERPL: 9.2 (ref 7–25)
CALCIUM SPEC-SCNC: 8.3 MG/DL (ref 8.6–10.5)
CHLORIDE SERPL-SCNC: 109 MMOL/L (ref 98–107)
CO2 SERPL-SCNC: 26.8 MMOL/L (ref 22–29)
CREAT SERPL-MCNC: 0.76 MG/DL (ref 0.76–1.27)
DEPRECATED RDW RBC AUTO: 42.2 FL (ref 37–54)
ERYTHROCYTE [DISTWIDTH] IN BLOOD BY AUTOMATED COUNT: 13 % (ref 12.3–15.4)
GFR SERPL CREATININE-BSD FRML MDRD: 100 ML/MIN/1.73
GLUCOSE SERPL-MCNC: 83 MG/DL (ref 65–99)
HCT VFR BLD AUTO: 34.6 % (ref 37.5–51)
HCT VFR BLD AUTO: 34.6 % (ref 37.5–51)
HGB BLD-MCNC: 11.7 G/DL (ref 13–17.7)
HGB BLD-MCNC: 11.7 G/DL (ref 13–17.7)
MCH RBC QN AUTO: 29.8 PG (ref 26.6–33)
MCHC RBC AUTO-ENTMCNC: 33.8 G/DL (ref 31.5–35.7)
MCV RBC AUTO: 88.3 FL (ref 79–97)
PLATELET # BLD AUTO: 189 10*3/MM3 (ref 140–450)
PMV BLD AUTO: 10.3 FL (ref 6–12)
POTASSIUM SERPL-SCNC: 4.1 MMOL/L (ref 3.5–5.2)
QT INTERVAL: 385 MS
RBC # BLD AUTO: 3.92 10*6/MM3 (ref 4.14–5.8)
SODIUM SERPL-SCNC: 143 MMOL/L (ref 136–145)
WBC # BLD AUTO: 6.06 10*3/MM3 (ref 3.4–10.8)

## 2021-09-03 PROCEDURE — 36415 COLL VENOUS BLD VENIPUNCTURE: CPT | Performed by: NURSE PRACTITIONER

## 2021-09-03 PROCEDURE — 96361 HYDRATE IV INFUSION ADD-ON: CPT

## 2021-09-03 PROCEDURE — 85027 COMPLETE CBC AUTOMATED: CPT | Performed by: NURSE PRACTITIONER

## 2021-09-03 PROCEDURE — 85018 HEMOGLOBIN: CPT | Performed by: NURSE PRACTITIONER

## 2021-09-03 PROCEDURE — 85014 HEMATOCRIT: CPT | Performed by: NURSE PRACTITIONER

## 2021-09-03 PROCEDURE — 80048 BASIC METABOLIC PNL TOTAL CA: CPT | Performed by: NURSE PRACTITIONER

## 2021-09-03 PROCEDURE — 99213 OFFICE O/P EST LOW 20 MIN: CPT | Performed by: INTERNAL MEDICINE

## 2021-09-03 PROCEDURE — G0378 HOSPITAL OBSERVATION PER HR: HCPCS

## 2021-09-03 NOTE — PROGRESS NOTES
[facility]  Inpatient Progress Note    Reason for Follow Up:  Rectal bleeding    Subjective     Interval History:   No bleeding overnight    Current Facility-Administered Medications:   •  acetaminophen (TYLENOL) tablet 650 mg, 650 mg, Oral, Q4H PRN **OR** acetaminophen (TYLENOL) 160 MG/5ML solution 650 mg, 650 mg, Oral, Q4H PRN **OR** acetaminophen (TYLENOL) suppository 650 mg, 650 mg, Rectal, Q4H PRN, Daria Fry APRN  •  nitrofurantoin (macrocrystal-monohydrate) (MACROBID) capsule 100 mg, 100 mg, Oral, Nightly, Daria Fry APRN  •  nitroglycerin (NITROSTAT) SL tablet 0.4 mg, 0.4 mg, Sublingual, Q5 Min PRN, Daria Fry APRN  •  ondansetron (ZOFRAN) tablet 4 mg, 4 mg, Oral, Q6H PRN **OR** ondansetron (ZOFRAN) injection 4 mg, 4 mg, Intravenous, Q6H PRN, Daria Fry APRN  •  sodium chloride 0.9 % flush 10 mL, 10 mL, Intravenous, PRN, Cal Lai MD  •  [COMPLETED] Insert peripheral IV, , , Once **AND** sodium chloride 0.9 % flush 10 mL, 10 mL, Intravenous, PRN, Cal Lai MD  •  sodium chloride 0.9 % infusion, 100 mL/hr, Intravenous, Continuous, Daria Fry APRN, Last Rate: 100 mL/hr at 09/03/21 0648, 100 mL/hr at 09/03/21 0648    Current Outpatient Medications:   •  apixaban (ELIQUIS) 5 MG tablet tablet, Take 1 tablet by mouth Every 12 (Twelve) Hours., Disp: 180 tablet, Rfl: 3  •  cholecalciferol (VITAMIN D3) 25 MCG (1000 UT) tablet, Take 1,000 Units by mouth Daily., Disp: , Rfl:   •  Misc Natural Products (RESVERATROL DIET) capsule, , Disp: , Rfl:   •  nitrofurantoin (MACRODANTIN) 100 MG capsule, Take 100 mg by mouth 1 (One) Time. Take one 100 mg tablet once daily, Disp: , Rfl:   •  Unable to find, 1 each 1 (One) Time. Med Name: Relief Factor., Disp: , Rfl:   Review of Systems:    The following systems were reviewed and negative;  gastrointestinal    Objective     Vital Signs  Temp:  [97.8 °F (36.6 °C)-98 °F (36.7 °C)]  97.8 °F (36.6 °C)  Heart Rate:  [73-75] 74  Resp:  [16-18] 18  BP: ()/(60-72) 110/60  Body mass index is 25.51 kg/m².    Intake/Output Summary (Last 24 hours) at 9/3/2021 0747  Last data filed at 9/2/2021 2223  Gross per 24 hour   Intake 210 ml   Output --   Net 210 ml     No intake/output data recorded.     Physical Exam:   General: patient awake, alert and cooperative   Eyes: Normal lids and lashes, no scleral icterus   Neck: supple, normal ROM   Skin: warm and dry, not jaundiced   Cardiovascular: regular rhythm and rate, no murmurs auscultated   Pulm: clear to auscultation bilaterally, regular and unlabored   Abdomen: soft, nontender, nondistended; normal bowel sounds   Rectal: deferred   Extremities: no rash or edema   Psychiatric: Normal mood and behavior; memory intact     Results Review:     I reviewed the patient's new clinical results.    Results from last 7 days   Lab Units 09/03/21  0624 09/02/21  1527 09/02/21  1000 09/02/21  0315 09/02/21  0315   WBC 10*3/mm3 6.06  --   --   --  11.31*   HEMOGLOBIN g/dL 11.7*  11.7* 12.0* 11.6*   < > 13.5   HEMATOCRIT % 34.6*  34.6* 34.9* 34.1*   < > 40.8   PLATELETS 10*3/mm3 189  --   --   --  245    < > = values in this interval not displayed.     Results from last 7 days   Lab Units 09/03/21  0624 09/02/21  0315   SODIUM mmol/L 143 142   POTASSIUM mmol/L 4.1 3.4*   CHLORIDE mmol/L 109* 106   CO2 mmol/L 26.8 28.3   BUN mg/dL 7* 11   CREATININE mg/dL 0.76 1.02   CALCIUM mg/dL 8.3* 8.5*   BILIRUBIN mg/dL  --  0.3   ALK PHOS U/L  --  49   ALT (SGPT) U/L  --  20   AST (SGOT) U/L  --  18   GLUCOSE mg/dL 83 118*         No results found for: LIPASE    Radiology:  [unfilled]      Assessment/Plan   Assessment:   1.  Rectal bleeding  2.  Hematuria  3.  S/P transrectal prostate biopsy  4.  Hx of DVT on NOAC, currently held    Plan:   Hb stable, no further bleeding reported.  He will remain off NOAC for now.  No plans for endoscopic evaluation at this time but GI will  be available as needed.  Patient can f/u with me in office to discuss possible elective outpt colonoscopy.    I discussed the patients findings and my recommendations with patient.         Axel Sharma M.D.  [facility]  [address] [place] [address]  Office: [phone]

## 2021-09-03 NOTE — DISCHARGE SUMMARY
Date of Admission: 9/2/2021  Date of Discharge:  9/3/2021  Primary Care Physician: Antione Ivan MD     Discharge Diagnosis:  Active Hospital Problems    Diagnosis  POA   • **Rectal bleeding [K62.5]  Yes   • Hematuria [R31.9]  Yes   • Hx of prostate biopsy [Z98.890]  Not Applicable   • Hypokalemia [E87.6]  Yes   • Syncope [R55]  Yes   • History of deep vein thrombosis (DVT) of lower extremity [Z86.718]  Not Applicable   • Anticoagulated [Z79.01]  Not Applicable      Resolved Hospital Problems   No resolved problems to display.       DETAILS OF HOSPITAL STAY     Pertinent Test Results and Procedures Performed    Right lower extremity Doppler negative for DVT    Hospital Course  This is a 75-year-old male with past medical history significant for right lower extremity DVT in August of last year who was on Eliquis as an outpatient.  He had a recent transrectal prostate biopsy.  He came in with rectal bleeding leading to orthostasis and a syncopal episode.  Please see H&P for full details of admission.  His anticoagulant was stopped.  Doppler of the right leg is negative for DVT and at least temporarily he will remain off of Eliquis.  He was evaluated by GI and urology.  No intervention was needed.  His hemoglobin is stable at 11.7.  Today he feels back to baseline.  He is not having any dizziness lightheadedness with standing or ambulation.  He is medically stable.  He will be discharged home and follow-up with his primary care, GI, and urology as an outpatient.    Physical Exam at Discharge:  General: No acute distress, AAOx3  HEENT: EOMI, PERRL  Cardiovascular: +s1 and s2, RRR  Lungs: No rhonchi or wheezing  Abdomen: soft, nontender    Consults:   Consults     Date and Time Order Name Status Description    9/2/2021 10:54 AM Inpatient Urology Consult Completed     9/2/2021  8:56 AM Inpatient Gastroenterology Consult Completed     9/2/2021  6:52 AM [facility] (on-call MD unless specified) Details Completed              Condition on Discharge: Stable, improved    Discharge Disposition  Home or Self Care    Discharge Medications     Discharge Medications      Continue These Medications      Instructions Start Date   cholecalciferol 25 MCG (1000 UT) tablet  Commonly known as: VITAMIN D3   1,000 Units, Oral, Daily      nitrofurantoin 100 MG capsule  Commonly known as: MACRODANTIN   100 mg, Oral, Once, Take one 100 mg tablet once daily       Resveratrol Diet capsule   No dose, route, or frequency recorded.      Unable to find   1 each, Once, Med Name: Relief Factor.          Stop These Medications    apixaban 5 MG tablet tablet  Commonly known as: ELIQUIS            Discharge Diet:   Diet Instructions     Diet: Regular      Discharge Diet: Regular          Activity at Discharge:   Activity Instructions     Activity as Tolerated            Follow-up Appointments  Future Appointments   Date Time Provider Department Center   1/20/2022  9:30 AM [facility]   1/27/2022 10:45 AM Antione Ivan MD [facility]     Additional Instructions for the Follow-ups that You Need to Schedule     Discharge Follow-up with PCP   As directed       Currently Documented PCP:    Antione Ivan MD    PCP Phone Number:    [phone]     Follow Up Details: 1 week         Discharge Follow-up with Specialty: Urology as per their recommendations   As directed      Specialty: Urology as per their recommendations               I have examined and discussed discharge planning with the patient today.    I wore full protective equipment throughout the patient encounter including eye protection and facemask.  Hand hygiene was performed before donning protective equipment and after removal when leaving the room.     Valdemar Rucker MD  09/03/21  12:00 EDT    Time: Discharge 22 min

## 2021-09-03 NOTE — OUTREACH NOTE
Prep Survey      Responses   [facility] patient discharged from?  [facility]   Is LACE score < 7 ?  Yes   Emergency Room discharge w/ pulse ox?  No   Eligibility  [facility]   Date of Admission  09/02/21   Date of Discharge  09/03/21   Discharge Disposition  Home or Self Care   Discharge diagnosis  Rectal bleeding   Does the patient have one of the following disease processes/diagnoses(primary or secondary)?  Other   Does the patient have Home health ordered?  No   Is there a DME ordered?  No   Prep survey completed?  Yes          Deneen Topete RN

## 2021-09-03 NOTE — PLAN OF CARE
Goal Outcome Evaluation:  Plan of Care Reviewed With: patient        Progress: improving  Outcome Summary: Rested well in bed overnight. No blood in urine or stool. Denies pain. Ambulating well. Hopes to d/c home today. Continue to monitor.

## 2021-09-06 ENCOUNTER — TRANSITIONAL CARE MANAGEMENT TELEPHONE ENCOUNTER (OUTPATIENT)
Dept: CALL CENTER | Facility: HOSPITAL | Age: 76
End: 2021-09-06

## 2021-09-06 NOTE — OUTREACH NOTE
Call Center TCM Note      Responses   [facility] patient discharged from?  [place]   Does the patient have one of the following disease processes/diagnoses(primary or secondary)?  Other   TCM attempt successful?  Yes   Call start time  1635   Call end time  1639   Discharge diagnosis  Rectal bleeding   Is patient permission given to speak with other caregiver?  No   Meds reviewed with patient/caregiver?  Yes   Does the patient have all medications ordered at discharge?  N/A [No new meds ordered at discharge. Eliquis stopped. ]   Is the patient taking all medications as directed (includes completed medication regime)?  Yes   Does the patient have a primary care provider?   Yes   Does the patient have an appointment with their PCP within 7 days of discharge?  Yes   Comments regarding PCP  PCP Dr Antione Ivan. Hospital follow up scheduled for Wed 9/8/21  11am   Has the patient kept scheduled appointments due by today?  N/A   Has home health visited the patient within 72 hours of discharge?  N/A   Psychosocial issues?  No   Did the patient receive a copy of their discharge instructions?  Yes   Nursing interventions  Reviewed instructions with patient   What is the patient's perception of their health status since discharge?  Improving   Is the patient/caregiver able to teach back signs and symptoms related to disease process for when to call PCP?  Yes   Is the patient/caregiver able to teach back signs and symptoms related to disease process for when to call 911?  Yes   Is the patient/caregiver able to teach back the hierarchy of who to call/visit for symptoms/problems? PCP, Specialist, Home health nurse, Urgent Care, ED, 911  Yes   If the patient is a current smoker, are they able to teach back resources for cessation?  Not a smoker   TCM call completed?  Yes          Marylou Rodrigez RN    9/6/2021, 16:39 EDT

## 2021-09-08 ENCOUNTER — OFFICE VISIT (OUTPATIENT)
Dept: FAMILY MEDICINE CLINIC | Facility: CLINIC | Age: 76
End: 2021-09-08

## 2021-09-08 VITALS
WEIGHT: 180.6 LBS | RESPIRATION RATE: 8 BRPM | SYSTOLIC BLOOD PRESSURE: 102 MMHG | TEMPERATURE: 97.3 F | HEART RATE: 73 BPM | HEIGHT: 72 IN | BODY MASS INDEX: 24.46 KG/M2 | DIASTOLIC BLOOD PRESSURE: 60 MMHG | OXYGEN SATURATION: 96 %

## 2021-09-08 DIAGNOSIS — Z86.718 HISTORY OF DEEP VEIN THROMBOSIS (DVT) OF LOWER EXTREMITY: ICD-10-CM

## 2021-09-08 DIAGNOSIS — K62.5 RECTAL BLEEDING: Primary | ICD-10-CM

## 2021-09-08 LAB
BASOPHILS # BLD AUTO: 0.08 10*3/MM3 (ref 0–0.2)
BASOPHILS NFR BLD AUTO: 1.1 % (ref 0–1.5)
EOSINOPHIL # BLD AUTO: 0.33 10*3/MM3 (ref 0–0.4)
EOSINOPHIL NFR BLD AUTO: 4.5 % (ref 0.3–6.2)
ERYTHROCYTE [DISTWIDTH] IN BLOOD BY AUTOMATED COUNT: 12.8 % (ref 12.3–15.4)
HCT VFR BLD AUTO: 37.1 % (ref 37.5–51)
HGB BLD-MCNC: 12.6 G/DL (ref 13–17.7)
IMM GRANULOCYTES # BLD AUTO: 0.02 10*3/MM3 (ref 0–0.05)
IMM GRANULOCYTES NFR BLD AUTO: 0.3 % (ref 0–0.5)
LYMPHOCYTES # BLD AUTO: 1.69 10*3/MM3 (ref 0.7–3.1)
LYMPHOCYTES NFR BLD AUTO: 23 % (ref 19.6–45.3)
MCH RBC QN AUTO: 29.4 PG (ref 26.6–33)
MCHC RBC AUTO-ENTMCNC: 34 G/DL (ref 31.5–35.7)
MCV RBC AUTO: 86.5 FL (ref 79–97)
MONOCYTES # BLD AUTO: 0.55 10*3/MM3 (ref 0.1–0.9)
MONOCYTES NFR BLD AUTO: 7.5 % (ref 5–12)
NEUTROPHILS # BLD AUTO: 4.68 10*3/MM3 (ref 1.7–7)
NEUTROPHILS NFR BLD AUTO: 63.6 % (ref 42.7–76)
NRBC BLD AUTO-RTO: 0 /100 WBC (ref 0–0.2)
PLATELET # BLD AUTO: 267 10*3/MM3 (ref 140–450)
RBC # BLD AUTO: 4.29 10*6/MM3 (ref 4.14–5.8)
WBC # BLD AUTO: 7.35 10*3/MM3 (ref 3.4–10.8)

## 2021-09-08 PROCEDURE — 99214 OFFICE O/P EST MOD 30 MIN: CPT | Performed by: INTERNAL MEDICINE

## 2021-09-08 NOTE — PROGRESS NOTES
Subjective   Kendall Her is a 76 y.o. male. Patient is here today for hospital follow-up from rectal bleeding.  Patient originally had a prostate biopsy and was having some bleeding from it.  When he restarted his Eliquis which he was on for a DVT last year he began bleeding more heavily.  He was kept overnight and although anemic his blood counts stabilized and he did not require any transfusions.  He was discharged home and supposed to have gastroenterology follow-up in a couple of weeks.  He is no longer having any bleeding and has not been restarted on the anticoagulant.  Chief Complaint   Patient presents with   • Hospital Follow [facility]       (Not on file)-  Risk for Readmission (LACE) Score: 1 (9/3/2021  6:01 AM)           Vitals:    09/08/21 1053   BP: 102/60   Pulse: 73   Resp: 8   Temp: 97.3 °F (36.3 °C)   SpO2: 96%     The following portions of the patient's history were reviewed and updated as appropriate: allergies, current medications, past family history, past medical history, past social history, past surgical history and problem list.    Past Medical History:   Diagnosis Date   • Acid reflux    • Colon cancer screening 01/14/2019    Cologuard testing: Negative results   • DVT (deep venous thrombosis) (CMS/[facility])    • Neck pain    • Osteopenia     lumbar spine   • Osteoporosis     left femoral neck   • Parathyroid adenoma    • Shoulder pain, left       Allergies   Allergen Reactions   • Peanut-Containing Drug Products Swelling     Patient states after eating some (more than a few) his throat seems to contract      Social History     Socioeconomic History   • Marital status:      Spouse name: Not on file   • Number of children: Not on file   • Years of education: Not on file   • Highest education level: Not on file   Tobacco Use   • Smoking status: Never Smoker   • Smokeless tobacco: Never Used   Vaping Use   • Vaping Use: Never assessed   Substance and Sexual Activity    • Alcohol use: Yes     Alcohol/week: 5.0 standard drinks     Types: 5 Cans of beer per week     Comment: weekly   • Drug use: No   • Sexual activity: Defer        Current Outpatient Medications:   •  cholecalciferol (VITAMIN D3) 25 MCG (1000 UT) tablet, Take 1,000 Units by mouth Daily., Disp: , Rfl:   •  Misc Natural Products (RESVERATROL DIET) capsule, , Disp: , Rfl:   •  nitrofurantoin (MACRODANTIN) 100 MG capsule, Take 100 mg by mouth 1 (One) Time. Take one 100 mg tablet once daily, Disp: , Rfl:   •  Unable to find, 1 each 1 (One) Time. Med Name: Relief Factor., Disp: , Rfl:      Objective     History of Present Illness     Review of Systems   Constitutional: Negative.    HENT: Negative.    Respiratory: Negative.    Cardiovascular: Negative.    Gastrointestinal: Positive for anal bleeding.   Genitourinary: Negative.    Musculoskeletal: Negative.    Skin: Negative.    Neurological: Negative.    Hematological: Negative.    Psychiatric/Behavioral: Negative.        Physical Exam  Vitals and nursing note reviewed.   Constitutional:       General: He is not in acute distress.     Appearance: Normal appearance. He is not ill-appearing.   HENT:      Head: Normocephalic and atraumatic.   Eyes:      General: No scleral icterus.     Conjunctiva/sclera: Conjunctivae normal.   Cardiovascular:      Rate and Rhythm: Normal rate and regular rhythm.      Heart sounds: Normal heart sounds.   Pulmonary:      Effort: Pulmonary effort is normal. No respiratory distress.      Breath sounds: Normal breath sounds. No wheezing or rales.   Musculoskeletal:         General: Normal range of motion.      Cervical back: Normal range of motion and neck supple.   Skin:     General: Skin is warm and dry.   Neurological:      General: No focal deficit present.      Mental Status: He is alert and oriented to person, place, and time.   Psychiatric:         Mood and Affect: Mood normal.         Behavior: Behavior normal.         ASSESSMENT I  reviewed the hospital records and laboratory studies from his recent stay.  Most recent hemoglobin was 11.7 hematocrit 34.6 and the patient is noted no further bleeding.  #1-rectal bleeding while anticoagulated following prostate biopsy  #2-history of DVT, August 2020 with none seen on recent venous Doppler.     Problems Addressed this Visit        Coag and Thromboembolic    History of deep vein thrombosis (DVT) of lower extremity       Gastrointestinal Abdominal     Rectal bleeding - Primary    Relevant Orders    Ambulatory Referral to Gastroenterology    CBC & Differential      Diagnoses       Codes Comments    Rectal bleeding    -  Primary ICD-10-CM: K62.5  ICD-9-CM: 569.3     History of deep vein thrombosis (DVT) of lower extremity     ICD-10-CM: Z86.718  ICD-9-CM: V12.51           Current outpatient and discharge medications have been reconciled for the patient.  Reviewed by: Antione Ivan MD      PLAN the patient needs follow-up with gastroenterology and I am referring him.  He will have a CBC drawn today to recheck on his blood count and assuming it stable or improved, he will continue with a good diet and regular medications.  He is already scheduled for follow-up with me in January with labs and will keep that appointment and should have a blood count then also.    There are no Patient Instructions on file for this visit.  No follow-ups on file.

## 2021-09-09 ENCOUNTER — TELEPHONE (OUTPATIENT)
Dept: FAMILY MEDICINE CLINIC | Facility: CLINIC | Age: 76
End: 2021-09-09

## 2021-09-09 NOTE — TELEPHONE ENCOUNTER
Spoke with a representative from Dr. Us's office. Per them, he is booking out until November. I called the other GI group at [place] and per them, they have APRN's that will be able to see him within the week for rectal bleeding. Patient was notified of the above and wishes to proceed with scheduling at [place]. The referral was sent over. They will call the patient to schedule. Nothing further needed at this time.        Kemal

## 2021-09-10 ENCOUNTER — TRANSCRIBE ORDERS (OUTPATIENT)
Dept: ADMINISTRATIVE | Facility: HOSPITAL | Age: 76
End: 2021-09-10

## 2021-09-10 DIAGNOSIS — C61 PROSTATE CANCER (HCC): Primary | ICD-10-CM

## 2021-09-13 NOTE — PROGRESS NOTES
"Chief Complaint   Patient presents with   • Rectal Bleeding           History of Present Illness  Patient is a 76-year-old male who presents today for follow-up of rectal bleeding.    He recently underwent transrectal prostate biopsy and following this developed rectal bleeding.  He was on Eliquis which was held for a few days prior to the biopsy.  Reports the bleeding was described as being large in amount and ultimately resulted in a syncopal episode.  He was evaluated in the emergency room and hemoglobin dropped to 11.6, from baseline around 15.  He was not transfused.  The bleeding resolved and he was discharged home.  A follow-up CBC drawn last week showed hemoglobin improved to 12.6.    He has had no further bleeding.    He denies any GI complaints.  He had no bleeding prior to his biopsy.  Denies any heartburn, reflux, nausea, vomiting, abdominal pain or bowel changes.    He reports he did a Cologuard around 2 years ago that was negative for colon cancer screening.  He has never had a colonoscopy.  Denies any family history of colon cancer.         Result Review :       Hemoglobin & Hematocrit, Blood (09/03/2021 06:24)   CBC & Differential (09/08/2021 11:45)   Office Visit with Antione Ivan MD (09/08/2021)       Vital Signs:   /70   Pulse 75   Temp 97.9 °F (36.6 °C) (Temporal)   Resp 16   Ht 182.9 cm (72\")   Wt 81.9 kg (180 lb 9.6 oz)   SpO2 100%   BMI 24.49 kg/m²     Body mass index is 24.49 kg/m².     Physical Exam  Vitals reviewed.   Constitutional:       General: He is not in acute distress.     Appearance: He is well-developed.   HENT:      Head: Normocephalic and atraumatic.   Pulmonary:      Effort: Pulmonary effort is normal. No respiratory distress.   Abdominal:      General: Abdomen is flat. Bowel sounds are normal. There is no distension.      Palpations: Abdomen is soft.      Tenderness: There is no abdominal tenderness.   Skin:     General: Skin is dry.      Coloration: Skin " is not pale.   Neurological:      Mental Status: He is alert and oriented to person, place, and time.   Psychiatric:         Thought Content: Thought content normal.           Assessment and Plan    Diagnoses and all orders for this visit:    1. Rectal bleeding (Primary)    2. Screening for colorectal cancer  -     Cologuard - Stool, Per Rectum         Discussion  Patient presents today for follow-up after hospital observation for rectal bleeding.  Bleeding felt to be secondary to transrectal prostate biopsy.  Bleeding has since resolved and he has no GI complaints.  Offered colonoscopy for screening purposes, patient would prefer to proceed with Cologuard which we will obtain.  If he experiences recurrent bleeding not in relation to biopsy, would recommend colonoscopy at that time patient was instructed to notify the office.          Follow Up   Return if symptoms worsen or fail to improve.    Patient Instructions   Obtain Cologuard for updated colon cancer screening.    Please notify the office if you develop any recurrent rectal bleeding.

## 2021-09-16 ENCOUNTER — OFFICE VISIT (OUTPATIENT)
Dept: GASTROENTEROLOGY | Facility: CLINIC | Age: 76
End: 2021-09-16

## 2021-09-16 VITALS
RESPIRATION RATE: 16 BRPM | WEIGHT: 180.6 LBS | OXYGEN SATURATION: 100 % | BODY MASS INDEX: 24.46 KG/M2 | HEIGHT: 72 IN | DIASTOLIC BLOOD PRESSURE: 70 MMHG | TEMPERATURE: 97.9 F | SYSTOLIC BLOOD PRESSURE: 124 MMHG | HEART RATE: 75 BPM

## 2021-09-16 DIAGNOSIS — Z12.11 SCREENING FOR COLORECTAL CANCER: ICD-10-CM

## 2021-09-16 DIAGNOSIS — K62.5 RECTAL BLEEDING: Primary | ICD-10-CM

## 2021-09-16 DIAGNOSIS — Z12.12 SCREENING FOR COLORECTAL CANCER: ICD-10-CM

## 2021-09-16 PROCEDURE — 99213 OFFICE O/P EST LOW 20 MIN: CPT | Performed by: NURSE PRACTITIONER

## 2021-09-16 NOTE — PATIENT INSTRUCTIONS
[facility]    Please notify the office if you develop any recurrent rectal bleeding.

## 2021-10-06 ENCOUNTER — HOSPITAL ENCOUNTER (OUTPATIENT)
Dept: NUCLEAR MEDICINE | Facility: HOSPITAL | Age: 76
Discharge: HOME | End: 2021-10-06

## 2021-10-06 ENCOUNTER — HOSPITAL ENCOUNTER (OUTPATIENT)
Dept: CT IMAGING | Facility: HOSPITAL | Age: 76
Discharge: HOME | End: 2021-10-06
Admitting: UROLOGY

## 2021-10-06 DIAGNOSIS — C61 PROSTATE CANCER (HCC): ICD-10-CM

## 2021-10-06 LAB — CREAT BLDA-MCNC: 1.1 MG/DL (ref 0.6–1.3)

## 2021-10-06 PROCEDURE — 74177 CT ABD & PELVIS W/CONTRAST: CPT

## 2021-10-06 PROCEDURE — 82565 ASSAY OF CREATININE: CPT

## 2021-10-06 PROCEDURE — 25010000002 IOPAMIDOL 61 % SOLUTION: Performed by: UROLOGY

## 2021-10-06 PROCEDURE — 0 TECHNETIUM MEDRONATE KIT: Performed by: UROLOGY

## 2021-10-06 PROCEDURE — 78306 BONE IMAGING WHOLE BODY: CPT

## 2021-10-06 PROCEDURE — A9503 TC99M MEDRONATE: HCPCS | Performed by: UROLOGY

## 2021-10-06 RX ORDER — TC 99M MEDRONATE 20 MG/10ML
19.4 INJECTION, POWDER, LYOPHILIZED, FOR SOLUTION INTRAVENOUS
Status: COMPLETED | OUTPATIENT
Start: 2021-10-06 | End: 2021-10-06

## 2021-10-06 RX ADMIN — Medication 19.4 MILLICURIE: at 08:57

## 2021-10-06 RX ADMIN — IOPAMIDOL 85 ML: 612 INJECTION, SOLUTION INTRAVENOUS at 10:12

## 2022-01-19 DIAGNOSIS — E78.5 DIET-CONTROLLED HYPERLIPIDEMIA: ICD-10-CM

## 2022-01-19 DIAGNOSIS — E55.9 VITAMIN D DEFICIENCY: ICD-10-CM

## 2022-01-19 DIAGNOSIS — R94.6 ABNORMAL RESULTS OF THYROID FUNCTION STUDIES: ICD-10-CM

## 2022-01-21 LAB
25(OH)D3+25(OH)D2 SERPL-MCNC: 55.2 NG/ML (ref 30–100)
ALBUMIN SERPL-MCNC: 4.6 G/DL (ref 3.7–4.7)
ALBUMIN/GLOB SERPL: 2.3 {RATIO} (ref 1.2–2.2)
ALP SERPL-CCNC: 55 IU/L (ref 44–121)
ALT SERPL-CCNC: 17 IU/L (ref 0–44)
AST SERPL-CCNC: 22 IU/L (ref 0–40)
BASOPHILS # BLD AUTO: 0 X10E3/UL (ref 0–0.2)
BASOPHILS NFR BLD AUTO: 0 %
BILIRUB SERPL-MCNC: 0.6 MG/DL (ref 0–1.2)
BUN SERPL-MCNC: 13 MG/DL (ref 8–27)
BUN/CREAT SERPL: 13 (ref 10–24)
CALCIUM SERPL-MCNC: 9.4 MG/DL (ref 8.6–10.2)
CHLORIDE SERPL-SCNC: 103 MMOL/L (ref 96–106)
CHOLEST SERPL-MCNC: 145 MG/DL (ref 100–199)
CO2 SERPL-SCNC: 27 MMOL/L (ref 20–29)
CREAT SERPL-MCNC: 1.04 MG/DL (ref 0.76–1.27)
EOSINOPHIL # BLD AUTO: 0.3 X10E3/UL (ref 0–0.4)
EOSINOPHIL NFR BLD AUTO: 5 %
ERYTHROCYTE [DISTWIDTH] IN BLOOD BY AUTOMATED COUNT: 13.2 % (ref 11.6–15.4)
GLOBULIN SER CALC-MCNC: 2 G/DL (ref 1.5–4.5)
GLUCOSE SERPL-MCNC: 88 MG/DL (ref 65–99)
HCT VFR BLD AUTO: 48.6 % (ref 37.5–51)
HDLC SERPL-MCNC: 59 MG/DL
HGB BLD-MCNC: 16.3 G/DL (ref 13–17.7)
IMM GRANULOCYTES # BLD AUTO: 0 X10E3/UL (ref 0–0.1)
IMM GRANULOCYTES NFR BLD AUTO: 0 %
LDLC SERPL CALC-MCNC: 73 MG/DL (ref 0–99)
LDLC/HDLC SERPL: 1.2 RATIO (ref 0–3.6)
LYMPHOCYTES # BLD AUTO: 1.5 X10E3/UL (ref 0.7–3.1)
LYMPHOCYTES NFR BLD AUTO: 22 %
MCH RBC QN AUTO: 28.1 PG (ref 26.6–33)
MCHC RBC AUTO-ENTMCNC: 33.5 G/DL (ref 31.5–35.7)
MCV RBC AUTO: 84 FL (ref 79–97)
MONOCYTES # BLD AUTO: 0.5 X10E3/UL (ref 0.1–0.9)
MONOCYTES NFR BLD AUTO: 6 %
NEUTROPHILS # BLD AUTO: 4.8 X10E3/UL (ref 1.4–7)
NEUTROPHILS NFR BLD AUTO: 67 %
PLATELET # BLD AUTO: 223 X10E3/UL (ref 150–450)
POTASSIUM SERPL-SCNC: 4.2 MMOL/L (ref 3.5–5.2)
PROT SERPL-MCNC: 6.6 G/DL (ref 6–8.5)
RBC # BLD AUTO: 5.8 X10E6/UL (ref 4.14–5.8)
SODIUM SERPL-SCNC: 142 MMOL/L (ref 134–144)
T4 FREE SERPL-MCNC: 1.2 NG/DL (ref 0.82–1.77)
TRIGL SERPL-MCNC: 64 MG/DL (ref 0–149)
TSH SERPL-ACNC: 6.05 UIU/ML (ref 0.45–4.5)
VLDLC SERPL CALC-MCNC: 13 MG/DL (ref 5–40)
WBC # BLD AUTO: 7.2 X10E3/UL (ref 3.4–10.8)

## 2022-01-27 ENCOUNTER — OFFICE VISIT (OUTPATIENT)
Dept: FAMILY MEDICINE CLINIC | Facility: CLINIC | Age: 77
End: 2022-01-27

## 2022-01-27 VITALS
WEIGHT: 186.2 LBS | RESPIRATION RATE: 18 BRPM | HEART RATE: 74 BPM | BODY MASS INDEX: 25.22 KG/M2 | DIASTOLIC BLOOD PRESSURE: 78 MMHG | TEMPERATURE: 97.1 F | OXYGEN SATURATION: 97 % | HEIGHT: 72 IN | SYSTOLIC BLOOD PRESSURE: 124 MMHG

## 2022-01-27 DIAGNOSIS — R97.20 PSA ELEVATION: ICD-10-CM

## 2022-01-27 DIAGNOSIS — E78.5 DIET-CONTROLLED HYPERLIPIDEMIA: ICD-10-CM

## 2022-01-27 DIAGNOSIS — R79.89 TSH ELEVATION: ICD-10-CM

## 2022-01-27 DIAGNOSIS — E55.9 VITAMIN D DEFICIENCY: ICD-10-CM

## 2022-01-27 DIAGNOSIS — M25.511 ACUTE PAIN OF RIGHT SHOULDER: Primary | ICD-10-CM

## 2022-01-27 DIAGNOSIS — E21.0 HYPERPARATHYROID BONE DISEASE: ICD-10-CM

## 2022-01-27 PROCEDURE — 1170F FXNL STATUS ASSESSED: CPT | Performed by: INTERNAL MEDICINE

## 2022-01-27 PROCEDURE — 99214 OFFICE O/P EST MOD 30 MIN: CPT | Performed by: INTERNAL MEDICINE

## 2022-01-27 PROCEDURE — 1159F MED LIST DOCD IN RCRD: CPT | Performed by: INTERNAL MEDICINE

## 2022-01-27 PROCEDURE — G0439 PPPS, SUBSEQ VISIT: HCPCS | Performed by: INTERNAL MEDICINE

## 2022-01-27 NOTE — PROGRESS NOTES
The ABCs of the Annual Wellness Visit  Subsequent Medicare Wellness Visit    Chief Complaint   Patient presents with   • Medicare Wellness-subsequent     PT HERE FOR AWV AND F/U LABS       Subjective    History of Present Illness:  Kendall Her is a 76 y.o. male who presents for a Subsequent Medicare Wellness Visit.  He is also here for follow-up on his hyperlipidemia, history of vitamin D deficiency, and TSH elevation.  He is generally feeling okay.  He is having some discomfort in his right shoulder and has a history of left frozen shoulder in the past.  However he wants to just do some home exercises and not see physical therapy or orthopedics at this point.    The following portions of the patient's history were reviewed and   updated as appropriate: allergies, current medications, past family history, past medical history, past social history, past surgical history and problem list.    Compared to one year ago, the patient feels his physical   health is the same.    Compared to one year ago, the patient feels his mental   health is the same.    Recent Hospitalizations:  This patient has had a [facility] admission record on file within the last 365 days.    Current Medical Providers:  Patient Care Team:  Antione Ivan MD as PCP - General (Internal Medicine)  Hetal Butler APRN as Nurse Practitioner (Nurse Practitioner)    Outpatient Medications Prior to Visit   Medication Sig Dispense Refill   • cholecalciferol (VITAMIN D3) 25 MCG (1000 UT) tablet Take 5,000 Units by mouth Daily.       No facility-administered medications prior to visit.       No opioid medication identified on active medication list. I have reviewed chart for other potential  high risk medication/s and harmful drug interactions in the elderly.          Aspirin is not on active medication list.  Aspirin use is not indicated based on review of current medical condition/s. Risk of harm outweighs potential benefits.  .    Patient  "Active Problem List   Diagnosis   • Degeneration of intervertebral disc of cervical region   • Neck pain   • Osteoporosis   • Vitamin D deficiency   • Diet-controlled hyperlipidemia   • Nocturnal leg cramps   • Hyperparathyroid bone disease (HCC)   • DVT of lower limb, acute (HCC)   • Lumbar disc disease with radiculopathy   • Acute leg pain, right   • TSH elevation   • PSA elevation   • Rectal bleeding   • Hematuria   • Hx of prostate biopsy   • Hypokalemia   • Syncope   • History of deep vein thrombosis (DVT) of lower extremity   • Anticoagulated     Advance Care Planning  Advance Directive is on file.  ACP discussion was held with the patient during this visit. Patient has an advance directive in EMR which is still valid.     Review of Systems   All other systems reviewed and are negative.       Objective    Vitals:    01/27/22 1051   BP: 124/78   Pulse: 74   Resp: 18   Temp: 97.1 °F (36.2 °C)   TempSrc: Oral   SpO2: 97%   Weight: 84.5 kg (186 lb 3.2 oz)   Height: 182.9 cm (72.01\")     BMI Readings from Last 1 Encounters:   01/27/22 25.25 kg/m²   BMI is above normal parameters. Recommendations include: exercise counseling    Does the patient have evidence of cognitive impairment? No    Physical Exam  Vitals and nursing note reviewed.   Constitutional:       General: He is not in acute distress.     Appearance: Normal appearance. He is not ill-appearing.   HENT:      Head: Normocephalic and atraumatic.   Eyes:      General: No scleral icterus.     Conjunctiva/sclera: Conjunctivae normal.   Cardiovascular:      Rate and Rhythm: Normal rate and regular rhythm.      Heart sounds: Normal heart sounds.   Pulmonary:      Effort: Pulmonary effort is normal. No respiratory distress.      Breath sounds: Normal breath sounds. No wheezing or rales.   Musculoskeletal:      Comments: Patient can abduct his right arm to about 90 degrees before starting to get pain   Skin:     General: Skin is warm and dry.   Neurological:      " General: No focal deficit present.      Mental Status: He is alert and oriented to person, place, and time.   Psychiatric:         Mood and Affect: Mood normal.         Behavior: Behavior normal.       Lab Results   Component Value Date    CHLPL 145 01/20/2022    TRIG 64 01/20/2022    HDL 59 01/20/2022    LDL 73 01/20/2022    VLDL 13 01/20/2022            HEALTH RISK ASSESSMENT    Smoking Status:  Social History     Tobacco Use   Smoking Status Never Smoker   Smokeless Tobacco Never Used     Alcohol Consumption:  Social History     Substance and Sexual Activity   Alcohol Use Yes   • Alcohol/week: 5.0 standard drinks   • Types: 5 Cans of beer per week    Comment: weekly     Fall Risk Screen:    JOANNA Fall Risk Assessment was completed, and patient is at LOW risk for falls.Assessment completed on:1/27/2022    Depression Screening:  PHQ-2/PHQ-9 Depression Screening 1/27/2022   Little interest or pleasure in doing things 0   Feeling down, depressed, or hopeless 0   Trouble falling or staying asleep, or sleeping too much -   Feeling tired or having little energy -   Poor appetite or overeating -   Feeling bad about yourself - or that you are a failure or have let yourself or your family down -   Trouble concentrating on things, such as reading the newspaper or watching television -   Moving or speaking so slowly that other people could have noticed. Or the opposite - being so fidgety or restless that you have been moving around a lot more than usual -   Thoughts that you would be better off dead, or of hurting yourself in some way -   Total Score 0   If you checked off any problems, how difficult have these problems made it for you to do your work, take care of things at home, or get along with other people? -       Health Habits and Functional and Cognitive Screening:  Functional & Cognitive Status 1/27/2022   Do you have difficulty preparing food and eating? No   Do you have difficulty bathing yourself, getting  dressed or grooming yourself? No   Do you have difficulty using the toilet? No   Do you have difficulty moving around from place to place? No   Do you have trouble with steps or getting out of a bed or a chair? No   Current Diet Well Balanced Diet   Dental Exam Up to date   Eye Exam Up to date   Exercise (times per week) Other   Current Exercises Include No Regular Exercise   Current Exercise Activities Include -   Do you need help using the phone?  No   Are you deaf or do you have serious difficulty hearing?  No   Do you need help with transportation? No   Do you need help shopping? No   Do you need help preparing meals?  No   Do you need help with housework?  No   Do you need help with laundry? No   Do you need help taking your medications? No   Do you need help managing money? No   Do you ever drive or ride in a car without wearing a seat belt? No   Have you felt unusual stress, anger or loneliness in the last month? No   Who do you live with? Spouse   If you need help, do you have trouble finding someone available to you? No   Have you been bothered in the last four weeks by sexual problems? No   Do you have difficulty concentrating, remembering or making decisions? No       Age-appropriate Screening Schedule:  Refer to the list below for future screening recommendations based on patient's age, sex and/or medical conditions. Orders for these recommended tests are listed in the plan section. The patient has been provided with a written plan.    Health Maintenance   Topic Date Due   • TDAP/TD VACCINES (1 - Tdap) Never done   • ZOSTER VACCINE (1 of 2) Never done   • LIPID PANEL  01/20/2023   • DXA SCAN  02/10/2023   • INFLUENZA VACCINE  Discontinued              Assessment/Plan CBC is completely normal.  CMP was essentially normal.  Lipid panel is stable with total cholesterol 145, HDL 59 and LDL 73.  TSH remains somewhat elevated at 6.05 but stable and free T4 is normal at 1.2 and clinically the patient seems  euthyroid.  Vitamin D level is quite normal at 55.2 on supplement  #1-hyperlipidemia, diet controlled  #2-vitamin D deficiency, corrected by supplement  #3-status post resection of parathyroid adenoma in the past, asymptomatic  #4-mildly elevated TSH but clinically euthyroid  #5-right shoulder pain with some limitation of motion    CMS Preventative Services Quick Reference  Risk Factors Identified During Encounter  Immunizations Discussed/Encouraged (specific Immunizations; Tdap, Shingrix and COVID19  The above risks/problems have been discussed with the patient.  Follow up actions/plans if indicated are seen below in the Assessment/Plan Section.  Pertinent information has been shared with the patient in the After Visit Summary.    There are no diagnoses linked to this encounter.    Follow Up: I recommended the patient get a Tdap immunization, consider the shingles immunizations and get his third Covid booster.  Patient also has a Cologuard test that is outstanding.  The patient will try and do some home exercises for his shoulder and does not wish to see physical therapy or orthopedics at this point.  He will continue current medicines as now and he probably will be investigating getting a new provider based on my nursing home plans.    No follow-ups on file.     An After Visit Summary and PPPS were made available to the patient.

## 2022-07-25 DIAGNOSIS — E78.5 DIET-CONTROLLED HYPERLIPIDEMIA: Primary | ICD-10-CM

## 2022-07-25 DIAGNOSIS — R94.6 ABNORMAL RESULTS OF THYROID FUNCTION STUDIES: ICD-10-CM

## 2022-07-25 DIAGNOSIS — E55.9 VITAMIN D DEFICIENCY: ICD-10-CM

## 2022-07-28 LAB
25(OH)D3+25(OH)D2 SERPL-MCNC: 55 NG/ML (ref 30–100)
ALBUMIN SERPL-MCNC: 4.3 G/DL (ref 3.7–4.7)
ALBUMIN/GLOB SERPL: 1.9 {RATIO} (ref 1.2–2.2)
ALP SERPL-CCNC: 59 IU/L (ref 44–121)
ALT SERPL-CCNC: 20 IU/L (ref 0–44)
AST SERPL-CCNC: 22 IU/L (ref 0–40)
BASOPHILS # BLD AUTO: 0.1 X10E3/UL (ref 0–0.2)
BASOPHILS NFR BLD AUTO: 1 %
BILIRUB SERPL-MCNC: 0.5 MG/DL (ref 0–1.2)
BUN SERPL-MCNC: 12 MG/DL (ref 8–27)
BUN/CREAT SERPL: 13 (ref 10–24)
CALCIUM SERPL-MCNC: 9.8 MG/DL (ref 8.6–10.2)
CHLORIDE SERPL-SCNC: 106 MMOL/L (ref 96–106)
CHOLEST SERPL-MCNC: 154 MG/DL (ref 100–199)
CO2 SERPL-SCNC: 26 MMOL/L (ref 20–29)
CREAT SERPL-MCNC: 0.92 MG/DL (ref 0.76–1.27)
EGFRCR SERPLBLD CKD-EPI 2021: 87 ML/MIN/1.73
EOSINOPHIL # BLD AUTO: 0.3 X10E3/UL (ref 0–0.4)
EOSINOPHIL NFR BLD AUTO: 4 %
ERYTHROCYTE [DISTWIDTH] IN BLOOD BY AUTOMATED COUNT: 13 % (ref 11.6–15.4)
GLOBULIN SER CALC-MCNC: 2.3 G/DL (ref 1.5–4.5)
GLUCOSE SERPL-MCNC: 63 MG/DL (ref 65–99)
HCT VFR BLD AUTO: 47 % (ref 37.5–51)
HDLC SERPL-MCNC: 59 MG/DL
HGB BLD-MCNC: 16 G/DL (ref 13–17.7)
IMM GRANULOCYTES # BLD AUTO: 0 X10E3/UL (ref 0–0.1)
IMM GRANULOCYTES NFR BLD AUTO: 0 %
LDLC SERPL CALC-MCNC: 74 MG/DL (ref 0–99)
LDLC/HDLC SERPL: 1.3 RATIO (ref 0–3.6)
LYMPHOCYTES # BLD AUTO: 1.7 X10E3/UL (ref 0.7–3.1)
LYMPHOCYTES NFR BLD AUTO: 24 %
MCH RBC QN AUTO: 29.1 PG (ref 26.6–33)
MCHC RBC AUTO-ENTMCNC: 34 G/DL (ref 31.5–35.7)
MCV RBC AUTO: 86 FL (ref 79–97)
MONOCYTES # BLD AUTO: 0.5 X10E3/UL (ref 0.1–0.9)
MONOCYTES NFR BLD AUTO: 6 %
NEUTROPHILS # BLD AUTO: 4.8 X10E3/UL (ref 1.4–7)
NEUTROPHILS NFR BLD AUTO: 65 %
PLATELET # BLD AUTO: 209 X10E3/UL (ref 150–450)
POTASSIUM SERPL-SCNC: 4.2 MMOL/L (ref 3.5–5.2)
PROT SERPL-MCNC: 6.6 G/DL (ref 6–8.5)
RBC # BLD AUTO: 5.49 X10E6/UL (ref 4.14–5.8)
SODIUM SERPL-SCNC: 145 MMOL/L (ref 134–144)
T4 FREE SERPL-MCNC: 1.19 NG/DL (ref 0.82–1.77)
TRIGL SERPL-MCNC: 118 MG/DL (ref 0–149)
TSH SERPL DL<=0.005 MIU/L-ACNC: 7.09 UIU/ML (ref 0.45–4.5)
VLDLC SERPL CALC-MCNC: 21 MG/DL (ref 5–40)
WBC # BLD AUTO: 7.4 X10E3/UL (ref 3.4–10.8)

## 2022-08-02 ENCOUNTER — OFFICE VISIT (OUTPATIENT)
Dept: FAMILY MEDICINE CLINIC | Facility: CLINIC | Age: 77
End: 2022-08-02

## 2022-08-02 VITALS
RESPIRATION RATE: 18 BRPM | SYSTOLIC BLOOD PRESSURE: 126 MMHG | HEART RATE: 82 BPM | HEIGHT: 72 IN | WEIGHT: 174.6 LBS | TEMPERATURE: 97.5 F | OXYGEN SATURATION: 99 % | DIASTOLIC BLOOD PRESSURE: 80 MMHG | BODY MASS INDEX: 23.65 KG/M2

## 2022-08-02 DIAGNOSIS — E78.5 DIET-CONTROLLED HYPERLIPIDEMIA: ICD-10-CM

## 2022-08-02 DIAGNOSIS — M81.8 OTHER OSTEOPOROSIS WITHOUT CURRENT PATHOLOGICAL FRACTURE: ICD-10-CM

## 2022-08-02 DIAGNOSIS — R97.20 PSA ELEVATION: Primary | ICD-10-CM

## 2022-08-02 DIAGNOSIS — R79.89 TSH ELEVATION: ICD-10-CM

## 2022-08-02 DIAGNOSIS — E55.9 VITAMIN D DEFICIENCY: ICD-10-CM

## 2022-08-02 DIAGNOSIS — M81.0 OSTEOPOROSIS, UNSPECIFIED OSTEOPOROSIS TYPE, UNSPECIFIED PATHOLOGICAL FRACTURE PRESENCE: ICD-10-CM

## 2022-08-02 PROCEDURE — 99213 OFFICE O/P EST LOW 20 MIN: CPT | Performed by: INTERNAL MEDICINE

## 2022-08-02 NOTE — PROGRESS NOTES
Subjective   Kendall Her is a 76 y.o. male. Patient is here today for follow-up on his hyperlipidemia, vitamin D deficiency, PSA elevation and elevated TSH.  He is generally feeling well and has no acute complaints.  He will be seeing urology, Dr. Sinclair next month and may be getting set up for another prostate biopsy.    Chief Complaint   Patient presents with   • Follow-up     Pt here for f/u on lab results.           Vitals:    08/02/22 1418   BP: 126/80   Pulse: 82   Resp: 18   Temp: 97.5 °F (36.4 °C)   SpO2: 99%     Body mass index is 23.67 kg/m².  The following portions of the patient's history were reviewed and updated as appropriate: allergies, current medications, past family history, past medical history, past social history, past surgical history and problem list.    Past Medical History:   Diagnosis Date   • Acid reflux    • Colon cancer screening 01/14/2019    Cologuard testing: Negative results   • DVT (deep venous thrombosis) (HCC)    • Neck pain    • Osteopenia     lumbar spine   • Osteoporosis     left femoral neck   • Parathyroid adenoma    • Shoulder pain, left       Allergies   Allergen Reactions   • Peanut-Containing Drug Products Swelling     Patient states after eating some (more than a few) his throat seems to contract      Social History     Socioeconomic History   • Marital status:    Tobacco Use   • Smoking status: Never Smoker   • Smokeless tobacco: Never Used   Vaping Use   • Vaping Use: Never used   Substance and Sexual Activity   • Alcohol use: Yes     Alcohol/week: 5.0 standard drinks     Types: 5 Cans of beer per week     Comment: weekly   • Drug use: No   • Sexual activity: Defer        Current Outpatient Medications:   •  cholecalciferol (VITAMIN D3) 25 MCG (1000 UT) tablet, Take 5,000 Units by mouth Daily., Disp: , Rfl:      Objective     History of Present Illness     Review of Systems   All other systems reviewed and are negative.      Physical Exam  Vitals and nursing  note reviewed.   Constitutional:       Appearance: Normal appearance.   HENT:      Head: Normocephalic and atraumatic.   Cardiovascular:      Rate and Rhythm: Normal rate and regular rhythm.      Heart sounds: Normal heart sounds.   Pulmonary:      Effort: Pulmonary effort is normal. No respiratory distress.      Breath sounds: Normal breath sounds. No wheezing or rales.   Musculoskeletal:         General: Normal range of motion.   Skin:     General: Skin is warm and dry.   Neurological:      General: No focal deficit present.      Mental Status: He is alert and oriented to person, place, and time.   Psychiatric:         Mood and Affect: Mood normal.         Behavior: Behavior normal.         ASSESSMENT CBC is normal.  CMP had a sugar of 63, sodium 145 and was otherwise normal.  Lipid panel is total cholesterol 154, HDL 59, LDL 74.  TSH was elevated at 7.09 and free T4 remains normal at 1.19.  Vitamin D level is normal at 55.  #1-elevated PSA with probable prostate biopsy in the future  #2-vitamin D deficiency, corrected on supplement  #3-elevated TSH but clinically euthyroid       Problems Addressed this Visit        Cardiac and Vasculature    Diet-controlled hyperlipidemia       Endocrine and Metabolic    Vitamin D deficiency       Genitourinary and Reproductive     PSA elevation - Primary    Relevant Orders    PSA DIAGNOSTIC ONLY       Musculoskeletal and Injuries    Osteoporosis       Symptoms and Signs    TSH elevation      Diagnoses       Codes Comments    PSA elevation    -  Primary ICD-10-CM: R97.20  ICD-9-CM: 790.93     Diet-controlled hyperlipidemia     ICD-10-CM: E78.5  ICD-9-CM: 272.4     Vitamin D deficiency     ICD-10-CM: E55.9  ICD-9-CM: 268.9     Osteoporosis, unspecified osteoporosis type, unspecified pathological fracture presence     ICD-10-CM: M81.0  ICD-9-CM: 733.00     TSH elevation     ICD-10-CM: R79.89  ICD-9-CM: 794.5           PLAN patient will get a PSA drawn before he leaves today and  will continue current medicines.  I plan on rechecking him in 6 months with laboratory studies    There are no Patient Instructions on file for this visit.  No follow-ups on file.

## 2022-08-03 LAB — PSA SERPL-MCNC: 9.2 NG/ML (ref 0–4)

## 2022-11-04 ENCOUNTER — TELEMEDICINE (OUTPATIENT)
Dept: FAMILY MEDICINE CLINIC | Facility: TELEHEALTH | Age: 77
End: 2022-11-04

## 2022-11-04 ENCOUNTER — E-VISIT (OUTPATIENT)
Dept: FAMILY MEDICINE CLINIC | Facility: TELEHEALTH | Age: 77
End: 2022-11-04

## 2022-11-04 DIAGNOSIS — J01.90 ACUTE NON-RECURRENT SINUSITIS, UNSPECIFIED LOCATION: Primary | ICD-10-CM

## 2022-11-04 PROCEDURE — 99213 OFFICE O/P EST LOW 20 MIN: CPT | Performed by: NURSE PRACTITIONER

## 2022-11-04 RX ORDER — AMOXICILLIN AND CLAVULANATE POTASSIUM 875; 125 MG/1; MG/1
1 TABLET, FILM COATED ORAL 2 TIMES DAILY
Qty: 20 TABLET | Refills: 0 | Status: SHIPPED | OUTPATIENT
Start: 2022-11-04 | End: 2023-01-12

## 2022-11-04 RX ORDER — FLUTICASONE PROPIONATE 50 MCG
2 SPRAY, SUSPENSION (ML) NASAL DAILY
Qty: 18.2 ML | Refills: 0 | Status: SHIPPED | OUTPATIENT
Start: 2022-11-04 | End: 2023-01-12

## 2022-11-04 NOTE — PROGRESS NOTES
You have chosen to receive care through a telehealth visit.  Do you consent to use a video/audio connection for your medical care today? Yes     CHIEF COMPLAINT  Chief Complaint   Patient presents with   • Nasal Congestion   • URI         HPI  Kendall Her is a 77 y.o. male  presents with complaint of nasal congestion and cough x 10 days. He complains of dizziness also    Review of Systems   HENT: Positive for congestion and postnasal drip.    Respiratory: Positive for cough.    All other systems reviewed and are negative.      Past Medical History:   Diagnosis Date   • Acid reflux    • Colon cancer screening 01/14/2019    Cologuard testing: Negative results   • DVT (deep venous thrombosis) ([facility])    • Neck pain    • Osteopenia     lumbar spine   • Osteoporosis     left femoral neck   • Parathyroid adenoma    • Shoulder pain, left        Family History   Problem Relation Age of Onset   • Alzheimer's disease Mother    • Alzheimer's disease Father    • Malig Hyperthermia Neg Hx        Social History     Socioeconomic History   • Marital status:    Tobacco Use   • Smoking status: Never   • Smokeless tobacco: Never   Vaping Use   • Vaping Use: Never used   Substance and Sexual Activity   • Alcohol use: Yes     Alcohol/week: 5.0 standard drinks     Types: 5 Cans of beer per week     Comment: weekly   • Drug use: No   • Sexual activity: Defer       Kendall Her  reports that he has never smoked. He has never used smokeless tobacco..             There were no vitals taken for this visit.    PHYSICAL EXAM  Physical Exam   Constitutional:       Unable to perform physical exam due to telephone visit only       Results for orders placed or performed in visit on 08/02/22   PSA DIAGNOSTIC ONLY    Specimen: Blood   Result Value Ref Range    PSA 9.2 (H) 0.0 - 4.0 ng/mL       Diagnoses and all orders for this visit:    1. Acute non-recurrent sinusitis, unspecified location (Primary)    Other orders  -      amoxicillin-clavulanate (Augmentin) 875-125 MG per tablet; Take 1 tablet by mouth 2 (Two) Times a Day.  Dispense: 20 tablet; Refill: 0  -     fluticasone (Flonase) 50 MCG/ACT nasal spray; 2 sprays into the nostril(s) as directed by provider Daily.  Dispense: 18.2 mL; Refill: 0          FOLLOW-UP  As discussed during visit with PCP/[facility] if no improvement or Urgent Care/Emergency Department if worsening of symptoms    Instructed pt to use [employer] daily.  Discussed obtaining fluy vaccine.  Pt declined    Patient verbalizes understanding of medication dosage, comfort measures, instructions for treatment and follow-up.    Trinidad Blake, APRN  11/04/2022  06:51 EDT    The use of a video visit has been reviewed with the patient and verbal informed consent has been obtained. Myself and Kendall Her participated in this visit. The patient is located in [address] [place] [address].    I am located in [place], [place]. Mychart and Zoom were utilized. I spent 20 minutes in the patient's chart for this visit.

## 2022-11-04 NOTE — E-VISIT ESCALATED
Patient escalated   Provider Trinidad Blake chose to escalate patient to another level of care because: Insufficient information to diagnose   Patient was sent the following message:   Based on the information you've provided us, you need to take another step to get care.   What to do now:   Setup a video visit   Please, schedule your video visit   Video visit     You won't be charged for your eVisit. If you paid with a credit card, the charge will be reversed.   Chief Complaint: Coronavirus (COVID-19), cold, sinus pain, allergy, or flu   Patient introduction   Patient is 77-year-old male with sore throat, chills, myalgia, and fatigue that started 6 to 9 days ago.   COVID-19 exposure, testing history, and vaccination status:    No known exposure to a person with a confirmed or suspected case of COVID-19.    No recent travel outside of their local community.    No history of COVID-19 testing since symptom onset.    Received 2 doses of the COVID-19 vaccine (Pfizer, Pfizer).   Received their most recent dose of the vaccine more than 14 days ago.   Risk factors for severe disease from COVID-19 infection:    Age 65 or older.    Cancer.   Warning. The following may warrant further investigation:    Dizziness that makes it hard to stand, walk, or do daily activities.   Patient-submitted comments: I typed much here and lost it. Maybe you can find it..   Patient did not request an excuse note.   General presentation   Patient saw improvement in symptoms for 1 to 2 days, followed by worsening of symptoms. Symptoms came on gradually.   Fever:    No fever.   Sinus and nasal symptoms:    No nasal or sinus congestion.    No nasal discharge.    No itchy nose or sneezing.   Throat symptoms:    Sore throat.    Previous tonsillectomy.    No known recent strep exposure.    Patient does not think they have strep.    Has pain when swallowing but can swallow liquids and solid foods.   Head and body aches:    Chills.    Myalgia.     Fatigue.    No headache.    No sweats.   Cough:    No cough.   Wheezing and shortness of breath:    No COPD diagnosis.    No asthma diagnosis.    No wheezing.    No shortness of breath.   Chest pain:    No chest pain.   Ear symptoms:    None.   Dizziness:    Dizziness that interferes with daily activities.   Allergies:    No history of allergies.   Flu exposure:    Recent close-proximity exposure to a person with a confirmed flu diagnosis.    Has not had a flu vaccine this season.   Not taking any over-the-counter medications for current symptoms.   Review of red flags/alarm symptoms:    No changes in alertness or awareness.    No symptoms suggesting airway obstruction.    No decreased urination.   Risk factors for antibiotic resistance:    Antibiotic use for similar symptoms within the last 30 days.   Self-exam:    Height: 182 centimeters    Weight: 77.5 kilograms    No difficulty moving their chin toward their chest.    No palatal petechiae.    Neck lymph nodes feel normal.    Has taken antibiotics for similar symptoms within the past month.   Current medications   Currently taking cholecalciferol 25 MCG (1000 UT) tablet and Mucinex.   Medication allergies   None.   Medication contraindication review   No history of anaphylactic reaction to beta-lactam antibiotics; aspirin triad; blood dyscrasia; bone marrow depression; catecholamine-releasing paraganglioma; coronary artery disease; coagulation disorder; congenital long QT syndrome; depression; electrolyte abnormalities; fungal infection; GI bleeding; GI obstruction; G6PD deficiency; heart arrhythmia; hypertension; mononucleosis; myasthenia; recent myocardial infarction; NSAID-induced asthma/urticaria; Parkinson's disease; pheochromocytoma; porphyria; Reye syndrome; seizure disorder; ulcerative colitis; and urinary retention.   No history of metoclopramide-associated dystonic reaction and tardive dyskinesia.   No known history of  amoxicillin-clavulanate-associated cholestatic jaundice or hepatic impairment.   No known history of azithromycin-associated cholestatic jaundice or hepatic impairment.   Past medical history   Immune conditions: No immunocompromising conditions. Patient currently has cancer. Patient is not getting current treatment for cancer.   Social history   High-risk household contacts: Patient's household includes one or more members of a group with risk factors for influenza complications, including a person 65 years or older.   Never smoked tobacco.   Assessment:   Patient determined to need a level of care not appropriate to be delivered through eVisit.   Plan:   Patient informed of need to seek in-person care   ----------   Electronically signed by FREDY Fenton FNP-BC on 2022-11-04 at 05:10AM   ----------   Patient Interview Transcript:   Please carefully consider each question and answer as best you can. This helps your provider give you the best care. Which of these symptoms are bothering you? Select all that apply.    Sore throat    Chills    Muscle or body aches    Fatigue or tiredness   Not selected:    Cough    Shortness of breath    Fever    Stuffed-up nose or sinuses    Runny nose    Itchy or watery eyes    Itchy nose or sneezing    Loss of smell or taste    Hoarse voice or loss of voice    Headache    Sweats    Nausea or vomiting    Diarrhea    I don't have any of these symptoms   Since your current symptoms started, have you been tested for COVID-19? Select one.    No   Not selected:    Yes   Have you gotten the COVID-19 vaccine? Select one.    Yes   Not selected:    No   How many total doses of the COVID-19 vaccine have you gotten? This includes boosters as well as additional doses for those who are immunocompromised. Select one.    2 doses   Not selected:    1 dose    3 doses    4 doses    5 doses   1st dose    Pfizer   Not selected:    J&J/Barbi    Moderna    Novavax   2nd dose    Pfizer   Not  "selected:    J&J/Arav    Moderna    Novavax   When did you get your most recent dose of the COVID-19 vaccine?    More than 14 days ago   Not selected:    Less than 48 hours (2 days) ago    48 to 72 hours (3 days) ago    3 to 5 days ago    5 to 7 days ago    7 to 14 days ago   In the last 14 days, have you traveled outside of your local community? This includes travel by car, RV, bus, train, or plane. Travel increases your chances of getting and spreading COVID-19. Select one.    No   Not selected:    Yes   In the last 14 days, have you had close contact with someone who has coronavirus (COVID-19)? \"Close contact\" means any of these: - Living in the same household as someone with COVID-19. - Caring for someone with COVID-19. - Being within 6 feet of someone with COVID-19 for a total of at least 15 minutes over a 24-hour period. For example, three 5-minute exposures for a total of 15 minutes. - Being in direct contact with respiratory droplets from someone with COVID-19 (being coughed on, kissing, sharing utensils). Select one.    No, not that I know of   Not selected:    Yes, a confirmed case    Yes, a suspected case   When did your current symptoms start? Select one.    6 to 9 days ago   Not selected:    Less than 48 hours ago    3 to 5 days ago    10 to 14 days ago    2 to 4 weeks ago    More than a month ago   Do you know the exact date your symptoms started? If so, enter the date as MM/DD/YY. Select one.    No   Not selected:    Yes (specify)   Did your symptoms come on suddenly or gradually? Select one.    Gradually   Not selected:    Suddenly    I'm not sure   Have your symptoms improved at all since they began? Select one.    Yes, but then they came back worse than before   Not selected:    Yes, but they haven't gone away completely    No    I'm not sure   It sounds like you felt better, but now you feel sick again. How long did you feel better? Select one.    1 to 2 days   Not selected:    Less than 1 " day    More than 2 days    I'm not sure   Can you swallow liquids and solid foods? A sore throat may be painful when swallowing, but it shouldn't prevent you from swallowing. Select one.    Yes, but it's painful   Not selected:    Yes, with ease    Yes, but it's uncomfortable    It's hard to swallow anything because it feels like liquids and food get stuck in my throat    No, I can't swallow anything, liquid or solid foods   Since your symptoms started, have you felt dizzy? Select one.    Yes, and it makes it hard to stand, walk, or do daily activities   Not selected:    Yes, but I can continue with my regular daily activities    No   Do you have chest pain? You might also feel it as discomfort, aching, tightness, or squeezing in the chest. Select one.    No   Not selected:    Yes   Have you urinated at least 3 times in the last 24 hours? Select one.    Yes   Not selected:    No   Changes in alertness or awareness may mean you need emergency care. Since your symptoms started, have you had any of these? Select all that apply.    None of the above   Not selected:    Confusion    Slurred speech    Not knowing where you are or what day it is    Difficulty staying conscious    Fainting or passing out   Do your symptoms include a whistling sound, or wheezing, when you breathe? Select one.    No   Not selected:    Yes    I'm not sure   Do you have any of these symptoms in your ear(s)? Select all that apply.    None of the above   Not selected:    Pain    Pressure    Fullness    Crackling or popping    Plugged or blocked sensation   Can you move your chin toward your chest?    Yes   Not selected:    No, my neck is too stiff   Are your tonsils larger than usual?    I've had my tonsils removed   Not selected:    Yes    No, not that I can tell   Are there red spots on the roof of your mouth or the back of your throat?    No, not that I can see   Not selected:    Yes   Are your glands/lymph nodes swollen, or does it hurt when  you touch them?    No, not that I can tell   Not selected:    Yes   In the past 2 weeks, has anyone around you (such as at school, work, or home) had a confirmed diagnosis of strep throat? A confirmed diagnosis means that a throat swab and lab test were done to verify a strep throat infection. Select one.    No, not that I know of   Not selected:    Yes   Do you think you might have strep throat? Select one.    No   Not selected:    Yes   In the past week, has anyone around you (such as at school, work, or home) had a confirmed diagnosis of the flu? A confirmed diagnosis means that a nose swab was done to verify a flu infection. Select all that apply.    I've been within touching distance of someone who has the flu   Not selected:    I live with someone who has the flu    I've walked by, or sat about 3 feet away from, someone who has the flu    I've been in the same building as someone who has the flu    No, not that I know of   Have you ever been diagnosed with asthma? Select one.    No   Not selected:    Yes   Have you ever been diagnosed with chronic obstructive pulmonary disease (COPD)? Select one.    No, not that I know of   Not selected:    Yes   In the past month, have you taken antibiotics for similar symptoms? Examples of antibiotics include amoxicillin, amoxicillin-clavulanate (Augmentin), penicillin, cefdinir (Omnicef), doxycycline, and clindamycin (Cleocin). Select one.    Yes   Not selected:    No   Do you have allergies (pollen, dust mites, mold, animal dander)? Select one.    No, not that I know of   Not selected:    Yes   Have you had a flu shot this season? Select one.    No, not that I know of   Not selected:    Yes, less than 2 weeks ago    Yes, 2 to 4 weeks ago    Yes, 1 to 3 months ago    Yes, 3 to 6 months ago    Yes, more than 6 months ago   The flu and COVID-19 can be more serious for people with certain conditions or characteristics. These questions help us figure out if you or anyone you  live with is at higher risk for complications from these infections. Do either of these statements apply to you? Select all that apply.    None of the above   Not selected:    I'm  or Native [place]    I'm a healthcare worker   Do you smoke tobacco? Select one.    No   Not selected:    Yes, every day    Yes, some days    No, I quit   Do you have any of these conditions? Select all that apply.    None of the above   Not selected:    Chronic lung disease, such as cystic fibrosis or interstitial fibrosis    Heart disease, such as congenital heart disease, congestive heart failure, or coronary artery disease    Disorder of the brain, spinal cord, or nerves and muscles, such as dementia, cerebral palsy, epilepsy, muscular dystrophy, or developmental delay    Metabolic disorder or mitochondrial disease    Cerebrovascular disease, such as stroke or another condition affecting the blood vessels or blood supply to the brain    Down syndrome    Mood disorder, including depression or schizophrenia spectrum disorders    Substance use disorder, such as alcohol, opioid, or cocaine use disorder    Tuberculosis   Do you live in a group care setting? Examples include: - Nursing home - Residential care - Psychiatric treatment facility - [facility] - Homeless shelter - Foster care setting Select one.    No   Not selected:    Yes   Are you a healthcare worker? Select one.    No   Not selected:    Yes   People with a very high body mass index (BMI) are at higher risk for developing complications from the flu and severe illness from COVID-19. To determine your BMI, we need to know your weight and height. Please enter your weight (in pounds).    Weight   Please enter your height.    Height   Do you have any of these conditions that can affect the immune system? Scroll to see all options. Select all that apply.    None of these   Not selected:    History of bone marrow transplant    Chronic  kidney disease    Chronic liver disease (including cirrhosis)    HIV/AIDS    Inflammatory bowel disease (Crohn's disease or ulcerative colitis)    Lupus    Moderate to severe plaque psoriasis    Multiple sclerosis    Rheumatoid arthritis    Sickle cell anemia    Alpha or beta thalassemia    History of solid organ transplant (kidney, liver, or heart)    History of spleen removal    An autoimmune disorder not listed here    A condition requiring treatment with long-term use of oral steroids (such as prednisone, prednisolone, or dexamethasone)   Have you ever been diagnosed with cancer? Select one.    Yes, I have cancer now   Not selected:    Yes, but I'm in remission    No   What kind of cancer treatment are you getting? Select all that apply.    None   Not selected:    Chemotherapy    Radiation therapy    Immunotherapy    Hormone therapy, such as Tamoxifen, Arimidex, or Femara    A treatment not listed here    I'm getting treatment, but I don't know what it is   Do any of these apply to you? Select all that apply.    None of the above   Not selected:    I've been hospitalized within the last 5 days    I have diabetes    I'm in close contact with a child in    Do any of these apply to the people who live with you? Select all that apply.    An adult 65 or older   Not selected:    A child under the age of 5    A person who is pregnant    A person who has given birth, had a miscarriage, had a pregnancy loss, or had an  in the last 2 weeks    An  or Native [place]    None of the above   Does any member of your household have any of these medical conditions? Select all that apply.    None of the above   Not selected:    Asthma    Disorders of the brain, spinal cord, or nerves and muscles, such as dementia, cerebral palsy, epilepsy, muscular dystrophy, or developmental delay    Chronic lung disease, such as COPD or cystic fibrosis    Heart disease, such as congenital heart disease, congestive  heart failure, or coronary artery disease    Cerebrovascular disease, such as stroke or another condition affecting the blood vessels or blood supply to the brain    Blood disorders, such as sickle cell disease    Diabetes    Metabolic disorders such as inherited metabolic disorders or mitochondrial disease    Kidney disorders    Liver disorders    Weakened immune system due to illness or medications such as chemotherapy or steroids    Children under the age of 19 who are on long-term aspirin therapy    Extreme obesity (BMI > 40)   Do you have any of these conditions? Scroll to see all options. Select all that apply.    None of the above   Not selected:    Aspirin triad (also known as Samter's triad or ASA triad)    Asthma or hives from taking aspirin or other NSAIDs, such as ibuprofen or naproxen    Blockage or narrowing of the blood vessels of the heart    Blood dyscrasia, such anemia, leukemia, lymphoma, or myeloma    Bone marrow depression    Catecholamine-releasing paraganglioma    Blood clotting disorder    Congenital long QT syndrome    Depression    Difficulty urinating or completely emptying your bladder    Uncorrected electrolyte abnormalities    Fungal infection    Gastrointestinal (GI) bleeding    Gastrointestinal (GI) obstruction    G6PD deficiency    Recent heart attack    High blood pressure    Irregular heartbeat or heart rhythm    Mononucleosis (mono)    Myasthenia gravis    Parkinson's disease    Pheochromocytoma    Reye syndrome    Seizure disorder    Ulcerative colitis   Have you ever had either of these conditions? Select all that apply.    No   Not selected:    Metoclopramide-associated dystonic reaction    Tardive dyskinesia   Just a few more questions about medications, and then you're finished. Have you used any non-prescription medications or nasal sprays for your current symptoms? Examples include saline sprays, decongestants, NyQuil, and Tylenol. Select one.    No   Not selected:    Yes    Have you taken any monoamine oxidase inhibitor (MAOI) medications in the last 14 days? Examples include rasagiline (Azilect), selegiline (Eldepryl, Zelapar), isocarboxazid (Marplan), phenelzine (Nardil), and tranylcypromine (Parnate). Select one.    No, not that I know of   Not selected:    Yes   Do you take Kynmobi or Apokyn (apomorphine)? Select one.    No   Not selected:    Yes   Are you still taking these medications listed in your medical record? If you're not taking any of these, click Next. Select all that apply.    cholecalciferol 25 MCG (1000 UT) tablet   Are you taking any other medications, vitamins, or supplements? Select one.    Yes   Not selected:    No   Have you ever had an allergic or bad reaction to any medication? Select one.    No   Not selected:    Yes   Are you allergic to milk or to the proteins found in milk (for example, whey or casein)? A milk allergy is different from lactose intolerance. Select one.    No, not that I know of   Not selected:    Yes   Have you ever had jaundice or liver problems as a result of taking amoxicillin-clavulanate (Augmentin)? Jaundice is a condition in which the skin and the whites of the eyes turn yellow. Select all that apply.    No, not that I know of   Not selected:    Yes, jaundice    Yes, liver problems   Have you ever had jaundice or liver problems as a result of taking azithromycin (Zithromax, Zmax)? Jaundice is a condition in which the skin and the whites of the eyes turn yellow. Select all that apply.    No, not that I know of   Not selected:    Yes, jaundice    Yes, liver problems   Do you need a doctor's note? A doctor's note confirms that you received care today and states when you can return to school or work. It does not contain information about your diagnosis or treatment plan. Your provider will make the final decision on whether to give you a doctor's note and for how long. Doctor's notes CANNOT be backdated. We can't provide medical leave  paperwork through this type of visit. If more paperwork is needed to request time off, contact your primary care provider. Select one.    No   Not selected:    Today only (1 day)    Today and tomorrow (2 days)    3 days    5 days    7 days    10 days    14 days   Is there anything else you'd like to tell us about your symptoms?    I typed much here and lost it. Maybe you can find it.   ----------   Medical history   Medical history data does not currently exist for this patient.

## 2022-11-05 ENCOUNTER — TELEPHONE (OUTPATIENT)
Dept: FAMILY MEDICINE CLINIC | Facility: TELEHEALTH | Age: 77
End: 2022-11-05

## 2022-11-05 NOTE — TELEPHONE ENCOUNTER
Spoke with patient on the phone regarding symptoms.  Patient is currently on Augmentin for sinusitis for which he has had for 10 days.  Patient states a few days ago he developed a sore throat that has developed a sore throat that has significantly worsened.  His throat is severely sore to the point that it is preventing sleep.  He states it is very painful to swallow and he is also experiencing some nausea when he tries to eat.  Patient states he has not tested for COVID and he does have a home test.  Advised patient to take the home test to rule this out, if test is positive he should follow-up with us for further treatment and recommendations.  Advised patient to continue Augmentin along with Tylenol, Motrin, salt water gargles and other symptomatic treatment.  If symptoms worsen he should follow-up in person.  Patient is agreeable to plan.    FREDY Aquino

## 2022-11-29 ENCOUNTER — TRANSCRIBE ORDERS (OUTPATIENT)
Dept: ADMINISTRATIVE | Facility: HOSPITAL | Age: 77
End: 2022-11-29

## 2022-11-29 ENCOUNTER — HOSPITAL ENCOUNTER (OUTPATIENT)
Dept: CARDIOLOGY | Facility: HOSPITAL | Age: 77
Discharge: HOME | End: 2022-11-29

## 2022-11-29 ENCOUNTER — LAB (OUTPATIENT)
Dept: LAB | Facility: HOSPITAL | Age: 77
End: 2022-11-29

## 2022-11-29 DIAGNOSIS — C61 MALIGNANT NEOPLASM OF PROSTATE: Primary | ICD-10-CM

## 2022-11-29 DIAGNOSIS — C61 MALIGNANT NEOPLASM OF PROSTATE: ICD-10-CM

## 2022-11-29 LAB
ANION GAP SERPL CALCULATED.3IONS-SCNC: 7.4 MMOL/L (ref 5–15)
BASOPHILS # BLD AUTO: 0.05 10*3/MM3 (ref 0–0.2)
BASOPHILS NFR BLD AUTO: 0.8 % (ref 0–1.5)
BUN SERPL-MCNC: 11 MG/DL (ref 8–23)
BUN/CREAT SERPL: 10.9 (ref 7–25)
CALCIUM SPEC-SCNC: 9.3 MG/DL (ref 8.6–10.5)
CHLORIDE SERPL-SCNC: 104 MMOL/L (ref 98–107)
CO2 SERPL-SCNC: 30.6 MMOL/L (ref 22–29)
CREAT SERPL-MCNC: 1.01 MG/DL (ref 0.76–1.27)
DEPRECATED RDW RBC AUTO: 39.6 FL (ref 37–54)
EGFRCR SERPLBLD CKD-EPI 2021: 77.1 ML/MIN/1.73
EOSINOPHIL # BLD AUTO: 0.23 10*3/MM3 (ref 0–0.4)
EOSINOPHIL NFR BLD AUTO: 3.5 % (ref 0.3–6.2)
ERYTHROCYTE [DISTWIDTH] IN BLOOD BY AUTOMATED COUNT: 12.9 % (ref 12.3–15.4)
GLUCOSE SERPL-MCNC: 83 MG/DL (ref 65–99)
HCT VFR BLD AUTO: 47.3 % (ref 37.5–51)
HGB BLD-MCNC: 15.8 G/DL (ref 13–17.7)
IMM GRANULOCYTES # BLD AUTO: 0.01 10*3/MM3 (ref 0–0.05)
IMM GRANULOCYTES NFR BLD AUTO: 0.2 % (ref 0–0.5)
LYMPHOCYTES # BLD AUTO: 1.64 10*3/MM3 (ref 0.7–3.1)
LYMPHOCYTES NFR BLD AUTO: 25.3 % (ref 19.6–45.3)
MCH RBC QN AUTO: 28.7 PG (ref 26.6–33)
MCHC RBC AUTO-ENTMCNC: 33.4 G/DL (ref 31.5–35.7)
MCV RBC AUTO: 86 FL (ref 79–97)
MONOCYTES # BLD AUTO: 0.54 10*3/MM3 (ref 0.1–0.9)
MONOCYTES NFR BLD AUTO: 8.3 % (ref 5–12)
NEUTROPHILS NFR BLD AUTO: 4.02 10*3/MM3 (ref 1.7–7)
NEUTROPHILS NFR BLD AUTO: 61.9 % (ref 42.7–76)
NRBC BLD AUTO-RTO: 0 /100 WBC (ref 0–0.2)
PLATELET # BLD AUTO: 183 10*3/MM3 (ref 140–450)
PMV BLD AUTO: 10.4 FL (ref 6–12)
POTASSIUM SERPL-SCNC: 4.3 MMOL/L (ref 3.5–5.2)
QT INTERVAL: 388 MS
RBC # BLD AUTO: 5.5 10*6/MM3 (ref 4.14–5.8)
SODIUM SERPL-SCNC: 142 MMOL/L (ref 136–145)
WBC NRBC COR # BLD: 6.49 10*3/MM3 (ref 3.4–10.8)

## 2022-11-29 PROCEDURE — 80048 BASIC METABOLIC PNL TOTAL CA: CPT

## 2022-11-29 PROCEDURE — 85025 COMPLETE CBC W/AUTO DIFF WBC: CPT

## 2022-11-29 PROCEDURE — 93010 ELECTROCARDIOGRAM REPORT: CPT | Performed by: INTERNAL MEDICINE

## 2022-11-29 PROCEDURE — 93005 ELECTROCARDIOGRAM TRACING: CPT | Performed by: UROLOGY

## 2022-11-29 PROCEDURE — 36415 COLL VENOUS BLD VENIPUNCTURE: CPT

## 2023-01-12 ENCOUNTER — APPOINTMENT (OUTPATIENT)
Dept: CT IMAGING | Facility: HOSPITAL | Age: 78
End: 2023-01-12
Payer: MEDICARE

## 2023-01-12 ENCOUNTER — READMISSION MANAGEMENT (OUTPATIENT)
Dept: CALL CENTER | Facility: HOSPITAL | Age: 78
End: 2023-01-12
Payer: MEDICARE

## 2023-01-12 ENCOUNTER — HOSPITAL ENCOUNTER (OUTPATIENT)
Facility: HOSPITAL | Age: 78
Discharge: HOME | End: 2023-01-12
Attending: EMERGENCY MEDICINE | Admitting: EMERGENCY MEDICINE
Payer: MEDICARE

## 2023-01-12 ENCOUNTER — APPOINTMENT (OUTPATIENT)
Dept: CARDIOLOGY | Facility: HOSPITAL | Age: 78
End: 2023-01-12
Payer: MEDICARE

## 2023-01-12 ENCOUNTER — APPOINTMENT (OUTPATIENT)
Dept: GENERAL RADIOLOGY | Facility: HOSPITAL | Age: 78
End: 2023-01-12
Payer: MEDICARE

## 2023-01-12 ENCOUNTER — APPOINTMENT (OUTPATIENT)
Dept: MRI IMAGING | Facility: HOSPITAL | Age: 78
End: 2023-01-12
Payer: MEDICARE

## 2023-01-12 VITALS
DIASTOLIC BLOOD PRESSURE: 57 MMHG | OXYGEN SATURATION: 98 % | HEART RATE: 87 BPM | SYSTOLIC BLOOD PRESSURE: 94 MMHG | RESPIRATION RATE: 18 BRPM | HEIGHT: 72 IN | BODY MASS INDEX: 22.62 KG/M2 | TEMPERATURE: 98.6 F | WEIGHT: 167 LBS

## 2023-01-12 DIAGNOSIS — R07.9 CHEST PAIN, UNSPECIFIED TYPE: ICD-10-CM

## 2023-01-12 DIAGNOSIS — R42 DIZZINESS: Primary | ICD-10-CM

## 2023-01-12 LAB
ALBUMIN SERPL-MCNC: 4.1 G/DL (ref 3.5–5.2)
ALBUMIN/GLOB SERPL: 2 G/DL
ALP SERPL-CCNC: 57 U/L (ref 39–117)
ALT SERPL W P-5'-P-CCNC: 26 U/L (ref 1–41)
ANION GAP SERPL CALCULATED.3IONS-SCNC: 11.8 MMOL/L (ref 5–15)
AORTIC DIMENSIONLESS INDEX: 0.9 (DI)
AST SERPL-CCNC: 26 U/L (ref 1–40)
BASOPHILS # BLD AUTO: 0.05 10*3/MM3 (ref 0–0.2)
BASOPHILS NFR BLD AUTO: 0.5 % (ref 0–1.5)
BH CV ECHO MEAS - AO MAX PG: 6.2 MMHG
BH CV ECHO MEAS - AO MEAN PG: 3.4 MMHG
BH CV ECHO MEAS - AO ROOT DIAM: 3.8 CM
BH CV ECHO MEAS - AO V2 MAX: 124.9 CM/SEC
BH CV ECHO MEAS - AO V2 VTI: 24.7 CM
BH CV ECHO MEAS - AVA(I,D): 2.8 CM2
BH CV ECHO MEAS - EDV(CUBED): 81.4 ML
BH CV ECHO MEAS - EDV(MOD-SP2): 120 ML
BH CV ECHO MEAS - EDV(MOD-SP4): 106 ML
BH CV ECHO MEAS - EF(MOD-BP): 68 %
BH CV ECHO MEAS - EF(MOD-SP2): 72.5 %
BH CV ECHO MEAS - EF(MOD-SP4): 61.3 %
BH CV ECHO MEAS - ESV(CUBED): 18.4 ML
BH CV ECHO MEAS - ESV(MOD-SP2): 33 ML
BH CV ECHO MEAS - ESV(MOD-SP4): 41 ML
BH CV ECHO MEAS - FS: 39.1 %
BH CV ECHO MEAS - IVS/LVPW: 0.89 CM
BH CV ECHO MEAS - IVSD: 0.58 CM
BH CV ECHO MEAS - LAT PEAK E' VEL: 10 CM/SEC
BH CV ECHO MEAS - LV MASS(C)D: 75.9 GRAMS
BH CV ECHO MEAS - LV MAX PG: 4.4 MMHG
BH CV ECHO MEAS - LV MEAN PG: 2.45 MMHG
BH CV ECHO MEAS - LV V1 MAX: 104.6 CM/SEC
BH CV ECHO MEAS - LV V1 VTI: 22.3 CM
BH CV ECHO MEAS - LVIDD: 4.3 CM
BH CV ECHO MEAS - LVIDS: 2.6 CM
BH CV ECHO MEAS - LVOT AREA: 3 CM2
BH CV ECHO MEAS - LVOT DIAM: 1.97 CM
BH CV ECHO MEAS - LVPWD: 0.65 CM
BH CV ECHO MEAS - MED PEAK E' VEL: 8.3 CM/SEC
BH CV ECHO MEAS - MV A DUR: 0.13 SEC
BH CV ECHO MEAS - MV A MAX VEL: 79.5 CM/SEC
BH CV ECHO MEAS - MV DEC SLOPE: 178.3 CM/SEC2
BH CV ECHO MEAS - MV DEC TIME: 155 MSEC
BH CV ECHO MEAS - MV E MAX VEL: 86.9 CM/SEC
BH CV ECHO MEAS - MV E/A: 1.09
BH CV ECHO MEAS - MV MEAN PG: 1.06 MMHG
BH CV ECHO MEAS - MV P1/2T: 133.8 MSEC
BH CV ECHO MEAS - MV V2 VTI: 37.9 CM
BH CV ECHO MEAS - MVA(P1/2T): 1.64 CM2
BH CV ECHO MEAS - MVA(VTI): 1.79 CM2
BH CV ECHO MEAS - PA V2 MAX: 84.5 CM/SEC
BH CV ECHO MEAS - RAP SYSTOLE: 3 MMHG
BH CV ECHO MEAS - RV MAX PG: 1.31 MMHG
BH CV ECHO MEAS - RV V1 MAX: 57.3 CM/SEC
BH CV ECHO MEAS - RV V1 VTI: 13.4 CM
BH CV ECHO MEAS - RVSP: 7 MMHG
BH CV ECHO MEAS - SV(LVOT): 68 ML
BH CV ECHO MEAS - SV(MOD-SP2): 87 ML
BH CV ECHO MEAS - SV(MOD-SP4): 65 ML
BH CV ECHO MEAS - TAPSE (>1.6): 1.7 CM
BH CV ECHO MEAS - TR MAX PG: 4 MMHG
BH CV ECHO MEAS - TR MAX VEL: 99.8 CM/SEC
BH CV ECHO MEASUREMENTS AVERAGE E/E' RATIO: 9.5
BH CV XLRA - TDI S': 13 CM/SEC
BILIRUB SERPL-MCNC: 0.4 MG/DL (ref 0–1.2)
BUN SERPL-MCNC: 13 MG/DL (ref 8–23)
BUN/CREAT SERPL: 14.3 (ref 7–25)
CALCIUM SPEC-SCNC: 8.8 MG/DL (ref 8.6–10.5)
CHLORIDE SERPL-SCNC: 104 MMOL/L (ref 98–107)
CHOLEST SERPL-MCNC: 133 MG/DL (ref 0–200)
CO2 SERPL-SCNC: 25.2 MMOL/L (ref 22–29)
CREAT SERPL-MCNC: 0.91 MG/DL (ref 0.76–1.27)
DEPRECATED RDW RBC AUTO: 42.4 FL (ref 37–54)
EGFRCR SERPLBLD CKD-EPI 2021: 87.3 ML/MIN/1.73
EOSINOPHIL # BLD AUTO: 0.04 10*3/MM3 (ref 0–0.4)
EOSINOPHIL NFR BLD AUTO: 0.4 % (ref 0.3–6.2)
ERYTHROCYTE [DISTWIDTH] IN BLOOD BY AUTOMATED COUNT: 13.1 % (ref 12.3–15.4)
GLOBULIN UR ELPH-MCNC: 2.1 GM/DL
GLUCOSE SERPL-MCNC: 166 MG/DL (ref 65–99)
HBA1C MFR BLD: 4.8 % (ref 4.8–5.6)
HCT VFR BLD AUTO: 44.7 % (ref 37.5–51)
HDLC SERPL-MCNC: 61 MG/DL (ref 40–60)
HGB BLD-MCNC: 14.9 G/DL (ref 13–17.7)
HOLD SPECIMEN: NORMAL
IMM GRANULOCYTES # BLD AUTO: 0.03 10*3/MM3 (ref 0–0.05)
IMM GRANULOCYTES NFR BLD AUTO: 0.3 % (ref 0–0.5)
LDLC SERPL CALC-MCNC: 61 MG/DL (ref 0–100)
LDLC/HDLC SERPL: 1.02 {RATIO}
LEFT ATRIUM VOLUME INDEX: 17.8 ML/M2
LYMPHOCYTES # BLD AUTO: 0.93 10*3/MM3 (ref 0.7–3.1)
LYMPHOCYTES NFR BLD AUTO: 8.5 % (ref 19.6–45.3)
MAXIMAL PREDICTED HEART RATE: 144 BPM
MCH RBC QN AUTO: 29.3 PG (ref 26.6–33)
MCHC RBC AUTO-ENTMCNC: 33.3 G/DL (ref 31.5–35.7)
MCV RBC AUTO: 87.8 FL (ref 79–97)
MONOCYTES # BLD AUTO: 0.37 10*3/MM3 (ref 0.1–0.9)
MONOCYTES NFR BLD AUTO: 3.4 % (ref 5–12)
NEUTROPHILS NFR BLD AUTO: 86.9 % (ref 42.7–76)
NEUTROPHILS NFR BLD AUTO: 9.54 10*3/MM3 (ref 1.7–7)
NRBC BLD AUTO-RTO: 0 /100 WBC (ref 0–0.2)
PLATELET # BLD AUTO: 196 10*3/MM3 (ref 140–450)
PMV BLD AUTO: 10.1 FL (ref 6–12)
POTASSIUM SERPL-SCNC: 3.5 MMOL/L (ref 3.5–5.2)
PROT SERPL-MCNC: 6.2 G/DL (ref 6–8.5)
QT INTERVAL: 387 MS
RBC # BLD AUTO: 5.09 10*6/MM3 (ref 4.14–5.8)
SODIUM SERPL-SCNC: 141 MMOL/L (ref 136–145)
STRESS TARGET HR: 122 BPM
T3FREE SERPL-MCNC: 2.4 PG/ML (ref 2–4.4)
T4 FREE SERPL-MCNC: 0.97 NG/DL (ref 0.93–1.7)
TRIGL SERPL-MCNC: 48 MG/DL (ref 0–150)
TROPONIN T SERPL-MCNC: <0.01 NG/ML (ref 0–0.03)
TROPONIN T SERPL-MCNC: <0.01 NG/ML (ref 0–0.03)
TSH SERPL DL<=0.05 MIU/L-ACNC: 6.41 UIU/ML (ref 0.27–4.2)
VLDLC SERPL-MCNC: 11 MG/DL (ref 5–40)
WBC NRBC COR # BLD: 10.96 10*3/MM3 (ref 3.4–10.8)
WHOLE BLOOD HOLD COAG: NORMAL
WHOLE BLOOD HOLD SPECIMEN: NORMAL

## 2023-01-12 PROCEDURE — 99214 OFFICE O/P EST MOD 30 MIN: CPT | Performed by: NURSE PRACTITIONER

## 2023-01-12 PROCEDURE — G0378 HOSPITAL OBSERVATION PER HR: HCPCS

## 2023-01-12 PROCEDURE — 83036 HEMOGLOBIN GLYCOSYLATED A1C: CPT | Performed by: STUDENT IN AN ORGANIZED HEALTH CARE EDUCATION/TRAINING PROGRAM

## 2023-01-12 PROCEDURE — 80061 LIPID PANEL: CPT | Performed by: STUDENT IN AN ORGANIZED HEALTH CARE EDUCATION/TRAINING PROGRAM

## 2023-01-12 PROCEDURE — 70450 CT HEAD/BRAIN W/O DYE: CPT

## 2023-01-12 PROCEDURE — 99204 OFFICE O/P NEW MOD 45 MIN: CPT | Performed by: INTERNAL MEDICINE

## 2023-01-12 PROCEDURE — 71046 X-RAY EXAM CHEST 2 VIEWS: CPT

## 2023-01-12 PROCEDURE — 84481 FREE ASSAY (FT-3): CPT | Performed by: STUDENT IN AN ORGANIZED HEALTH CARE EDUCATION/TRAINING PROGRAM

## 2023-01-12 PROCEDURE — 84484 ASSAY OF TROPONIN QUANT: CPT | Performed by: EMERGENCY MEDICINE

## 2023-01-12 PROCEDURE — 93005 ELECTROCARDIOGRAM TRACING: CPT | Performed by: EMERGENCY MEDICINE

## 2023-01-12 PROCEDURE — 93005 ELECTROCARDIOGRAM TRACING: CPT

## 2023-01-12 PROCEDURE — 25010000002 PERFLUTREN (DEFINITY) 8.476 MG IN SODIUM CHLORIDE (PF) 0.9 % 10 ML INJECTION: Performed by: STUDENT IN AN ORGANIZED HEALTH CARE EDUCATION/TRAINING PROGRAM

## 2023-01-12 PROCEDURE — 93306 TTE W/DOPPLER COMPLETE: CPT | Performed by: INTERNAL MEDICINE

## 2023-01-12 PROCEDURE — 84439 ASSAY OF FREE THYROXINE: CPT | Performed by: STUDENT IN AN ORGANIZED HEALTH CARE EDUCATION/TRAINING PROGRAM

## 2023-01-12 PROCEDURE — 84443 ASSAY THYROID STIM HORMONE: CPT | Performed by: STUDENT IN AN ORGANIZED HEALTH CARE EDUCATION/TRAINING PROGRAM

## 2023-01-12 PROCEDURE — 85025 COMPLETE CBC W/AUTO DIFF WBC: CPT

## 2023-01-12 PROCEDURE — 99285 EMERGENCY DEPT VISIT HI MDM: CPT

## 2023-01-12 PROCEDURE — 70551 MRI BRAIN STEM W/O DYE: CPT

## 2023-01-12 PROCEDURE — 70496 CT ANGIOGRAPHY HEAD: CPT

## 2023-01-12 PROCEDURE — 80053 COMPREHEN METABOLIC PANEL: CPT | Performed by: EMERGENCY MEDICINE

## 2023-01-12 PROCEDURE — 93010 ELECTROCARDIOGRAM REPORT: CPT | Performed by: INTERNAL MEDICINE

## 2023-01-12 PROCEDURE — 0 IOPAMIDOL PER 1 ML: Performed by: EMERGENCY MEDICINE

## 2023-01-12 PROCEDURE — 36415 COLL VENOUS BLD VENIPUNCTURE: CPT

## 2023-01-12 PROCEDURE — 70498 CT ANGIOGRAPHY NECK: CPT

## 2023-01-12 PROCEDURE — 93306 TTE W/DOPPLER COMPLETE: CPT

## 2023-01-12 RX ORDER — ONDANSETRON 2 MG/ML
4 INJECTION INTRAMUSCULAR; INTRAVENOUS EVERY 6 HOURS PRN
Status: DISCONTINUED | OUTPATIENT
Start: 2023-01-12 | End: 2023-01-12 | Stop reason: HOSPADM

## 2023-01-12 RX ORDER — ACETAMINOPHEN 325 MG/1
650 TABLET ORAL EVERY 4 HOURS PRN
Status: DISCONTINUED | OUTPATIENT
Start: 2023-01-12 | End: 2023-01-12 | Stop reason: HOSPADM

## 2023-01-12 RX ORDER — SODIUM CHLORIDE 0.9 % (FLUSH) 0.9 %
10 SYRINGE (ML) INJECTION AS NEEDED
Status: DISCONTINUED | OUTPATIENT
Start: 2023-01-12 | End: 2023-01-12 | Stop reason: HOSPADM

## 2023-01-12 RX ORDER — ATORVASTATIN CALCIUM 20 MG/1
40 TABLET, FILM COATED ORAL NIGHTLY
Status: DISCONTINUED | OUTPATIENT
Start: 2023-01-12 | End: 2023-01-12 | Stop reason: HOSPADM

## 2023-01-12 RX ORDER — ONDANSETRON 4 MG/1
4 TABLET, FILM COATED ORAL EVERY 6 HOURS PRN
Status: DISCONTINUED | OUTPATIENT
Start: 2023-01-12 | End: 2023-01-12 | Stop reason: HOSPADM

## 2023-01-12 RX ORDER — NITROGLYCERIN 0.4 MG/1
0.4 TABLET SUBLINGUAL
Status: DISCONTINUED | OUTPATIENT
Start: 2023-01-12 | End: 2023-01-12 | Stop reason: HOSPADM

## 2023-01-12 RX ORDER — CHOLECALCIFEROL (VITAMIN D3) 125 MCG
5 CAPSULE ORAL NIGHTLY PRN
Status: DISCONTINUED | OUTPATIENT
Start: 2023-01-12 | End: 2023-01-12 | Stop reason: HOSPADM

## 2023-01-12 RX ORDER — SODIUM CHLORIDE 9 MG/ML
40 INJECTION, SOLUTION INTRAVENOUS AS NEEDED
Status: DISCONTINUED | OUTPATIENT
Start: 2023-01-12 | End: 2023-01-12 | Stop reason: HOSPADM

## 2023-01-12 RX ORDER — MELATONIN
2000 DAILY
Status: DISCONTINUED | OUTPATIENT
Start: 2023-01-12 | End: 2023-01-12 | Stop reason: HOSPADM

## 2023-01-12 RX ORDER — SODIUM CHLORIDE 9 MG/ML
100 INJECTION, SOLUTION INTRAVENOUS CONTINUOUS
Status: DISCONTINUED | OUTPATIENT
Start: 2023-01-12 | End: 2023-01-12 | Stop reason: HOSPADM

## 2023-01-12 RX ORDER — SODIUM CHLORIDE 0.9 % (FLUSH) 0.9 %
10 SYRINGE (ML) INJECTION EVERY 12 HOURS SCHEDULED
Status: DISCONTINUED | OUTPATIENT
Start: 2023-01-12 | End: 2023-01-12 | Stop reason: HOSPADM

## 2023-01-12 RX ORDER — ASPIRIN 325 MG
325 TABLET ORAL ONCE
Status: COMPLETED | OUTPATIENT
Start: 2023-01-12 | End: 2023-01-12

## 2023-01-12 RX ADMIN — Medication 10 ML: at 08:14

## 2023-01-12 RX ADMIN — IOPAMIDOL 95 ML: 755 INJECTION, SOLUTION INTRAVENOUS at 08:52

## 2023-01-12 RX ADMIN — NITROGLYCERIN 0.4 MG: 0.4 TABLET SUBLINGUAL at 05:02

## 2023-01-12 RX ADMIN — PERFLUTREN 2 ML: 6.52 INJECTION, SUSPENSION INTRAVENOUS at 11:07

## 2023-01-12 RX ADMIN — ASPIRIN 325 MG: 325 TABLET ORAL at 05:01

## 2023-01-12 RX ADMIN — SODIUM CHLORIDE 100 ML/HR: 9 INJECTION, SOLUTION INTRAVENOUS at 14:30

## 2023-01-12 NOTE — ED NOTES
Nursing report ED to floor  Kendall Her  77 y.o.  male    HPI :   Chief Complaint   Patient presents with    Chest Pain    Vomiting    Dizziness       Admitting doctor:   Cal Lai MD    Admitting diagnosis:   The primary encounter diagnosis was Dizziness. A diagnosis of Chest pain, unspecified type was also pertinent to this visit.    Code status:   Current Code Status       Date Active Code Status Order ID Comments User Context       Prior            Allergies:   Peanut-containing drug products    Isolation:   No active isolations    Intake and Output  No intake or output data in the 24 hours ending 01/12/23 0810    Weight:       01/12/23  0355   Weight: 77.1 kg (170 lb)       Most recent vitals:   Vitals:    01/12/23 0548 01/12/23 0601 01/12/23 0701 01/12/23 0801   BP:  105/61 111/56 109/52   BP Location:       Patient Position:       Pulse: 61 63 66 73   Resp:       Temp:       TempSrc:       SpO2: 97% 96% 96% 97%   Weight:       Height:           Active LDAs/IV Access:   Lines, Drains & Airways       Active LDAs       Name Placement date Placement time Site Days    Peripheral IV 01/12/23 0404 Right Antecubital 01/12/23  0404  Antecubital  less than 1                    Labs (abnormal labs have a star):   Labs Reviewed   COMPREHENSIVE METABOLIC PANEL - Abnormal; Notable for the following components:       Result Value    Glucose 166 (*)     All other components within normal limits    Narrative:     GFR Normal >60  Chronic Kidney Disease <60  Kidney Failure <15    The GFR formula is only valid for adults with stable renal function between ages 18 and 70.   CBC WITH AUTO DIFFERENTIAL - Abnormal; Notable for the following components:    WBC 10.96 (*)     Neutrophil % 86.9 (*)     Lymphocyte % 8.5 (*)     Monocyte % 3.4 (*)     Neutrophils, Absolute 9.54 (*)     All other components within normal limits   TROPONIN (IN-HOUSE) - Normal    Narrative:     Troponin T Reference Range:  <= 0.03 ng/mL-    Negative for AMI  >0.03 ng/mL-     Abnormal for myocardial necrosis.  Clinicians would have to utilize clinical acumen, EKG, Troponin and serial changes to determine if it is an Acute Myocardial Infarction or myocardial injury due to an underlying chronic condition.       Results may be falsely decreased if patient taking Biotin.     RAINBOW DRAW    Narrative:     The following orders were created for panel order [place] Draw.  Procedure                               Abnormality         Status                     ---------                               -----------         ------                     Green Top (Gel)[[id number]]                                  Final result               Lavender Top[[id number]]                                     Final result               Gold Top - SST[[id number]]                                                              Light Blue Top[[id number]]                                   Final result                 Please view results for these tests on the individual orders.   TROPONIN (IN-HOUSE)   LIPID PANEL   HEMOGLOBIN A1C   TSH   CBC AND DIFFERENTIAL    Narrative:     The following orders were created for panel order CBC & Differential.  Procedure                               Abnormality         Status                     ---------                               -----------         ------                     CBC Auto Differential[[id number]]        Abnormal            Final result                 Please view results for these tests on the individual orders.   GREEN TOP   LAVENDER TOP   LIGHT BLUE TOP   GOLD TOP - SST       EKG:   ECG 12 Lead ED Triage Standing Order; Chest Pain   Preliminary Result   HEART RATE= 89  bpm   RR Interval= 674  ms   NM Interval= 160  ms   P Horizontal Axis= 2  deg   P Front Axis= 58  deg   QRSD Interval= 99  ms   QT Interval= 387  ms   QRS Axis= -13  deg   T Wave Axis= 33  deg   - OTHERWISE NORMAL ECG -   Sinus rhythm   RSR' in V1 or V2, probably normal  variant   Electronically Signed By:    Date and Time of Study: 2023-01-12 03:52:08          Meds given in ED:   Medications   sodium chloride 0.9 % flush 10 mL (has no administration in time range)   nitroglycerin (NITROSTAT) SL tablet 0.4 mg (0.4 mg Sublingual Given 1/12/23 0502)   sodium chloride 0.9 % flush 10 mL (has no administration in time range)   sodium chloride 0.9 % flush 10 mL (has no administration in time range)   sodium chloride 0.9 % infusion 40 mL (has no administration in time range)   ondansetron (ZOFRAN) tablet 4 mg (has no administration in time range)     Or   ondansetron (ZOFRAN) injection 4 mg (has no administration in time range)   acetaminophen (TYLENOL) tablet 650 mg (has no administration in time range)   melatonin tablet 5 mg (has no administration in time range)   aspirin tablet 325 mg (325 mg Oral Given 1/12/23 0501)       Imaging results:  XR Chest 2 View    Result Date: 1/12/2023  No acute findings.  This report was finalized on 1/12/2023 4:22 AM by Dr. Alaina Blunt M.D.      CT Head Without Contrast    Result Date: 1/12/2023  There is no evidence of acute infarction or hemorrhage. The etiology for the patient's dizziness, weakness and unsteady gait is not established. Further evaluation could be performed with an MRI examination of the brain as indicated. There is mild prominence of the anterior communicating artery which may be a function of tortuosity. An aneurysm cannot be excluded. Further evaluation could be performed with an MRA of the head.    Radiation dose reduction techniques were utilized, including automated exposure control and exposure modulation based on body size.        Ambulatory status:   - Up x's 1 assist    Social issues:   Social History     Socioeconomic History    Marital status:    Tobacco Use    Smoking status: Never    Smokeless tobacco: Never   Vaping Use    Vaping Use: Never used   Substance and Sexual Activity    Alcohol use: Yes      Alcohol/week: 5.0 standard drinks     Types: 5 Cans of beer per week     Comment: weekly    Drug use: No    Sexual activity: Defer       NIH Stroke Scale:         Sarah Arredondo RN  01/12/23 08:10 EST

## 2023-01-12 NOTE — PLAN OF CARE
Goal Outcome Evaluation:              Outcome Evaluation: patient left observation unit at this time with all his belongings, discharge summary provided and education included patient is aware to follow up with cardiology as indicated, VSS and stable at the time of discharge. patient had no further questions prior to discharge.

## 2023-01-12 NOTE — DISCHARGE INSTRUCTIONS
Continue home medication as prescribed.  You have a follow-up appointment with cardiology with an outpatient stress test set up, the office should contact you regarding this appointment.  You can follow-up with neurology on an as-needed basis.  Follow-up with your primary care doctor in 1 to 2 weeks.    Return to the emergency department with worsening symptoms, uncontrolled pain, inability to tolerate oral liquids, fever greater than 101°F not controlled by Tylenol or as needed with emergent concerns.

## 2023-01-12 NOTE — ED TRIAGE NOTES
Patient to ED per PV from home w/ reports of chest pain, nausea x3 hours. Patient took Tums and Pepcid PTA w/o symptom relief. Patient reports dizziness started approx 1.5 h PTA, started while sitting; patient had been walking to and from the bathroom attempting to vomit. During triage, patient began vomiting.    Patient and staff w/ appropriate PPE in place throughout encounter.

## 2023-01-12 NOTE — CASE MANAGEMENT/SOCIAL WORK
Discharge Planning Assessment  [facility]     Patient Name: Kendall Her  MRN: [id number]  Today's Date: 1/12/2023    Admit Date: 1/12/2023    Plan: Plans to return home at d/c-ANN Hernandez RN   Discharge Needs Assessment     Row Name 01/12/23 1529       Living Environment    People in Home spouse    Name(s) of People in Home Kasey    Current Living Arrangements home    Primary Care Provided by self    Provides Primary Care For no one    Family Caregiver if Needed spouse    Family Caregiver Names Kasey    Quality of Family Relationships supportive    Able to Return to Prior Arrangements yes       Resource/Environmental Concerns    Resource/Environmental Concerns none       Transition Planning    Patient/Family Anticipates Transition to home with family    Patient/Family Anticipated Services at Transition none       Discharge Needs Assessment    Equipment Currently Used at Home pulse ox    Concerns to be Addressed no discharge needs identified    Anticipated Changes Related to Illness none    Equipment Needed After Discharge none    Provided Post Acute Provider List? N/A    Provided Post Acute Provider Quality & Resource List? N/A               Discharge Plan     Row Name 01/12/23 0116       Plan    Plan Plans to return home at d/c-ANN Hernandez RN    Patient/Family in Agreement with Plan yes    Provided Post Acute Provider List? N/A    Provided Post Acute Provider Quality & Resource List? N/A    Plan Comments Spoke w/ patient and wife at bedside w/ patient's permission. PPE used. Introduced self and explained role. Info on facesheet verified. Lives in two story home w/ wife Kasey and is able to navigate steps and around home w/o difficulty. Independent w/ ADLs. Has O2 sat monitor at home. No assistive devices used. Denies need for any DME or community resources at d/c. Uses [facility] in [place], and is able to  and pay for meds. To return home at d/c; can arrange own transport. Agreeable w/ plan. CM  will continue to follow-ANN hernandez RN              Continued Care and Services - Admitted Since 1/12/2023    Coordination has not been started for this encounter.          Demographic Summary     Row Name 01/12/23 1528       General Information    Admission Type observation    Arrived From emergency department    Required Notices Provided Observation Status Notice    Referral Source admission list    Reason for Consult discharge planning    Preferred Language English               Functional Status     Row Name 01/12/23 1529       Functional Status    Usual Activity Tolerance good    Current Activity Tolerance good       Functional Status, IADL    Medications independent    Shopping independent       Mental Status    General Appearance WDL WDL       Mental Status Summary    Recent Changes in Mental Status/Cognitive Functioning no changes               Psychosocial     Row Name 01/12/23 1529       Behavior WDL    Behavior WDL WDL       Emotion Mood WDL    Emotion/Mood/Affect WDL WDL       Speech WDL    Speech WDL WDL       Perceptual State WDL    Perceptual State WDL WDL       Thought Process WDL    Thought Process WDL WDL       Intellectual Performance WDL    Intellectual Performance WDL WDL               Abuse/Neglect    No documentation.                Legal    No documentation.                Substance Abuse    No documentation.                Patient Forms    No documentation.                   Rekha Hernandez RN

## 2023-01-12 NOTE — CONSULTS
DOS: 2023  NAME: Kendall Her   : 1945  PCP: Antione Ivan MD  CC: Chest pain/dizziness  Referring MD: Cal Lai MD    Neurological Problem and Interval History:  77 y.o. male with history of DVT, osteoporosis, GERD, active prostate cancer presented to [facility]  due to awakening with chest pain during the night along with reflux-like symptoms.  Patient reports taking Tums and Prilosec with no noticeable relief.  He then experienced dizziness/nausea and being off balance.  He does not report any rotational sensation and does not notice anything making it worse or better specifically changing position.  All symptoms have since resolved.  He denies any prior episodes similar to this.  Patient denies any vision deficits specifically no loss of vision/double vision and or blurred vision.  He also denies any unilateral weakness or speech difficulty.  Since admission chest x-ray completed which was unremarkable.  CT of the head was negative.  CTA of the head and neck negative for acute findings-  noted duplicated/fenestrated anterior communicating artery.  MRI of the brain negative for acute findings specifically no mass/hemorrhage or stroke.  TTE pending.  Labs: A1c 4.8, troponin negative, LDL 61, HDL 61, TSH 6.410, CMP unremarkable with normal glucose/sodium/potassium and GFR.    Neurologically, stable with nonfocal exam-patient since returned to baseline with no recurrent symptoms of dizziness and/or chest pain.      Past Medical/Surgical Hx:  Past Medical History:   Diagnosis Date   • Acid reflux    • Colon cancer screening 2019    Cologuard testing: Negative results   • DVT (deep venous thrombosis) (HCC)    • Neck pain    • Osteopenia     lumbar spine   • Osteoporosis     left femoral neck   • Parathyroid adenoma    • Shoulder pain, left      Past Surgical History:   Procedure Laterality Date   • PARATHYROIDECTOMY N/A 2019    Procedure: right  superior parathyroid and left superior parathyroid resection.;  Surgeon: Milagro Gordon MD;  Location: [facility] OR;  Service: General   • TONSILLECTOMY         Review of Systems:        A complete review of all systems is negative except as described above.     Medications On Admission  Medications Prior to Admission   Medication Sig Dispense Refill Last Dose   • cholecalciferol (VITAMIN D3) 25 MCG (1000 UT) tablet Take 2,000 Units by mouth Daily.   1/11/2023 at 0800       Allergies:  Allergies   Allergen Reactions   • Peanut-Containing Drug Products Swelling     Patient states after eating some (more than a few) his throat seems to contract       Social Hx:  Social History     Socioeconomic History   • Marital status:    Tobacco Use   • Smoking status: Never   • Smokeless tobacco: Never   Vaping Use   • Vaping Use: Never used   Substance and Sexual Activity   • Alcohol use: Yes     Alcohol/week: 5.0 standard drinks     Types: 5 Cans of beer per week     Comment: weekly   • Drug use: No   • Sexual activity: Defer       Family Hx:  Family History   Problem Relation Age of Onset   • Alzheimer's disease Mother    • Alzheimer's disease Father    • Malig Hyperthermia Neg Hx        Review of Imaging (Interpretation of images not reports):    Interpretation Summary       •  Left ventricular systolic function is normal. Left ventricular ejection fraction appears to be 66 - 70%.  •  Left ventricular diastolic function was normal.  •  Saline test results are negative.  •  Estimated right ventricular systolic pressure from tricuspid regurgitation is normal (<35 mmHg).     MRI EXAMINATION OF THE BRAIN WITHOUT CONTRAST     HISTORY: Weakness, dizziness, unsteady gait.     COMPARISON: CT head 01/12/2023.     FINDINGS: There is no evidence of restricted diffusion, hydrocephalus or  of abnormal extra-axial fluid. Age appropriate atrophy and minimal small  vessel ischemic disease is noted. Mild mucosal thickening involving  the  maxillary sinuses bilaterally and mild to moderate mucosal thickening  involving the ethmoid air cells are noted. There is no evidence of mass  or mass effect on this noncontrasted MRI examination of the brain.     IMPRESSION:  There is no evidence of infarction, hydrocephalus or  mass/mass effect on this noncontrasted MRI examination of brain.  Age-appropriate atrophy and minimal small vessel ischemic disease is  noted.     This result has not been signed. Information might be incomplete.  CT ANGIOGRAM NECK AND HEAD WITH CONTRAST     HISTORY: Dizziness.     COMPARISON: CT head 01/12/2023.     Initially, a noncontrasted CT examination of the brain was performed.  There was no evidence of hemorrhage or of acute infarction.  Mild-to-moderate mucosal thickening involving the ethmoid air cells on  the right is noted. A CT angiogram of the neck and head was then  performed. Multiplanar as well as 3-dimensional reconstructions were  generated.     The great vessels are arranged in a classic configuration. There is 0%  stenosis of the internal carotid arteries bilaterally using NASCET  criteria. Venous contamination limits evaluation of the CT angiogram of  the head. The proximal aspects of the middle cerebral arteries appear  unremarkable. 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 There is no evidence of aneurysm. Otherwise, the  proximal aspects of the anterior cerebral arteries appear unremarkable.     Both vertebral arteries were opacified. The left vertebral artery is  slightly larger than that of the right. The basilar artery and the  proximal aspects of the posterior cerebral arteries appear unremarkable.     IMPRESSION:  There is no evidence of acute infarction or hemorrhage. 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 The  etiology for the patient's dizziness is not established. Further  evaluation could be performed with an MRI examination of the  brain.     CHECK CHECK      NOTE: This is a preliminary report. The 3-dimensional reconstructions  are not yet available for review.           Radiation dose reduction techniques were utilized, including automated  exposure control and exposure modulation based on body size.        This result has not been signed. Information might be incomplete.  CT HEAD WITHOUT CONTRAST     HISTORY: Dizziness.     COMPARISON: None.     FINDINGS: The brain and ventricles are symmetrical. There is no evidence  of hemorrhage, hydrocephalus or of abnormal extraaxial fluid. No focal  area of decreased attenuation to suggest acute infarction is identified.  There is mild prominence of the anterior communicating artery. This may  be secondary to tortuosity. Aneurysm cannot be entirely excluded.     IMPRESSION:  There is no evidence of acute infarction or hemorrhage. The  etiology for the patient's dizziness, weakness and unsteady gait is not  established. Further evaluation could be performed with an MRI  examination of the brain as indicated. There is mild prominence of the  anterior communicating artery which may be a function of tortuosity. An  aneurysm cannot be excluded. Further evaluation could be performed with  an MRA of the head.           Radiation dose reduction techniques were utilized, including automated  exposure control and exposure modulation based on body size.     This report was finalized on 1/12/2023 1:54 PM by Dr. Channing Zapata M.D.  PA AND LATERAL CHEST RADIOGRAPH     HISTORY: Chest pain     COMPARISON: None available.     FINDINGS:  Heart size is within normal limits. No pneumothorax, pleural effusion,  or acute infiltrate is seen. There is some mild elevation of the right  hemidiaphragm.     IMPRESSION:  No acute findings.     This report was finalized on 1/12/2023 4:22 AM by Dr. Alaina Blunt M.D.  CT ANGIOGRAM NECK AND HEAD WITH CONTRAST     HISTORY: Dizziness.     COMPARISON: CT head 01/12/2023.      Initially, a noncontrasted CT examination of the brain was performed.  There was no evidence of hemorrhage or of acute infarction.  Mild-to-moderate mucosal thickening involving the ethmoid air cells on  the right is noted. A CT angiogram of the neck and head was then  performed. Multiplanar as well as 3-dimensional reconstructions were  generated.     The great vessels are arranged in a classic configuration. There is 0%  stenosis of the internal carotid arteries bilaterally using NASCET  criteria. Venous contamination limits evaluation of the CT angiogram of  the head. The proximal aspects of the middle cerebral arteries appear  unremarkable. 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 There is no evidence of aneurysm. Otherwise, the  proximal aspects of the anterior cerebral arteries appear unremarkable.     Both vertebral arteries were opacified. The left vertebral artery is  slightly larger than that of the right. The basilar artery and the  proximal aspects of the posterior cerebral arteries appear unremarkable.     IMPRESSION:  There is no evidence of acute infarction or hemorrhage. 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 The  etiology for the patient's dizziness is not established. Further  evaluation could be performed with an MRI examination of the brain.     CHECK CHECK      NOTE: This is a preliminary report. The 3-dimensional reconstructions  are not yet available for review.           Radiation dose reduction techniques were utilized, including automated  exposure control and exposure modulation based on body size.        This result has not been signed. Information might be incomplete.  CT HEAD WITHOUT CONTRAST     HISTORY: Dizziness.     COMPARISON: None.     FINDINGS: The brain and ventricles are symmetrical. There is no evidence  of hemorrhage, hydrocephalus or of abnormal extraaxial fluid. No focal  area of decreased attenuation to  suggest acute infarction is identified.  There is mild prominence of the anterior communicating artery. This may  be secondary to tortuosity. Aneurysm cannot be entirely excluded.     IMPRESSION:  There is no evidence of acute infarction or hemorrhage. The  etiology for the patient's dizziness, weakness and unsteady gait is not  established. Further evaluation could be performed with an MRI  examination of the brain as indicated. There is mild prominence of the  anterior communicating artery which may be a function of tortuosity. An  aneurysm cannot be excluded. Further evaluation could be performed with  an MRA of the head.           Radiation dose reduction techniques were utilized, including automated  exposure control and exposure modulation based on body size.        This result has not been signed. Information might be incomplete.  PA AND LATERAL CHEST RADIOGRAPH     HISTORY: Chest pain     COMPARISON: None available.     FINDINGS:  Heart size is within normal limits. No pneumothorax, pleural effusion,  or acute infiltrate is seen. There is some mild elevation of the right  hemidiaphragm.     IMPRESSION:  No acute findings.     This report was finalized on 1/12/2023 4:22 AM by Dr. Alaina Blunt M.D.       Laboratory Results:   Lab Results   Component Value Date    GLUCOSE 166 (H) 01/12/2023    CALCIUM 8.8 01/12/2023     01/12/2023    K 3.5 01/12/2023    CO2 25.2 01/12/2023     01/12/2023    BUN 13 01/12/2023    CREATININE 0.91 01/12/2023    EGFRIFAFRI 81 01/20/2022    EGFRIFNONA 70 01/20/2022    BCR 14.3 01/12/2023    ANIONGAP 11.8 01/12/2023     Lab Results   Component Value Date    WBC 10.96 (H) 01/12/2023    HGB 14.9 01/12/2023    HCT 44.7 01/12/2023    MCV 87.8 01/12/2023     01/12/2023     Lab Results   Component Value Date    CHOL 133 01/12/2023     Lab Results   Component Value Date    HDL 61 (H) 01/12/2023    HDL 59 07/27/2022    HDL 59 01/20/2022     Lab Results   Component  "Value Date    LDL 61 01/12/2023    LDL 74 07/27/2022    LDL 73 01/20/2022     Lab Results   Component Value Date    TRIG 48 01/12/2023    TRIG 118 07/27/2022    TRIG 64 01/20/2022     Lab Results   Component Value Date    HGBA1C 4.80 01/12/2023     Lab Results   Component Value Date    INR 1.07 09/01/2020    INR 1.0 08/12/2020    PROTIME 11.0 08/12/2020         Physical Examination:  BP 94/57 (BP Location: Right arm, Patient Position: Lying)   Pulse 87   Temp 98.6 °F (37 °C) (Oral)   Resp 18   Ht 182.9 cm (72\")   Wt 75.8 kg (167 lb)   SpO2 98%   BMI 22.65 kg/m²   General Appearance:   Well developed, well nourished, well groomed, alert, and cooperative.  HEENT: Normocephalic.    Neck and Spine: Normal range of motion.  Normal alignment. No mass or tenderness. No bruits.  Cardiac: Regular rate and rhythm. No murmurs.  Peripheral Vasculature: Radial and pedal pulses are equal and     symmetric.   Extremities:    No edema or deformities. Normal joint     ROM.   Skin:    No rashes or birth marks.    Neurological examination:  Higher Integrative  Function: Oriented to time, place and person. Normal registration, recall, attention span and concentration. Normal language including comprehension, spontaneous speech, repetition, reading, writing, naming and vocabulary. No neglect with normal visual-spatial function and construction. Normal fund of knowledge and higher integrative function.  CN II: Pupils are equal, round, and reactive to light. Normal visual acuity and visual fields.    CN III IV VI: Extraocular movements are full without nystagmus.   CN V: Normal facial sensation and strength of muscles of mastication.  CN VII: Facial movements are symmetric. No weakness.  CN VIII:   Auditory acuity is normal.  CN IX & X:   Symmetric palatal movement.  CN XI: Sternocleidomastoid and trapezius are normal.  No weakness.  CN XII:   The tongue is midline.  No atrophy or fasciculations.  Motor: Normal muscle strength, " bulk and tone in upper and lower extremities.  No fasciculations, rigidity, spasticity, or abnormal movements.  Reflexes: Plantar responses are flexor.  Sensation: Normal to light touch in arms and legs.   Station and Gait: Normal gait and station.    Coordination:  Finger-to-nose test shows no dysmetria.        Diagnoses:  1.  Lightheadedness/dizziness with associated nausea and chest pain-etiology unclear although not felt to be due to TIA.  Stroke since excluded as MRI negative for acute findings.  Aspirin and statin not indicated from neurology perspective.  2.  Chest pain; resolved- negative troponin-pending cardiology consult        Plan:  · No further neurology workup indicated. We will sign off and see again upon request.       Updated observation unit APC of sign off plan

## 2023-01-12 NOTE — PROGRESS NOTES
MD ATTESTATION NOTE    The AB and I have discussed this patient's history, physical exam, and treatment plan.  I have reviewed the documentation and personally had a face to face interaction with the patient. I affirm the documentation and agree with the treatment and plan.  The attached note describes my personal findings.      I provided a substantive portion of the care of the patient.  I personally performed the physical exam in its entirety, and below are my findings.  For this patient encounter, the patient wore surgical mask, I wore full protective PPE including N95 and eye protection.      Brief HPI: Patient is being admitted for further evaluation of chest discomfort and dizziness.  Patient woke up around midnight with burning discomfort in his mid chest.  States this felt like heartburn.  He took Tums and Pepcid without relief.  Chest pain did not radiate.  Reports mild shortness of breath.  Chest pain improved with nitroglycerin.   He then became nauseated and lightheaded.  He checked his heart rate and it was  in the 50s.  Denies history of hypertension, hyperlipidemia, diabetes, CAD, or family history of CAD.  In the ED, head CT and chest x-ray were negative acute troponin was negative.  TSH was elevated.  EKG did not have any acute ischemic changes.    PHYSICAL EXAM  ED Triage Vitals [01/12/23 0355]   Temp Heart Rate Resp BP SpO2   97.8 °F (36.6 °C) 88 18 152/80 96 %      Temp src Heart Rate Source Patient Position BP Location FiO2 (%)   Tympanic Monitor Lying Left arm --         GENERAL: Awake, alert, oriented x3.  Well-developed, well-nourished elderly male.  Resting comfortably in no acute distress  HENT: nares patent  EYES: EOMI, no nystagmus  CV: regular rhythm, normal rate  RESPIRATORY: normal effort, clear to auscultation bilateral  ABDOMEN: soft, nontender  MUSCULOSKELETAL: Extremities are nontender with full range of motion  NEURO: Speech is clear and fluent.  No aphasia.  No facial droop.   Normal strength and light touch sensation all extremities  PSYCH:  calm, cooperative  SKIN: warm, dry    Vital signs and nursing notes reviewed.        Plan:     1) chest pain--symptoms are atypical--> cardiac monitor.  Trend troponin.  Consult cardiology  2) dizziness--head CT was negative acute.  Neuro exam is unremarkable--> consult neurology

## 2023-01-12 NOTE — DISCHARGE SUMMARY
ED OBSERVATION PROGRESS/DISCHARGE SUMMARY    Date of Admission: 1/12/2023   LOS: 0 days   PCP: Antione Ivan MD      Subjective    Patient states he is feeling much better now.  He denies any dizziness or lightheadedness.  Denies any further nausea or vomiting.  Denies abdominal pain.  Denies fevers and chills.  Denies chest pain and shortness of breath.    Hospital Outcome:   77-year-old male admitted to the observation unit for further evaluation of chest pain and dizziness.  In the ER, patient had reported improvement of symptoms after been given nitroglycerin.  Head CT was performed that showed no evidence of acute infarction or hemorrhage.  Chest x-ray was negative for acute findings.  Troponin negative x2 and EKG showed no acute ischemic changes.  CTA of the head and neck showed no evidence of acute infarction or hemorrhage with note of a duplicated/fenestrated anterior communicating artery.  An MRI of the brain without was negative for acute findings notes age-appropriate atrophy and minimal small vessel ischemic disease noted.  Echocardiogram was also performed that showed normal LV function with an EF of 66 to 70%.  Neurology saw and evaluated the patient noting no further neurologic work-up indicated at this time.  Patient can follow-up with them in the office as needed.  Cardiology saw and evaluated the patient and will have him set up for outpatient stress test and follow-up.  I discussed all of the findings and plan with the patient who endorses understanding is in agreement.      ROS:  General: no fevers, chills  Respiratory: no cough, dyspnea  Cardiovascular: no chest pain, palpitations  Abdomen: No abdominal pain, nausea, vomiting, or diarrhea  Neurologic: No focal weakness    Objective   Physical Exam:  I have reviewed the vital signs.  Temp:  [97.8 °F (36.6 °C)-98.7 °F (37.1 °C)] 98.6 °F (37 °C)  Heart Rate:  [61-88] 87  Resp:  [16-18] 18  BP: ()/(52-80) 94/57  General Appearance:     Alert, cooperative, no distress  Head:    Normocephalic, atraumatic  Eyes:    Sclerae anicteric  Neck:   Supple, no mass  Lungs: Clear to auscultation bilaterally, respirations unlabored  Heart: Regular rate and rhythm, S1 and S2 normal, no murmur, rub or gallop  Abdomen:  Soft, non-tender, bowel sounds active, nondistended  Extremities: No clubbing, cyanosis, or edema to lower extremities  Pulses:  2+ and symmetric in distal lower extremities  Skin: No rashes   Neurologic: Oriented x3, Normal strength to extremities    Results Review:    I have reviewed the labs, radiology results and diagnostic studies.    Results from last 7 days   Lab Units 01/12/23  0408   WBC 10*3/mm3 10.96*   HEMOGLOBIN g/dL 14.9   HEMATOCRIT % 44.7   PLATELETS 10*3/mm3 196     Results from last 7 days   Lab Units 01/12/23  0408   SODIUM mmol/L 141   POTASSIUM mmol/L 3.5   CHLORIDE mmol/L 104   CO2 mmol/L 25.2   BUN mg/dL 13   CREATININE mg/dL 0.91   CALCIUM mg/dL 8.8   BILIRUBIN mg/dL 0.4   ALK PHOS U/L 57   ALT (SGPT) U/L 26   AST (SGOT) U/L 26   GLUCOSE mg/dL 166*     Imaging Results (Last 24 Hours)     Procedure Component Value Units Date/Time    MRI Brain Without Contrast [[id number]] Collected: 01/12/23 1245     Updated: 01/12/23 1440    Narrative:      MRI EXAMINATION OF THE BRAIN WITHOUT CONTRAST     HISTORY: Weakness, dizziness, unsteady gait.     COMPARISON: CT head 01/12/2023.     FINDINGS: There is no evidence of restricted diffusion, hydrocephalus or  of abnormal extra-axial fluid. Age appropriate atrophy and minimal small  vessel ischemic disease is noted. Mild mucosal thickening involving the  maxillary sinuses bilaterally and mild to moderate mucosal thickening  involving the ethmoid air cells are noted. There is no evidence of mass  or mass effect on this noncontrasted MRI examination of the brain.       Impression:      There is no evidence of infarction, hydrocephalus or  mass/mass effect on this noncontrasted MRI  examination of brain.  Age-appropriate atrophy and minimal small vessel ischemic disease is  noted.     This report was finalized on 1/12/2023 2:36 PM by Dr. Channing Zapata M.D.       CT Head Without Contrast [[id number]] Collected: 01/12/23 0659     Updated: 01/12/23 1357    Narrative:      CT HEAD WITHOUT CONTRAST     HISTORY: Dizziness.     COMPARISON: None.     FINDINGS: The brain and ventricles are symmetrical. There is no evidence  of hemorrhage, hydrocephalus or of abnormal extraaxial fluid. No focal  area of decreased attenuation to suggest acute infarction is identified.  There is mild prominence of the anterior communicating artery. This may  be secondary to tortuosity. Aneurysm cannot be entirely excluded.       Impression:      There is no evidence of acute infarction or hemorrhage. The  etiology for the patient's dizziness, weakness and unsteady gait is not  established. Further evaluation could be performed with an MRI  examination of the brain as indicated. There is mild prominence of the  anterior communicating artery which may be a function of tortuosity. An  aneurysm cannot be excluded. Further evaluation could be performed with  an MRA of the head.           Radiation dose reduction techniques were utilized, including automated  exposure control and exposure modulation based on body size.     This report was finalized on 1/12/2023 1:54 PM by Dr. Channing Zapata M.D.       CT Angiogram Head [[id number]] Collected: 01/12/23 1021     Updated: 01/12/23 1021    Narrative:      CT ANGIOGRAM NECK AND HEAD WITH CONTRAST     HISTORY: Dizziness.     COMPARISON: CT head 01/12/2023.     Initially, a noncontrasted CT examination of the brain was performed.  There was no evidence of hemorrhage or of acute infarction.  Mild-to-moderate mucosal thickening involving the ethmoid air cells on  the right is noted. A CT angiogram of the neck and head was then  performed. Multiplanar as well as 3-dimensional  reconstructions were  generated.     The great vessels are arranged in a classic configuration. There is 0%  stenosis of the internal carotid arteries bilaterally using NASCET  criteria. Venous contamination limits evaluation of the CT angiogram of  the head. The proximal aspects of the middle cerebral arteries appear  unremarkable. 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 There is no evidence of aneurysm. Otherwise, the  proximal aspects of the anterior cerebral arteries appear unremarkable.     Both vertebral arteries were opacified. The left vertebral artery is  slightly larger than that of the right. The basilar artery and the  proximal aspects of the posterior cerebral arteries appear unremarkable.       Impression:      There is no evidence of acute infarction or hemorrhage. 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 The  etiology for the patient's dizziness is not established. Further  evaluation could be performed with an MRI examination of the brain.     CHECK CHECK      NOTE: This is a preliminary report. The 3-dimensional reconstructions  are not yet available for review.           Radiation dose reduction techniques were utilized, including automated  exposure control and exposure modulation based on body size.          CT Angiogram Neck [[id number]] Collected: 01/12/23 1021     Updated: 01/12/23 1021    Narrative:      CT ANGIOGRAM NECK AND HEAD WITH CONTRAST     HISTORY: Dizziness.     COMPARISON: CT head 01/12/2023.     Initially, a noncontrasted CT examination of the brain was performed.  There was no evidence of hemorrhage or of acute infarction.  Mild-to-moderate mucosal thickening involving the ethmoid air cells on  the right is noted. A CT angiogram of the neck and head was then  performed. Multiplanar as well as 3-dimensional reconstructions were  generated.     The great vessels are arranged in a classic configuration. There is  0%  stenosis of the internal carotid arteries bilaterally using NASCET  criteria. Venous contamination limits evaluation of the CT angiogram of  the head. The proximal aspects of the middle cerebral arteries appear  unremarkable. 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 There is no evidence of aneurysm. Otherwise, the  proximal aspects of the anterior cerebral arteries appear unremarkable.     Both vertebral arteries were opacified. The left vertebral artery is  slightly larger than that of the right. The basilar artery and the  proximal aspects of the posterior cerebral arteries appear unremarkable.       Impression:      There is no evidence of acute infarction or hemorrhage. 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 The  etiology for the patient's dizziness is not established. Further  evaluation could be performed with an MRI examination of the brain.     CHECK CHECK      NOTE: This is a preliminary report. The 3-dimensional reconstructions  are not yet available for review.           Radiation dose reduction techniques were utilized, including automated  exposure control and exposure modulation based on body size.          XR Chest 2 View [[id number]] Collected: 01/12/23 0421     Updated: 01/12/23 0425    Narrative:      PA AND LATERAL CHEST RADIOGRAPH     HISTORY: Chest pain     COMPARISON: None available.     FINDINGS:  Heart size is within normal limits. No pneumothorax, pleural effusion,  or acute infiltrate is seen. There is some mild elevation of the right  hemidiaphragm.       Impression:      No acute findings.     This report was finalized on 1/12/2023 4:22 AM by Dr. Alaina Blunt M.D.             I have reviewed the medications.  ---------------------------------------------------------------------------------------------  Assessment & Plan   Assessment/Problem List    Dizziness      Plan:    Dizziness  -CT head negative acute  -CTAs  pending, preliminary read notes no evidence of acute infarction or hemorrhage with a duplicated/fenestrated anterior communicating artery appreciated.  -MRI of the brain without negative acute  -Echocardiogram normal  -TSH elevated with a normal T4 T3.  -Lipid panel and A1c unremarkable  -Neurology consulted, no further work-up needed    Epigastric/chest pain  -Patient noted some increase work of breathing as well as checked his heart rate with an O2 monitor at home and it was noted at 51 bpm.  -Troponin negative x2  -EKG no acute ischemic changes  -Chest x-ray negative for acute findings  -Echocardiogram normal  -Aspirin given in ER  -Cardiology consulted, plan for outpatient stress test and follow-up       History of DVT  -Patient is not on anticoagulation  -SCDs ordered     Osteoporosis  -No acute issues  -Continue vitamin D    Disposition: Home    Follow-up after Discharge: Cardiology, PCP    This note will serve as a discharge summary.      41 minutes have been spent by [facility] providers in the care of this patient while under observation status.      I wore an face mask, eye protection, and gloves during this patient encounter. Patient also wearing a surgical mask. Hand hygeine performed before and after seeing the patient.      Fatimah Donahue PA-C 01/12/23 16:24 EST

## 2023-01-12 NOTE — ED PROVIDER NOTES
EMERGENCY DEPARTMENT ENCOUNTER    Room Number:  10/10  Date of encounter:  1/12/2023  PCP: Antione Ivan MD  Patient Care Team:  Antione Ivan MD as PCP - General (Internal Medicine)  Hetal Butler APRN as Nurse Practitioner (Nurse Practitioner)   Independent Historians: Patient    HPI:  Chief Complaint: Dizziness, chest pain  A complete HPI/ROS/PMH/PSH/SH/FH are unobtainable due to: None    Chronic or social conditions impacting patient care (social determinants of health): None    Context: Kendall Her is a 77 y.o. male who presents to the ED c/o chest pain which awoke him from sleep around midnight.  Patient states that he has had prior history of reflux states that this felt somewhat like the reflux states he took some Tums and Prilosec without improvement.  Patient then became dizzy.  Denies any rotational sensation states that he felt very nauseous and off balance.  States he induced vomiting x1 which did not improve the way he felt.  Patient then came to the ED.  While in triage patient actively vomited.  Upon my evaluation patient states his dizziness is almost completely gone.  When he was feeling dizzy there was nothing that made the dizziness better or worse it was not related to posture head position opening or closing eyes.  Patient has never had similar dizziness in the past.  Patient's chest pain improved with nitro.    Review of prior external notes (non-ED): I reviewed discharge summary from 9/3/2021    Review of prior external test results outside of this encounter: I have reviewed patient's CMP from 11/29/2022, I have reviewed CBC from 11/29/2022    PAST MEDICAL HISTORY  Active Ambulatory Problems     Diagnosis Date Noted   • Degeneration of intervertebral disc of cervical region 06/28/2016   • Neck pain 06/28/2016   • Osteoporosis 06/28/2016   • Vitamin D deficiency 03/02/2017   • Diet-controlled hyperlipidemia 01/08/2019   • Nocturnal leg cramps 01/08/2019   • DVT of lower  limb, acute (HCC) 08/25/2020   • Lumbar disc disease with radiculopathy 08/25/2020   • Acute leg pain, right 07/24/2020   • TSH elevation 01/21/2021   • PSA elevation 01/21/2021   • Rectal bleeding 09/02/2021   • Hematuria 09/02/2021   • Hx of prostate biopsy 09/02/2021   • Hypokalemia 09/02/2021   • Syncope 09/02/2021   • History of deep vein thrombosis (DVT) of lower extremity 09/02/2021   • Anticoagulated 09/02/2021     Resolved Ambulatory Problems     Diagnosis Date Noted   • Osteopenia 08/25/2016   • Calcium blood increased 03/02/2017   • Elevated ALT measurement 03/02/2017   • 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    PAST SURGICAL HISTORY  Past Surgical History:   Procedure Laterality Date   • PARATHYROIDECTOMY N/A 4/19/2019    Procedure: right superior parathyroid and left superior parathyroid resection.;  Surgeon: Milagro Gordon MD;  Location: [facility] OR;  Service: General   • TONSILLECTOMY     • TUMOR REMOVAL      AS A CHILD UNDER BREAST         FAMILY HISTORY  Family History   Problem Relation Age of Onset   • Alzheimer's disease Mother    • Alzheimer's disease Father    • Malig Hyperthermia Neg Hx          SOCIAL HISTORY  Social History     Socioeconomic History   • Marital status:    Tobacco Use   • Smoking status: Never   • Smokeless tobacco: Never   Vaping Use   • Vaping Use: Never used   Substance and Sexual Activity   • Alcohol use: Yes     Alcohol/week: 5.0 standard drinks     Types: 5 Cans of beer per week     Comment: weekly   • Drug use: No   • Sexual activity: Defer         ALLERGIES  Peanut-containing drug products        REVIEW OF SYSTEMS  Review of Systems     All systems reviewed and negative except for  those discussed in HPI.       PHYSICAL EXAM    I have reviewed the triage vital signs and nursing notes.    ED Triage Vitals [01/12/23 0355]   Temp Heart Rate Resp BP SpO2   97.8 °F (36.6 °C) 88 18 152/80 96 %      Temp src Heart Rate Source Patient Position BP Location FiO2 (%)   Tympanic Monitor Lying Left arm --       Physical Exam  GENERAL: alert, no acute distress  SKIN: Warm, dry  HENT: Normocephalic, atraumatic  EYES: no scleral icterus  CV: regular rhythm, regular rate  RESPIRATORY: normal effort, lungs clear  ABDOMEN: soft, nontender, nondistended  MUSCULOSKELETAL: no deformity  NEURO: alert, moves all extremities, follows commands, 5 out of 5 strength upper and lower extremity, coordination intact, speech is fluent and clear, cranial nerves intact          LAB RESULTS  Recent Results (from the past 24 hour(s))   ECG 12 Lead ED Triage Standing Order; Chest Pain    Collection Time: 01/12/23  3:52 AM   Result Value Ref Range    QT Interval 387 ms   Comprehensive Metabolic Panel    Collection Time: 01/12/23  4:08 AM    Specimen: Blood   Result Value Ref Range    Glucose 166 (H) 65 - 99 mg/dL    BUN 13 8 - 23 mg/dL    Creatinine 0.91 0.76 - 1.27 mg/dL    Sodium 141 136 - 145 mmol/L    Potassium 3.5 3.5 - 5.2 mmol/L    Chloride 104 98 - 107 mmol/L    CO2 25.2 22.0 - 29.0 mmol/L    Calcium 8.8 8.6 - 10.5 mg/dL    Total Protein 6.2 6.0 - 8.5 g/dL    Albumin 4.1 3.5 - 5.2 g/dL    ALT (SGPT) 26 1 - 41 U/L    AST (SGOT) 26 1 - 40 U/L    Alkaline Phosphatase 57 39 - 117 U/L    Total Bilirubin 0.4 0.0 - 1.2 mg/dL    Globulin 2.1 gm/dL    A/G Ratio 2.0 g/dL    BUN/Creatinine Ratio 14.3 7.0 - 25.0    Anion Gap 11.8 5.0 - 15.0 mmol/L    eGFR 87.3 >60.0 mL/min/1.73   Troponin    Collection Time: 01/12/23  4:08 AM    Specimen: Blood   Result Value Ref Range    Troponin T <0.010 0.000 - 0.030 ng/mL   Green Top (Gel)    Collection Time: 01/12/23  4:08 AM   Result Value Ref Range    Extra Tube Hold for add-ons.    Lavender Top     Collection Time: 01/12/23  4:08 AM   Result Value Ref Range    Extra Tube hold for add-on    Light Blue Top    Collection Time: 01/12/23  4:08 AM   Result Value Ref Range    Extra Tube Hold for add-ons.    CBC Auto Differential    Collection Time: 01/12/23  4:08 AM    Specimen: Blood   Result Value Ref Range    WBC 10.96 (H) 3.40 - 10.80 10*3/mm3    RBC 5.09 4.14 - 5.80 10*6/mm3    Hemoglobin 14.9 13.0 - 17.7 g/dL    Hematocrit 44.7 37.5 - 51.0 %    MCV 87.8 79.0 - 97.0 fL    MCH 29.3 26.6 - 33.0 pg    MCHC 33.3 31.5 - 35.7 g/dL    RDW 13.1 12.3 - 15.4 %    RDW-SD 42.4 37.0 - 54.0 fl    MPV 10.1 6.0 - 12.0 fL    Platelets 196 140 - 450 10*3/mm3    Neutrophil % 86.9 (H) 42.7 - 76.0 %    Lymphocyte % 8.5 (L) 19.6 - 45.3 %    Monocyte % 3.4 (L) 5.0 - 12.0 %    Eosinophil % 0.4 0.3 - 6.2 %    Basophil % 0.5 0.0 - 1.5 %    Immature Grans % 0.3 0.0 - 0.5 %    Neutrophils, Absolute 9.54 (H) 1.70 - 7.00 10*3/mm3    Lymphocytes, Absolute 0.93 0.70 - 3.10 10*3/mm3    Monocytes, Absolute 0.37 0.10 - 0.90 10*3/mm3    Eosinophils, Absolute 0.04 0.00 - 0.40 10*3/mm3    Basophils, Absolute 0.05 0.00 - 0.20 10*3/mm3    Immature Grans, Absolute 0.03 0.00 - 0.05 10*3/mm3    nRBC 0.0 0.0 - 0.2 /100 WBC       Ordered the above labs and independently reviewed the results.        RADIOLOGY  XR Chest 2 View    Result Date: 1/12/2023  PA AND LATERAL CHEST RADIOGRAPH  HISTORY: Chest pain  COMPARISON: None available.  FINDINGS: Heart size is within normal limits. No pneumothorax, pleural effusion, or acute infiltrate is seen. There is some mild elevation of the right hemidiaphragm.      No acute findings.  This report was finalized on 1/12/2023 4:22 AM by Dr. Alaina Blunt M.D.      CT Head Without Contrast    Result Date: 1/12/2023  CT HEAD WITHOUT CONTRAST  HISTORY: Dizziness.  COMPARISON: None.  FINDINGS: The brain and ventricles are symmetrical. There is no evidence of hemorrhage, hydrocephalus or of abnormal extraaxial fluid. No  focal area of decreased attenuation to suggest acute infarction is identified. There is mild prominence of the anterior communicating artery. This may be secondary to tortuosity. Aneurysm cannot be entirely excluded.      There is no evidence of acute infarction or hemorrhage. The etiology for the patient's dizziness, weakness and unsteady gait is not established. Further evaluation could be performed with an MRI examination of the brain as indicated. There is mild prominence of the anterior communicating artery which may be a function of tortuosity. An aneurysm cannot be excluded. Further evaluation could be performed with an MRA of the head.    Radiation dose reduction techniques were utilized, including automated exposure control and exposure modulation based on body size.         I ordered the above noted radiological studies. Reviewed by me and discussed with radiologist.  See dictation for official radiology interpretation.      PROCEDURES    Procedures      MEDICATIONS GIVEN IN ER    Medications   sodium chloride 0.9 % flush 10 mL (has no administration in time range)   nitroglycerin (NITROSTAT) SL tablet 0.4 mg (0.4 mg Sublingual Given 1/12/23 0502)   sodium chloride 0.9 % flush 10 mL (has no administration in time range)   sodium chloride 0.9 % flush 10 mL (has no administration in time range)   sodium chloride 0.9 % infusion 40 mL (has no administration in time range)   ondansetron (ZOFRAN) tablet 4 mg (has no administration in time range)     Or   ondansetron (ZOFRAN) injection 4 mg (has no administration in time range)   acetaminophen (TYLENOL) tablet 650 mg (has no administration in time range)   melatonin tablet 5 mg (has no administration in time range)   aspirin tablet 325 mg (325 mg Oral Given 1/12/23 0501)         PROGRESS, DATA ANALYSIS, CONSULTS, AND MEDICAL DECISION MAKING    All labs have been independently reviewed by me.  All radiology studies have been reviewed by me and discussed with  radiologist dictating the report.   EKG's independently viewed and interpreted by me.  Discussion below represents my analysis of pertinent findings related to patient's condition, differential diagnosis, treatment plan and final disposition.    Differential diagnosis includes but is not limited to stroke, vertigo, ACS, TIA, MS, near-syncope, PE, atypical chest pain.    ED Course as of 01/12/23 0730   Thu Jan 12, 2023   9757 Discussed with Dr. Dang, will obtain CT without anticipate admission to observation get CTA and MRI. [TJ]   0700 Reevaluation: Patient's symptoms largely resolved.  Patient states he has just a tiny amount of residual chest pain.  States the dizziness is gone.  I have discussed results and advised that he should be admitted for TIA work-up and cardiology consult.  Patient is agreeable with this plan. [TJ]   0722 Discussed with observation unit.  They will accept the patient. [TJ]   0723 HEART SCORE:    History #1  (Highly suspicious 2, Moderately suspicious 1, Slightly or non-suspicious 0)    ECG #0  (Significant ST depression 2,  Nonspecific repol disturbance 1, Normal 0)    Age #2  (> or = 65 2, 46-65 1,  < or = 45 0)    Risk factors #1  (hypercholesterolemia, HTN, DM, smoking, pos fam hx, obesity)  (> or = to 3 RF 2, 1 or 2 1, No risk factors 0)    Troponin #0  (> or = 3x normal limit 2, 1-3x normal limit 1, < or = Normal limit 0)    HEART Score Key:  Scores 0-3: 0.9-1.7% risk of adverse cardiac event. In the HEART Score study, these patients were discharged (0.99% in the retrospective study, 1.7% in the prospective study)  Scores 4-6: 12-16.6% risk of adverse cardiac event. In the HEART Score study, these patients were admitted to the hospital. (11.6% retrospective, 16.6% prospective)  Scores =7: 50-65% risk of adverse cardiac event. In the HEART Score study, these patients were candidates for early invasive measures. (65.2% retrospective, 50.1% prospective)      This patient's HEART  score is 4     [TJ]      ED Course User Index  [TJ] Jonny Britton MD           PPE: The patient wore a mask and I wore an N95 mask throughout the entire patient encounter.       AS OF 07:30 EST VITALS:    BP - 111/56  HR - 66  TEMP - 97.8 °F (36.6 °C) (Tympanic)  O2 SATS - 96%        DIAGNOSIS  Final diagnoses:   Dizziness   Chest pain, unspecified type         DISPOSITION  ED Disposition     ED Disposition   Decision to Admit    Condition   --    Comment   --                Note Disclaimer: At [facility], we believe that sharing information builds trust and better relationships. You are receiving this note because you recently visited [facility]. It is possible you will see health information before a provider has talked with you about it. This kind of information can be easy to misunderstand. To help you fully understand what it means for your health, we urge you to discuss this note with your provider.       Jonny Britton MD  01/12/23 3958

## 2023-01-12 NOTE — OUTREACH NOTE
Prep Survey    Flowsheet Row Responses   [facility] patient discharged from? [place]   Is LACE score < 7 ? Yes   Eligibility [facility]   Date of Admission 01/12/23   Date of Discharge 01/12/23   Discharge Disposition Home or Self Care   Discharge diagnosis Dizziness,   Epigastric/chest pain   Does the patient have one of the following disease processes/diagnoses(primary or secondary)? Other   Does the patient have Home health ordered? No   Is there a DME ordered? No   Prep survey completed? Yes          MALINI RDZ - Registered Nurse

## 2023-01-12 NOTE — CONSULTS
"Date of Hospital Visit: 2023  Encounter Provider: Shalonda Perez RN  Place of Service: [facility] CARDIOLOGY  Patient Name: Kendall Her  :1945  Referral Provider: Cal Lai MD    Chief complaint dizziness and chest pain    History of Present Illness  Kendall Her is a 77 year old pt with a history of parathyroid adenoma, DVT and acid reflux.       Pt presented to ER on 23 with complaints of lower sternal versus epigastric discomfort which he describes as \"a knot in his chest\" that woke him up around midnight and then he became dizzy. Pt felt like he had reflux and took some Tums and Prilosec without improvement. Pt felt off balance and nauseated. He induced vomiting with out improvement. His discomfort improved after vomiting in the ER. In ER, troponin negative, EKG showed no acute ischemic changes, WBC 10.96, CXR negative for acute, CT of head negative for acute, Pt admitted for TIA workup.  He is a fairly active gentleman at home with no previous episodes of chest pain.  No orthopnea, PND or edema.  No palpitations or syncope.  No family history of coronary artery disease.  He is a lifelong non-smoker.    HPI was reviewed, updated and amended when necessary.    ECHO 1994    Past Medical History:   Diagnosis Date   • Acid reflux    • Colon cancer screening 2019    Cologuard testing: Negative results   • DVT (deep venous thrombosis) (HCC)    • Neck pain    • Osteopenia     lumbar spine   • Osteoporosis     left femoral neck   • Parathyroid adenoma    • Shoulder pain, left        Past Surgical History:   Procedure Laterality Date   • PARATHYROIDECTOMY N/A 2019    Procedure: right superior parathyroid and left superior parathyroid resection.;  Surgeon: Milagro Gordon MD;  Location: [facility];  Service: General   • TONSILLECTOMY     • TUMOR REMOVAL      AS A CHILD UNDER BREAST       Medications Prior to Admission   Medication Sig Dispense " Refill Last Dose   • cholecalciferol (VITAMIN D3) 25 MCG (1000 UT) tablet Take 2,000 Units by mouth Daily.   1/11/2023 at 0800       Current Meds  Scheduled Meds:sodium chloride, 10 mL, Intravenous, Q12H      Continuous Infusions:   PRN Meds:.•  acetaminophen  •  melatonin  •  nitroglycerin  •  ondansetron **OR** ondansetron  •  sodium chloride  •  sodium chloride  •  sodium chloride    Allergies as of 01/12/2023 - Reviewed 01/12/2023   Allergen Reaction Noted   • Peanut-containing drug products Swelling 02/18/2019       Social History     Socioeconomic History   • Marital status:    Tobacco Use   • Smoking status: Never   • Smokeless tobacco: Never   Vaping Use   • Vaping Use: Never used   Substance and Sexual Activity   • Alcohol use: Yes     Alcohol/week: 5.0 standard drinks     Types: 5 Cans of beer per week     Comment: weekly   • Drug use: No   • Sexual activity: Defer       Family History   Problem Relation Age of Onset   • Alzheimer's disease Mother    • Alzheimer's disease Father    • Malig Hyperthermia Neg Hx      Past surgical, medical, social and family history was reviewed, updated and amended when necessary.    Review of Systems   Constitutional: Negative for chills and fever.   HENT: Negative for hoarse voice and sore throat.    Eyes: Negative for double vision and photophobia.   Cardiovascular: Positive for chest pain. Negative for leg swelling, near-syncope, orthopnea, palpitations, paroxysmal nocturnal dyspnea and syncope.   Respiratory: Negative for cough and wheezing.    Skin: Negative for poor wound healing and rash.   Musculoskeletal: Negative for arthritis and joint swelling.   Gastrointestinal: Negative for bloating, abdominal pain, hematemesis and hematochezia.   Neurological: Positive for dizziness. Negative for focal weakness.   Psychiatric/Behavioral: Negative for depression and suicidal ideas.            Objective:   Temp:  [97.8 °F (36.6 °C)-98.7 °F (37.1 °C)] 98.7 °F (37.1  "°C)  Heart Rate:  [61-88] 85  Resp:  [16-18] 18  BP: (101-152)/(52-80) 101/59  Body mass index is 22.65 kg/m².  Flowsheet Rows    Flowsheet Row First Filed Value   Admission Height 182.9 cm (72\") Documented at 01/12/2023 0355   Admission Weight 77.1 kg (170 lb) Documented at 01/12/2023 0355        Vitals:    01/12/23 0916   BP: 101/59   Pulse: 85   Resp: 18   Temp: 98.7 °F (37.1 °C)   SpO2: 98%       Vitals reviewed.   Constitutional:       Appearance: Healthy appearance. Not in distress.   Neck:      Vascular: No JVR. JVD normal.   Pulmonary:      Effort: Pulmonary effort is normal.      Breath sounds: Normal breath sounds. No wheezing. No rhonchi. No rales.   Chest:      Chest wall: Not tender to palpatation.   Cardiovascular:      PMI at left midclavicular line. Normal rate. Regular rhythm. Normal S1. Normal S2.      Murmurs: There is no murmur.      No gallop. No click. No rub.   Pulses:     Intact distal pulses.   Edema:     Peripheral edema absent.   Abdominal:      General: Bowel sounds are normal.      Palpations: Abdomen is soft.      Tenderness: There is no abdominal tenderness.   Musculoskeletal: Normal range of motion.         General: No tenderness. Skin:     General: Skin is warm and dry.   Neurological:      General: No focal deficit present.      Mental Status: Alert and oriented to person, place and time.                 Lab Review:      Results from last 7 days   Lab Units 01/12/23  0408   SODIUM mmol/L 141   POTASSIUM mmol/L 3.5   CHLORIDE mmol/L 104   CO2 mmol/L 25.2   BUN mg/dL 13   CREATININE mg/dL 0.91   CALCIUM mg/dL 8.8   BILIRUBIN mg/dL 0.4   ALK PHOS U/L 57   ALT (SGPT) U/L 26   AST (SGOT) U/L 26   GLUCOSE mg/dL 166*     Results from last 7 days   Lab Units 01/12/23  0638 01/12/23  0408   TROPONIN T ng/mL <0.010 <0.010     @LABRCNTbnp@  Results from last 7 days   Lab Units 01/12/23  0408   WBC 10*3/mm3 10.96*   HEMOGLOBIN g/dL 14.9   HEMATOCRIT % 44.7   PLATELETS 10*3/mm3 196           "   @LABNTIP(chol,trig,hdl,ldl)            I personally viewed and interpreted the patient's EKG/Telemetry data  )  Patient Active Problem List   Diagnosis   • Degeneration of intervertebral disc of cervical region   • Neck pain   • Osteoporosis   • Vitamin D deficiency   • Diet-controlled hyperlipidemia   • Nocturnal leg cramps   • DVT of lower limb, acute (HCC)   • Lumbar disc disease with radiculopathy   • Acute leg pain, right   • TSH elevation   • PSA elevation   • Rectal bleeding   • Hematuria   • Hx of prostate biopsy   • Hypokalemia   • Syncope   • History of deep vein thrombosis (DVT) of lower extremity   • Anticoagulated   • Dizziness     Assessment and Plan:    1. Atypical chest pain -troponin negative without ischemic EKG changes.  Echocardiogram shows normal EF with no regional wall motion abnormalities and no significant valvular heart disease.  We discussed the need for stress study but he would prefer to have this as outpatient he has been chest pain-free for the last 10 hours.  I will set him up for stress study and clinic visit next week.  He knows to return to the ER should he experience recurrent chest pain.  2. Sick euthyroid  3. History of DVT -he is neither tachycardic nor hypoxic.  No evidence of RV strain on echo.  Low suspicion for PE.    Jose Martin Galvan MD  01/12/23  09:32 EST.  Time spent in reviewing chart, discussion and examination:

## 2023-01-12 NOTE — H&P
[facility]   HISTORY AND PHYSICAL    Patient Name: Kendall Her  : 1945  MRN: [id number]  Primary Care Physician:  Antione Ivan MD  Date of admission: 2023    Subjective   Subjective     Chief Complaint:   Chief Complaint   Patient presents with   • Chest Pain   • Vomiting   • Dizziness         HPI:    Kendall Her is a 77 y.o. male with a history of DVT and osteoporosis who presents to [facility] ER with chest pain and dizziness.  Patient states he woke up today around 12:30 AM with epigastric discomfort that he states felt like a tightness and a dull pain.  He reports he became nauseous and dizzy shortly afterwards.  He states he did have some lightheadedness did not feel he was going to pass out.  He denies any room spinning sensation states he felt more off balance.  He reports he went to the bathroom to try to make himself vomit and was successful but did not feel better afterwards.  He denies any sort of headache or visual changes such as blurred or double vision.  He denies numbness and tingling or focal weakness.  He denies palpitations.  He does report with the epigastric tightness he noticed he was panting more but did not have any overt shortness of breath.  He also reports he took his heart rate with an O2 monitor and it was noted at 51 bpm.  He states he has never had a low heart rate before.  He denies palpitations.  Reports he has been feeling well up to this point.  Denies any recent fevers or chills.  Denies cough and sore throat.  He denies nicotine and recent alcohol use.    In the ER, patient reports improvement of symptoms after been given nitroglycerin.  A head CT without showed no evidence of acute infarction or hemorrhage.  Chest x-ray was negative for acute findings.  Initial troponin was negative and EKG noted with no acute ischemic changes.  Patient was given aspirin in the ER.  ER provider spoke with neurology who agreed with further evaluation  for TIA.    Review of Systems   All systems were reviewed and negative except for: what is mentioned above in the HPI.     Personal History     Past Medical History:   Diagnosis Date   • Acid reflux    • Colon cancer screening 01/14/2019    Cologuard testing: Negative results   • DVT (deep venous thrombosis) (HCC)    • Neck pain    • Osteopenia     lumbar spine   • Osteoporosis     left femoral neck   • Parathyroid adenoma    • Shoulder pain, left        Past Surgical History:   Procedure Laterality Date   • PARATHYROIDECTOMY N/A 4/19/2019    Procedure: right superior parathyroid and left superior parathyroid resection.;  Surgeon: Milagro Gordon MD;  Location: [facility];  Service: General   • TONSILLECTOMY     • TUMOR REMOVAL      AS A CHILD UNDER BREAST       Family History: family history includes Alzheimer's disease in his father and mother. Otherwise pertinent FHx was reviewed and not pertinent to current issue.    Social History:  reports that he has never smoked. He has never used smokeless tobacco. He reports current alcohol use of about 5.0 standard drinks per week. He reports that he does not use drugs.    Home Medications:  amoxicillin-clavulanate, cholecalciferol, and fluticasone    Allergies:  Allergies   Allergen Reactions   • Peanut-Containing Drug Products Swelling     Patient states after eating some (more than a few) his throat seems to contract       Objective   Objective     Vitals:   Temp:  [97.8 °F (36.6 °C)] 97.8 °F (36.6 °C)  Heart Rate:  [61-88] 66  Resp:  [16-18] 16  BP: (105-152)/(56-80) 111/56  Physical Exam    Constitutional: 77-year-old male, well-nourished, no acute distress on room air   Eyes: PERRLA, sclerae anicteric, no conjunctival injection   HENT: NCAT, mucous membranes moist   Neck: Supple, no thyromegaly, no lymphadenopathy, trachea midline   Respiratory: Clear to auscultation bilaterally, nonlabored respirations    Cardiovascular: RRR, no murmurs, rubs, or gallops,  palpable pedal pulses bilaterally   Gastrointestinal: Positive bowel sounds, soft, nontender, nondistended   Musculoskeletal: No bilateral ankle edema, no clubbing or cyanosis to extremities   Psychiatric: Appropriate affect, cooperative   Neurologic: Oriented x 3, strength symmetric in all extremities, Cranial Nerves grossly intact to confrontation, speech clear   Skin: No rashes     Result Review    Result Review:  I have personally reviewed the results from the time of this admission to 1/12/2023 07:27 EST and agree with these findings:  [x]  Laboratory list / accordion  []  Microbiology  [x]  Radiology  [x]  EKG/Telemetry   []  Cardiology/Vascular   []  Pathology  []  Old records  []  Other:  Most notable findings include:     XR Chest 2 View    Result Date: 1/12/2023  PA AND LATERAL CHEST RADIOGRAPH  HISTORY: Chest pain  COMPARISON: None available.  FINDINGS: Heart size is within normal limits. No pneumothorax, pleural effusion, or acute infiltrate is seen. There is some mild elevation of the right hemidiaphragm.      No acute findings.  This report was finalized on 1/12/2023 4:22 AM by Dr. Alaina Blunt M.D.      CT Head Without Contrast    Result Date: 1/12/2023  CT HEAD WITHOUT CONTRAST  HISTORY: Dizziness.  COMPARISON: None.  FINDINGS: The brain and ventricles are symmetrical. There is no evidence of hemorrhage, hydrocephalus or of abnormal extraaxial fluid. No focal area of decreased attenuation to suggest acute infarction is identified. There is mild prominence of the anterior communicating artery. This may be secondary to tortuosity. Aneurysm cannot be entirely excluded.      There is no evidence of acute infarction or hemorrhage. The etiology for the patient's dizziness, weakness and unsteady gait is not established. Further evaluation could be performed with an MRI examination of the brain as indicated. There is mild prominence of the anterior communicating artery which may be a function of  tortuosity. An aneurysm cannot be excluded. Further evaluation could be performed with an MRA of the head.    Radiation dose reduction techniques were utilized, including automated exposure control and exposure modulation based on body size.           Assessment & Plan   Assessment / Plan     Brief Patient Summary:  Kendall Her is a 77 y.o. male who is being admitted the observation unit for further evaluation of dizziness and chest pain with plan for neuroimaging, echocardiogram, cardiology and neurology consultations.    Active Hospital Problems:  Active Hospital Problems    Diagnosis    • **Dizziness      Plan:     Dizziness  -CT head negative acute  -CTAs pending, preliminary read notes no evidence of acute infarction or hemorrhage with a duplicated/fenestrated anterior communicating artery appreciated.  -MRI of the brain without ordered  -Echocardiogram ordered  -Neurology consulted  -Neurochecks every 4 hours  -Aspirin  -TSH, lipid panel, A1c pending  -Start atorvastatin 40 mg nightly  -Telemetry monitoring  -Fall precautions  -PT consult    Epigastric/chest pain  -Patient noted some increase work of breathing as well as checked his heart rate with an O2 monitor at home and it was noted at 51 bpm.  -Troponin negative x2, trend  -EKG no acute ischemic changes  -Chest x-ray negative for acute findings  -Echocardiogram ordered  -Cardiology consulted  -Aspirin given in ER  -Nitro as needed for chest pain    History of DVT  -Patient is not on anticoagulation  -SCDs ordered    Osteoporosis  -No acute issues  -Continue vitamin D    DVT prophylaxis:  Mechanical DVT prophylaxis orders are present.    CODE STATUS:    Level Of Support Discussed With: Patient  Code Status (Patient has no pulse and is not breathing): No CPR (Do Not Attempt to Resuscitate)  Medical Interventions (Patient has pulse or is breathing): Full Support  Release to patient: Routine Release    Admission Status:  I believe this patient meets  observation status.    81 minutes have been spent by [facility] providers in the care of this patient while under observation status.    I have discussed plan of care with patient including advance care plan and/or surrogate decision maker.  Patient advises that their spouse, Kasey Her will be their primary surrogate decision maker    I wore an face mask, eye protection, and gloves during this patient encounter. Patient also wearing a surgical mask. Hand hygeine performed before and after seeing the patient.      Electronically signed by Fatimah Donahue PA-C, 01/12/23, 7:27 AM EST.

## 2023-01-13 ENCOUNTER — TRANSITIONAL CARE MANAGEMENT TELEPHONE ENCOUNTER (OUTPATIENT)
Dept: CALL CENTER | Facility: HOSPITAL | Age: 78
End: 2023-01-13
Payer: MEDICARE

## 2023-01-13 NOTE — OUTREACH NOTE
Call Center TCM Note    Flowsheet Row Responses   [facility] patient discharged from? [place]   Does the patient have one of the following disease processes/diagnoses(primary or secondary)? Other   TCM attempt successful? Yes  [No verbal release on file]   Call start time 1143   Call end time 1146   Discharge diagnosis Dizziness,   Epigastric/chest pain   Meds reviewed with patient/caregiver? Yes   Is the patient having any side effects they believe may be caused by any medication additions or changes? No   Does the patient have all medications ordered at discharge? N/A   Is the patient taking all medications as directed (includes completed medication regime)? Yes   Comments Patient has previously scheduled PCP appt on 2/2/23 that he wishes to keep   Does the patient have an appointment with their PCP within 7 days of discharge? No   Nursing Interventions Confirmed date/time of appointment, Routed TCM call to PCP office   Has home health visited the patient within 72 hours of discharge? N/A   Psychosocial issues? No   Did the patient receive a copy of their discharge instructions? Yes   Nursing interventions Reviewed instructions with patient   What is the patient's perception of their health status since discharge? Improving   Is the patient/caregiver able to teach back signs and symptoms related to disease process for when to call PCP? Yes   Is the patient/caregiver able to teach back signs and symptoms related to disease process for when to call 911? Yes   Is the patient/caregiver able to teach back the hierarchy of who to call/visit for symptoms/problems? PCP, Specialist, Home health nurse, Urgent Care, ED, 911 Yes   If the patient is a current smoker, are they able to teach back resources for cessation? Not a smoker   TCM call completed? Yes   Wrap up additional comments Patient doing well, denies any concerns   Call end time 1146   Would this patient benefit from a Referral to [facility] Social Work? No   Is  the patient interested in additional calls from an ambulatory ?  NOTE:  applies to high risk patients requiring additional follow-up. No          Jo Ann Fuller RN    1/13/2023, 11:47 EST

## 2023-01-18 ENCOUNTER — HOSPITAL ENCOUNTER (OUTPATIENT)
Dept: CARDIOLOGY | Facility: HOSPITAL | Age: 78
Discharge: HOME | End: 2023-01-18
Admitting: NURSE PRACTITIONER
Payer: MEDICARE

## 2023-01-18 VITALS — HEIGHT: 72 IN | BODY MASS INDEX: 22.69 KG/M2 | WEIGHT: 167.55 LBS

## 2023-01-18 DIAGNOSIS — R07.9 CHEST PAIN, UNSPECIFIED TYPE: ICD-10-CM

## 2023-01-18 LAB
BH CV NUCLEAR PRIOR STUDY: 2
BH CV REST NUCLEAR ISOTOPE DOSE: 10.6 MCI
BH CV STRESS BP STAGE 1: NORMAL
BH CV STRESS BP STAGE 2: NORMAL
BH CV STRESS DURATION MIN STAGE 1: 3
BH CV STRESS DURATION MIN STAGE 2: 3
BH CV STRESS DURATION MIN STAGE 3: 0
BH CV STRESS DURATION SEC STAGE 1: 0
BH CV STRESS DURATION SEC STAGE 2: 0
BH CV STRESS DURATION SEC STAGE 3: 46
BH CV STRESS GRADE STAGE 1: 10
BH CV STRESS GRADE STAGE 2: 12
BH CV STRESS GRADE STAGE 3: 14
BH CV STRESS HR STAGE 1: 103
BH CV STRESS HR STAGE 2: 124
BH CV STRESS HR STAGE 3: 140
BH CV STRESS METS STAGE 1: 5
BH CV STRESS METS STAGE 2: 7.5
BH CV STRESS METS STAGE 3: 8
BH CV STRESS NUCLEAR ISOTOPE DOSE: 33.9 MCI
BH CV STRESS PROTOCOL 1: NORMAL
BH CV STRESS RECOVERY BP: NORMAL MMHG
BH CV STRESS RECOVERY HR: 86 BPM
BH CV STRESS SPEED STAGE 1: 1.7
BH CV STRESS SPEED STAGE 2: 2.5
BH CV STRESS SPEED STAGE 3: 3.4
BH CV STRESS STAGE 1: 1
BH CV STRESS STAGE 2: 2
BH CV STRESS STAGE 3: 3
LV EF NUC BP: 59 %
MAXIMAL PREDICTED HEART RATE: 144 BPM
PERCENT MAX PREDICTED HR: 97.22 %
STRESS BASELINE BP: NORMAL MMHG
STRESS BASELINE HR: 72 BPM
STRESS PERCENT HR: 114 %
STRESS POST ESTIMATED WORKLOAD: 8 METS
STRESS POST EXERCISE DUR MIN: 6 MIN
STRESS POST EXERCISE DUR SEC: 46 SEC
STRESS POST PEAK BP: NORMAL MMHG
STRESS POST PEAK HR: 140 BPM
STRESS TARGET HR: 122 BPM

## 2023-01-18 PROCEDURE — A9502 TC99M TETROFOSMIN: HCPCS | Performed by: NURSE PRACTITIONER

## 2023-01-18 PROCEDURE — 0 TECHNETIUM TETROFOSMIN KIT: Performed by: NURSE PRACTITIONER

## 2023-01-18 PROCEDURE — 78452 HT MUSCLE IMAGE SPECT MULT: CPT | Performed by: INTERNAL MEDICINE

## 2023-01-18 PROCEDURE — 93017 CV STRESS TEST TRACING ONLY: CPT

## 2023-01-18 PROCEDURE — 93016 CV STRESS TEST SUPVJ ONLY: CPT | Performed by: INTERNAL MEDICINE

## 2023-01-18 PROCEDURE — 78452 HT MUSCLE IMAGE SPECT MULT: CPT

## 2023-01-18 PROCEDURE — 93018 CV STRESS TEST I&R ONLY: CPT | Performed by: INTERNAL MEDICINE

## 2023-01-18 RX ADMIN — TETROFOSMIN 1 DOSE: 1.38 INJECTION, POWDER, LYOPHILIZED, FOR SOLUTION INTRAVENOUS at 13:11

## 2023-01-18 RX ADMIN — TETROFOSMIN 1 DOSE: 1.38 INJECTION, POWDER, LYOPHILIZED, FOR SOLUTION INTRAVENOUS at 12:12

## 2023-01-27 LAB
ALBUMIN SERPL-MCNC: 4.7 G/DL (ref 3.5–5.2)
ALBUMIN/GLOB SERPL: 2 G/DL
ALP SERPL-CCNC: 67 U/L (ref 39–117)
ALT SERPL-CCNC: 18 U/L (ref 1–41)
AST SERPL-CCNC: 22 U/L (ref 1–40)
BASOPHILS # BLD AUTO: 0.07 10*3/MM3 (ref 0–0.2)
BASOPHILS NFR BLD AUTO: 0.9 % (ref 0–1.5)
BILIRUB SERPL-MCNC: 0.5 MG/DL (ref 0–1.2)
BUN SERPL-MCNC: 13 MG/DL (ref 8–23)
BUN/CREAT SERPL: 13.8 (ref 7–25)
CALCIUM SERPL-MCNC: 10.2 MG/DL (ref 8.6–10.5)
CHLORIDE SERPL-SCNC: 103 MMOL/L (ref 98–107)
CO2 SERPL-SCNC: 32.6 MMOL/L (ref 22–29)
CREAT SERPL-MCNC: 0.94 MG/DL (ref 0.76–1.27)
EGFRCR SERPLBLD CKD-EPI 2021: 84 ML/MIN/1.73
EOSINOPHIL # BLD AUTO: 0.41 10*3/MM3 (ref 0–0.4)
EOSINOPHIL NFR BLD AUTO: 5.3 % (ref 0.3–6.2)
ERYTHROCYTE [DISTWIDTH] IN BLOOD BY AUTOMATED COUNT: 12.6 % (ref 12.3–15.4)
GLOBULIN SER CALC-MCNC: 2.4 GM/DL
GLUCOSE SERPL-MCNC: 88 MG/DL (ref 65–99)
HCT VFR BLD AUTO: 48.5 % (ref 37.5–51)
HGB BLD-MCNC: 16.5 G/DL (ref 13–17.7)
IMM GRANULOCYTES # BLD AUTO: 0.02 10*3/MM3 (ref 0–0.05)
IMM GRANULOCYTES NFR BLD AUTO: 0.3 % (ref 0–0.5)
LYMPHOCYTES # BLD AUTO: 2.02 10*3/MM3 (ref 0.7–3.1)
LYMPHOCYTES NFR BLD AUTO: 25.9 % (ref 19.6–45.3)
MCH RBC QN AUTO: 29.3 PG (ref 26.6–33)
MCHC RBC AUTO-ENTMCNC: 34 G/DL (ref 31.5–35.7)
MCV RBC AUTO: 86.1 FL (ref 79–97)
MONOCYTES # BLD AUTO: 0.55 10*3/MM3 (ref 0.1–0.9)
MONOCYTES NFR BLD AUTO: 7.1 % (ref 5–12)
NEUTROPHILS # BLD AUTO: 4.73 10*3/MM3 (ref 1.7–7)
NEUTROPHILS NFR BLD AUTO: 60.5 % (ref 42.7–76)
NRBC BLD AUTO-RTO: 0 /100 WBC (ref 0–0.2)
PLATELET # BLD AUTO: 249 10*3/MM3 (ref 140–450)
POTASSIUM SERPL-SCNC: 4.8 MMOL/L (ref 3.5–5.2)
PROT SERPL-MCNC: 7.1 G/DL (ref 6–8.5)
PSA SERPL-MCNC: 13.1 NG/ML (ref 0–4)
RBC # BLD AUTO: 5.63 10*6/MM3 (ref 4.14–5.8)
SODIUM SERPL-SCNC: 145 MMOL/L (ref 136–145)
T4 FREE SERPL-MCNC: 1.15 NG/DL (ref 0.93–1.7)
TSH SERPL DL<=0.005 MIU/L-ACNC: 8.83 UIU/ML (ref 0.27–4.2)
WBC # BLD AUTO: 7.8 10*3/MM3 (ref 3.4–10.8)

## 2023-02-02 ENCOUNTER — OFFICE VISIT (OUTPATIENT)
Dept: FAMILY MEDICINE CLINIC | Facility: CLINIC | Age: 78
End: 2023-02-02
Payer: MEDICARE

## 2023-02-02 VITALS
HEIGHT: 72 IN | TEMPERATURE: 97.3 F | WEIGHT: 178.4 LBS | BODY MASS INDEX: 24.16 KG/M2 | SYSTOLIC BLOOD PRESSURE: 118 MMHG | OXYGEN SATURATION: 100 % | DIASTOLIC BLOOD PRESSURE: 72 MMHG | HEART RATE: 72 BPM | RESPIRATION RATE: 16 BRPM

## 2023-02-02 DIAGNOSIS — R79.89 TSH ELEVATION: ICD-10-CM

## 2023-02-02 DIAGNOSIS — E55.9 VITAMIN D DEFICIENCY: ICD-10-CM

## 2023-02-02 DIAGNOSIS — E78.5 DIET-CONTROLLED HYPERLIPIDEMIA: ICD-10-CM

## 2023-02-02 DIAGNOSIS — R97.20 PSA ELEVATION: Primary | ICD-10-CM

## 2023-02-02 PROBLEM — E87.6 HYPOKALEMIA: Status: RESOLVED | Noted: 2021-09-02 | Resolved: 2023-02-02

## 2023-02-02 PROCEDURE — 99214 OFFICE O/P EST MOD 30 MIN: CPT | Performed by: INTERNAL MEDICINE

## 2023-07-12 PROBLEM — S90.861A TICK BITE OF RIGHT FOOT: Status: ACTIVE | Noted: 2023-07-12
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
## 2023-08-05 LAB
ALBUMIN SERPL-MCNC: 4.4 G/DL (ref 3.5–5.2)
ALBUMIN/GLOB SERPL: 2.2 G/DL
ALP SERPL-CCNC: 70 U/L (ref 39–117)
ALT SERPL-CCNC: 23 U/L (ref 1–41)
AST SERPL-CCNC: 24 U/L (ref 1–40)
BASOPHILS # BLD AUTO: 0.07 10*3/MM3 (ref 0–0.2)
BASOPHILS NFR BLD AUTO: 1.2 % (ref 0–1.5)
BILIRUB SERPL-MCNC: 0.7 MG/DL (ref 0–1.2)
BUN SERPL-MCNC: 11 MG/DL (ref 8–23)
BUN/CREAT SERPL: 11.6 (ref 7–25)
CALCIUM SERPL-MCNC: 9.9 MG/DL (ref 8.6–10.5)
CHLORIDE SERPL-SCNC: 106 MMOL/L (ref 98–107)
CHOLEST SERPL-MCNC: 143 MG/DL (ref 0–200)
CO2 SERPL-SCNC: 30.8 MMOL/L (ref 22–29)
CREAT SERPL-MCNC: 0.95 MG/DL (ref 0.76–1.27)
EGFRCR SERPLBLD CKD-EPI 2021: 83 ML/MIN/1.73
EOSINOPHIL # BLD AUTO: 0.52 10*3/MM3 (ref 0–0.4)
EOSINOPHIL NFR BLD AUTO: 8.7 % (ref 0.3–6.2)
ERYTHROCYTE [DISTWIDTH] IN BLOOD BY AUTOMATED COUNT: 12.4 % (ref 12.3–15.4)
GLOBULIN SER CALC-MCNC: 2 GM/DL
GLUCOSE SERPL-MCNC: 65 MG/DL (ref 65–99)
HCT VFR BLD AUTO: 48.2 % (ref 37.5–51)
HDLC SERPL-MCNC: 61 MG/DL (ref 40–60)
HGB BLD-MCNC: 16.6 G/DL (ref 13–17.7)
IMM GRANULOCYTES # BLD AUTO: 0.01 10*3/MM3 (ref 0–0.05)
IMM GRANULOCYTES NFR BLD AUTO: 0.2 % (ref 0–0.5)
LDLC SERPL CALC-MCNC: 63 MG/DL (ref 0–100)
LDLC/HDLC SERPL: 0.99 {RATIO}
LYMPHOCYTES # BLD AUTO: 1.51 10*3/MM3 (ref 0.7–3.1)
LYMPHOCYTES NFR BLD AUTO: 25.3 % (ref 19.6–45.3)
MCH RBC QN AUTO: 29.5 PG (ref 26.6–33)
MCHC RBC AUTO-ENTMCNC: 34.4 G/DL (ref 31.5–35.7)
MCV RBC AUTO: 85.8 FL (ref 79–97)
MONOCYTES # BLD AUTO: 0.48 10*3/MM3 (ref 0.1–0.9)
MONOCYTES NFR BLD AUTO: 8 % (ref 5–12)
NEUTROPHILS # BLD AUTO: 3.39 10*3/MM3 (ref 1.7–7)
NEUTROPHILS NFR BLD AUTO: 56.6 % (ref 42.7–76)
NRBC BLD AUTO-RTO: 0 /100 WBC (ref 0–0.2)
PLATELET # BLD AUTO: 204 10*3/MM3 (ref 140–450)
POTASSIUM SERPL-SCNC: 4.7 MMOL/L (ref 3.5–5.2)
PROT SERPL-MCNC: 6.4 G/DL (ref 6–8.5)
PSA SERPL-MCNC: 11.9 NG/ML (ref 0–4)
RBC # BLD AUTO: 5.62 10*6/MM3 (ref 4.14–5.8)
SODIUM SERPL-SCNC: 144 MMOL/L (ref 136–145)
TRIGL SERPL-MCNC: 108 MG/DL (ref 0–150)
TSH SERPL DL<=0.005 MIU/L-ACNC: 6.76 UIU/ML (ref 0.27–4.2)
VLDLC SERPL CALC-MCNC: 19 MG/DL (ref 5–40)
WBC # BLD AUTO: 5.98 10*3/MM3 (ref 3.4–10.8)

## 2023-08-09 ENCOUNTER — OFFICE VISIT (OUTPATIENT)
Dept: FAMILY MEDICINE CLINIC | Facility: CLINIC | Age: 78
End: 2023-08-09
Payer: MEDICARE

## 2023-08-09 VITALS
TEMPERATURE: 96 F | HEART RATE: 68 BPM | WEIGHT: 176 LBS | DIASTOLIC BLOOD PRESSURE: 70 MMHG | HEIGHT: 72 IN | OXYGEN SATURATION: 100 % | SYSTOLIC BLOOD PRESSURE: 110 MMHG | BODY MASS INDEX: 23.84 KG/M2 | RESPIRATION RATE: 16 BRPM

## 2023-08-09 DIAGNOSIS — M50.30 DEGENERATION OF INTERVERTEBRAL DISC OF CERVICAL REGION: ICD-10-CM

## 2023-08-09 DIAGNOSIS — Z00.00 ROUTINE ADULT HEALTH MAINTENANCE: ICD-10-CM

## 2023-08-09 DIAGNOSIS — R97.20 PSA ELEVATION: Primary | ICD-10-CM

## 2023-08-09 DIAGNOSIS — E78.5 DIET-CONTROLLED HYPERLIPIDEMIA: ICD-10-CM

## 2023-08-09 DIAGNOSIS — E55.9 VITAMIN D DEFICIENCY: ICD-10-CM

## 2023-08-09 DIAGNOSIS — R79.89 TSH ELEVATION: ICD-10-CM

## 2023-08-09 NOTE — PROGRESS NOTES
Subjective   Kendall Her is a 77 y.o. male. Patient is here today for follow-up on his PSA elevation, vitamin D deficiency and hyperlipidemia.  He is generally feeling well and has no acute complaints.  Chief Complaint   Patient presents with    Vitamin D Deficiency          Vitals:    08/09/23 1008   BP: 110/70   Pulse: 68   Resp: 16   Temp: 96 øF (35.6 øC)   SpO2: 100%     Body mass index is 23.86 kg/mý.  The following portions of the patient's history were reviewed and updated as appropriate: allergies, current medications, past family history, past medical history, past social history, past surgical history and problem list.    Past Medical History:   Diagnosis Date    Acid reflux     Benign prostatic hyperplasia July 2021    Cancer Prostate cancer diagnosed 3/2022    Colon cancer screening 01/14/2019    Cologuard testing: Negative results    DVT (deep venous thrombosis)     Neck pain     Osteopenia     lumbar spine    Osteoporosis     left femoral neck    Parathyroid adenoma     Shoulder pain, left       No Known Allergies   Social History     Socioeconomic History    Marital status:    Tobacco Use    Smoking status: Never    Smokeless tobacco: Never   Vaping Use    Vaping Use: Never used   Substance and Sexual Activity    Alcohol use: Yes     Alcohol/week: 2.0 standard drinks     Types: 1 Cans of beer, 1 Drinks containing 0.5 oz of alcohol per week     Comment: low interest in alcoholic beverages    Drug use: No    Sexual activity: Not Currently        Current Outpatient Medications:     cholecalciferol (VITAMIN D3) 25 MCG (1000 UT) tablet, Take 2 tablets by mouth Daily., Disp: , Rfl:      Objective     History of Present Illness     Review of Systems    Physical Exam  Vitals and nursing note reviewed.   Constitutional:       General: He is not in acute distress.     Appearance: Normal appearance. He is not ill-appearing.   HENT:      Head: Normocephalic and atraumatic.   Cardiovascular:      Rate  and Rhythm: Normal rate and regular rhythm.      Heart sounds: Normal heart sounds.   Pulmonary:      Effort: Pulmonary effort is normal. No respiratory distress.      Breath sounds: Normal breath sounds. No wheezing or rales.   Skin:     General: Skin is warm and dry.   Neurological:      General: No focal deficit present.      Mental Status: He is alert and oriented to person, place, and time.   Psychiatric:         Mood and Affect: Mood normal.         Behavior: Behavior normal.       Assessment    ASSESSMENT CBC was normal.  CMP was essentially normal.  TSH is elevated but improved at 6.76 and clinically the patient's euthyroid.  Lipid panel has total cholesterol 143, HDL 61 and LDL 63.  PSA was elevated but slightly lower at 11.9 and he has upcoming urology appointment  #1-history of hyperlipidemia, controlled by diet   #2-history of vitamin D deficiency  #3-PSA elevation  #4-elevated TSH, clinically euthyroid      Problems Addressed this Visit          Cardiac and Vasculature    Diet-controlled hyperlipidemia    Relevant Orders    Comprehensive Metabolic Panel (Completed)    Lipid Panel With LDL / HDL Ratio (Completed)       Endocrine and Metabolic    Vitamin D deficiency       Genitourinary and Reproductive     PSA elevation - Primary    Relevant Orders    PSA DIAGNOSTIC (Completed)       Neuro    Degeneration of intervertebral disc of cervical region       Symptoms and Signs    TSH elevation    Relevant Orders    CBC & Differential (Completed)     Other Visit Diagnoses       Routine adult health maintenance        Relevant Orders    TSH (Completed)    PSA DIAGNOSTIC (Completed)          Diagnoses         Codes Comments    PSA elevation    -  Primary ICD-10-CM: R97.20  ICD-9-CM: 790.93     TSH elevation     ICD-10-CM: R79.89  ICD-9-CM: 794.5     Diet-controlled hyperlipidemia     ICD-10-CM: E78.5  ICD-9-CM: 272.4     Routine adult health maintenance     ICD-10-CM: Z00.00  ICD-9-CM: V70.0     Vitamin D  deficiency     ICD-10-CM: E55.9  ICD-9-CM: 268.9     Degeneration of intervertebral disc of cervical region     ICD-10-CM: M50.30  ICD-9-CM: 722.4             PLAN the patient can be rechecked in 6 months with a CMP, TSH and free T4, lipid panel, PSA diagnostic and vitamin D level and should also have a bone density test    There are no Patient Instructions on file for this visit.  Return in about 6 months (around 2/9/2024) for with labs.

## 2023-09-01 ENCOUNTER — OFFICE VISIT (OUTPATIENT)
Dept: FAMILY MEDICINE CLINIC | Facility: CLINIC | Age: 78
End: 2023-09-01
Payer: MEDICARE

## 2023-09-01 VITALS
SYSTOLIC BLOOD PRESSURE: 114 MMHG | DIASTOLIC BLOOD PRESSURE: 70 MMHG | HEART RATE: 73 BPM | HEIGHT: 72 IN | OXYGEN SATURATION: 96 % | WEIGHT: 176.3 LBS | BODY MASS INDEX: 23.88 KG/M2

## 2023-09-01 DIAGNOSIS — Z71.82 EXERCISE COUNSELING: ICD-10-CM

## 2023-09-01 DIAGNOSIS — C61 PROSTATE CANCER: Primary | ICD-10-CM

## 2023-09-01 DIAGNOSIS — Z71.85 IMMUNIZATION COUNSELING: ICD-10-CM

## 2023-09-01 PROBLEM — M79.604 ACUTE LEG PAIN, RIGHT: Status: RESOLVED | Noted: 2020-07-24 | Resolved: 2023-09-01

## 2023-09-01 PROBLEM — R55 SYNCOPE: Status: RESOLVED | Noted: 2021-09-02 | Resolved: 2023-09-01

## 2023-09-01 PROBLEM — S90.861A TICK BITE OF RIGHT FOOT: Status: RESOLVED | Noted: 2023-07-12 | Resolved: 2023-09-01

## 2023-09-01 PROBLEM — R42 DIZZINESS: Status: RESOLVED | Noted: 2023-01-12 | Resolved: 2023-09-01
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
## 2023-09-01 PROBLEM — Z98.890 HX OF PROSTATE BIOPSY: Status: RESOLVED | Noted: 2021-09-02 | Resolved: 2023-09-01

## 2023-09-01 PROBLEM — R97.20 PSA ELEVATION: Status: RESOLVED | Noted: 2021-01-21 | Resolved: 2023-09-01

## 2023-09-01 PROCEDURE — 1159F MED LIST DOCD IN RCRD: CPT | Performed by: INTERNAL MEDICINE

## 2023-09-01 PROCEDURE — 1160F RVW MEDS BY RX/DR IN RCRD: CPT | Performed by: INTERNAL MEDICINE

## 2023-09-01 PROCEDURE — G0439 PPPS, SUBSEQ VISIT: HCPCS | Performed by: INTERNAL MEDICINE

## 2023-09-01 PROCEDURE — 1170F FXNL STATUS ASSESSED: CPT | Performed by: INTERNAL MEDICINE

## 2023-09-01 NOTE — PROGRESS NOTES
The ABCs of the Annual Wellness Visit  [facility]    Subjective     Kendall Her is a 77 y.o. male who presents for a  Welcome to Medicare Visit.    The following portions of the patient's history were reviewed and   updated as appropriate: allergies, current medications, past family history, past medical history, past social history, past surgical history, and problem list.     Compared to one year ago, the patient feels his physical   health is the same.    Compared to one year ago, the patient feels his mental   health is the same.    Recent Hospitalizations:  He was not admitted to the hospital during the last year.       Current Medical Providers:  Patient Care Team:  Parish Benitez MD as PCP - General (Internal Medicine)    Outpatient Medications Prior to Visit   Medication Sig Dispense Refill    cholecalciferol (VITAMIN D3) 25 MCG (1000 UT) tablet Take 2 tablets by mouth Daily.       No facility-administered medications prior to visit.       No opioid medication identified on active medication list. I have reviewed chart for other potential  high risk medication/s and harmful drug interactions in the elderly.        Aspirin is not on active medication list.  Aspirin use is not indicated based on review of current medical condition/s. Risk of harm outweighs potential benefits.  .    Patient Active Problem List   Diagnosis    Degeneration of intervertebral disc of cervical region    Osteoporosis    Vitamin D deficiency    Diet-controlled hyperlipidemia    Nocturnal leg cramps    DVT of lower limb, acute    Lumbar disc disease with radiculopathy    TSH elevation    Rectal bleeding    Hematuria    History of deep vein thrombosis (DVT) of lower extremity    Anticoagulated     Advance Care Planning   Advance Care Planning     Advance Directive is on file.         Objective   Vitals:    09/01/23 1455   BP: 114/70   BP Location: Right arm   Patient Position: Sitting   Cuff Size: Adult  "  Pulse: 73   SpO2: 96%   Weight: 80 kg (176 lb 4.8 oz)   Height: 182.9 cm (72.01\")     Estimated body mass index is 23.91 kg/mý as calculated from the following:    Height as of this encounter: 182.9 cm (72.01\").    Weight as of this encounter: 80 kg (176 lb 4.8 oz).    BMI is within normal parameters. No other follow-up for BMI required.      Does the patient have evidence of cognitive impairment?   No    Lab Results   Component Value Date    CHLPL 143 2023    TRIG 108 2023    HDL 61 (H) 2023    LDL 63 2023    VLDL 19 2023          HEALTH RISK ASSESSMENT    Smoking Status:  Social History     Tobacco Use   Smoking Status Never   Smokeless Tobacco Never     Alcohol Consumption:  Social History     Substance and Sexual Activity   Alcohol Use Yes    Alcohol/week: 2.0 standard drinks    Types: 1 Cans of beer, 1 Drinks containing 0.5 oz of alcohol per week    Comment: low interest in alcoholic beverages       Fall Risk Screen:    JOANNA Fall Risk Assessment was completed, and patient is at LOW risk for falls.Assessment completed on:2023    Depression Screen:       2023     2:55 PM   PHQ-2/PHQ-9 Depression Screening   Little Interest or Pleasure in Doing Things 0-->not at all   Feeling Down, Depressed or Hopeless 0-->not at all   PHQ-9: Brief Depression Severity Measure Score 0       Health Habits and Functional and Cognitive Screenin/1/2023     2:54 PM   Functional & Cognitive Status   Do you have difficulty preparing food and eating? No   Do you have difficulty bathing yourself, getting dressed or grooming yourself? No   Do you have difficulty using the toilet? No   Do you have difficulty moving around from place to place? No   Do you have trouble with steps or getting out of a bed or a chair? No   Current Diet Well Balanced Diet   Dental Exam Up to date   Eye Exam Unknown   Exercise (times per week) 4 times per week   Current Exercises Include Gardening;Home Fitness " Gym;Walking;Yard Work;Light Weights   Do you need help using the phone?  No   Are you deaf or do you have serious difficulty hearing?  No   Do you need help to go to places out of walking distance? No   Do you need help shopping? No   Do you need help preparing meals?  No   Do you need help with housework?  No   Do you need help with laundry? No   Do you need help taking your medications? No   Do you need help managing money? No   Do you ever drive or ride in a car without wearing a seat belt? No   Have you felt unusual stress, anger or loneliness in the last month? No   Who do you live with? Sibling   If you need help, do you have trouble finding someone available to you? No   Have you been bothered in the last four weeks by sexual problems? No   Do you have difficulty concentrating, remembering or making decisions? No       Visual Acuity:    No results found.    Age-appropriate Screening Schedule:  Refer to the list below for future screening recommendations based on patient's age, sex and/or medical conditions. Orders for these recommended tests are listed in the plan section. The patient has been provided with a written plan.    Health Maintenance   Topic Date Due    ANNUAL WELLNESS VISIT  01/27/2023    DXA SCAN  02/10/2023    ZOSTER VACCINE (2 of 2) 09/01/2023 (Originally 9/2/2022)    COVID-19 Vaccine (3 - Pfizer series) 02/07/2024 (Originally 4/29/2021)    TDAP/TD VACCINES (1 - Tdap) 09/01/2024 (Originally 9/4/1964)    COLORECTAL CANCER SCREENING  01/07/2025 (Originally 1945)    LIPID PANEL  08/04/2024    HEPATITIS C SCREENING  Completed    Pneumococcal Vaccine 65+  Completed    INFLUENZA VACCINE  Discontinued        CMS Preventative Services Quick Reference  Risk Factors Identified During Encounter    None Identified  The above risks/problems have been discussed with the patient.  Pertinent information has been shared with the patient in the After Visit Summary.    Follow Up:   Initial Medicare Visit in  "one year    An After Visit Summary and PPPS were made available to the patient.      Additional E&M Note during same encounter follows:  Patient has multiple medical problems which are significant and separately identifiable that require additional work above and beyond the Medicare Wellness Visit.      Chief Complaint  Medicare Wellness-subsequent    Subjective        Kendall Her is also being seen today to discuss immunizations. He states he will not received any immunizations at any time.     We discussed exercise. He lifts weight occasionally and says he's active, but doesn't necessarily performed any dedicated cardiovascular training.        Objective   Vital Signs:  /70 (BP Location: Right arm, Patient Position: Sitting, Cuff Size: Adult)   Pulse 73   Ht 182.9 cm (72.01\")   Wt 80 kg (176 lb 4.8 oz)   SpO2 96%   BMI 23.91 kg/mý     Physical Exam  Vitals reviewed.   Constitutional:       Appearance: Normal appearance. He is normal weight. He is not ill-appearing.   Pulmonary:      Effort: Pulmonary effort is normal.   Neurological:      Mental Status: He is alert and oriented to person, place, and time.   Psychiatric:         Mood and Affect: Mood normal.         Behavior: Behavior normal.        The following data was reviewed by: Parish Benitez MD on 09/01/2023:  Common labs          1/12/2023    04:08 1/12/2023    06:38 1/26/2023    09:15 8/4/2023    09:17   Common Labs   Glucose 166   88  65    BUN 13   13  11    Creatinine 0.91   0.94  0.95    Sodium 141   145  144    Potassium 3.5   4.8  4.7    Chloride 104   103  106    Calcium 8.8   10.2  9.9    Total Protein   7.1  6.4    Albumin 4.1   4.7  4.4    Total Bilirubin 0.4   0.5  0.7    Alkaline Phosphatase 57   67  70    AST (SGOT) 26   22  24    ALT (SGPT) 26   18  23    WBC 10.96   7.80  5.98    Hemoglobin 14.9   16.5  16.6    Hematocrit 44.7   48.5  48.2    Platelets 196   249  204    Total Cholesterol  133      Total Cholesterol    " 143    Triglycerides  48   108    HDL Cholesterol  61   61    LDL Cholesterol   61   63    Hemoglobin A1C 4.80       PSA   13.100  11.900           Assessment and Plan   Diagnoses and all orders for this visit:    1. Prostate cancer (Primary)  Comments:  Continue active surveillance. Urology following closely.    2. Immunization counseling  Comments:  Declined Tdap, then proceeded to antagonize provider regarding effectiveness of COVID-19 immunizations. Defer immunization discussions indefinitely.    3. Exercise counseling  Comments:  Reviewed ACC/AHA recommendations for aerobic and strength training.           Follow Up   No follow-ups on file.  Patient was given instructions and counseling regarding his condition or for health maintenance advice. Please see specific information pulled into the AVS if appropriate.

## 2023-09-26 NOTE — CASE MANAGEMENT/SOCIAL WORK
Discharge Planning Assessment  [facility]     Patient Name: Kendall Her  MRN: [id number]  Today's Date: 9/2/2021    Admit Date: 9/2/2021    Discharge Needs Assessment     Row Name 09/02/21 1523       Living Environment    Lives With  spouse    Name(s) of Who Lives With Patient  Kasey Her (spouse)    Current Living Arrangements  home/apartment/condo    Primary Care Provided by  self    Family Caregiver if Needed  none    Quality of Family Relationships  helpful;involved;supportive    Able to Return to Prior Arrangements  yes       Resource/Environmental Concerns    Resource/Environmental Concerns  none    Transportation Concerns  car, none       Transition Planning    Patient/Family Anticipates Transition to  home;home with family    Patient/Family Anticipated Services at Transition  education services patient has questions/concerns about continuing Eliquis upon d/c    Transportation Anticipated  family or friend will provide       Discharge Needs Assessment    Readmission Within the Last 30 Days  no previous admission in last 30 days    Equipment Currently Used at Home  none    Concerns to be Addressed  medication    Concerns Comments  patient has questions/concerns about continuing Eliquis upon d/c    Anticipated Changes Related to Illness  none    Equipment Needed After Discharge  none    Provided Post Acute Provider List?  N/A    Provided Post Acute Provider Quality & Resource List?  N/A        Discharge Plan     Row Name 09/02/21 1526       Plan    Plan Comments  Entered room, introduced self and explained role w/PPE in place on self; patient and spouse do not have masks on; verified information on facesheet; patient is independent w/ADL's, lives w/spouse; is retired, still drives and has RX filled at [facility] in [place]; patient here w/syncope and GI Bleed; Patient plans to return home upon d/c w/spouse picking him up- only d/c need at this time is patient has question about continuing Eliquis. Please  insure pharmacy or MD will address and  accordingly.    Final Discharge Disposition Code  01 - home or self-care        Continued Care and Services - Admitted Since 9/2/2021    Coordination has not been started for this encounter.         Demographic Summary     Row Name 09/02/21 1521       General Information    Admission Type  observation    Arrived From  home    Reason for Consult  discharge planning    Preferred Language  English     Used During This Interaction  no       Contact Information    Permission Granted to Share Info With      Contact Information Comments  verified information on facesheet        Functional Status     Row Name 09/02/21 1521       Functional Status    Usual Activity Tolerance  excellent    Current Activity Tolerance  good    Functional Status Comments  patient is very active       Functional Status, IADL    Medications  independent    Meal Preparation  independent    Housekeeping  independent    Laundry  independent    Shopping  independent    IADL Comments  patient is independent w/ADL's       Mental Status    General Appearance WDL  WDL       Mental Status Summary    Recent Changes in Mental Status/Cognitive Functioning  no changes       Employment/    Employment Status  retired;, previous service           Current or Previous  Service  , 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       Abuse/Neglect    No documentation.       Legal    No documentation.       Substance Abuse    No documentation.       Patient Forms    No documentation.           Marylou Fung RN     Azathioprine Counseling:  I discussed with the patient the risks of azathioprine including but not limited to myelosuppression, immunosuppression, hepatotoxicity, lymphoma, and infections.  The patient understands that monitoring is required including baseline LFTs, Creatinine, possible TPMP genotyping and weekly CBCs for the first month and then every 2 weeks thereafter.  The patient verbalized understanding of the proper use and possible adverse effects of azathioprine.  All of the patient's questions and concerns were addressed.

## 2024-02-02 DIAGNOSIS — E78.5 DIET-CONTROLLED HYPERLIPIDEMIA: ICD-10-CM

## 2024-02-02 DIAGNOSIS — R79.89 TSH ELEVATION: Primary | ICD-10-CM

## 2024-02-02 DIAGNOSIS — R97.20 PSA ELEVATION: ICD-10-CM

## 2024-02-02 DIAGNOSIS — Z12.5 ENCOUNTER FOR SCREENING FOR MALIGNANT NEOPLASM OF PROSTATE: ICD-10-CM

## 2024-02-10 LAB
ALBUMIN SERPL-MCNC: 4.4 G/DL (ref 3.8–4.8)
ALBUMIN/GLOB SERPL: 2.1 {RATIO} (ref 1.2–2.2)
ALP SERPL-CCNC: 64 IU/L (ref 44–121)
ALT SERPL-CCNC: 19 IU/L (ref 0–44)
AST SERPL-CCNC: 24 IU/L (ref 0–40)
BILIRUB SERPL-MCNC: 1 MG/DL (ref 0–1.2)
BUN SERPL-MCNC: 13 MG/DL (ref 8–27)
BUN/CREAT SERPL: 12 (ref 10–24)
CALCIUM SERPL-MCNC: 9.4 MG/DL (ref 8.6–10.2)
CHLORIDE SERPL-SCNC: 104 MMOL/L (ref 96–106)
CO2 SERPL-SCNC: 25 MMOL/L (ref 20–29)
CREAT SERPL-MCNC: 1.05 MG/DL (ref 0.76–1.27)
EGFRCR SERPLBLD CKD-EPI 2021: 73 ML/MIN/1.73
GLOBULIN SER CALC-MCNC: 2.1 G/DL (ref 1.5–4.5)
GLUCOSE SERPL-MCNC: 86 MG/DL (ref 70–99)
POTASSIUM SERPL-SCNC: 4.4 MMOL/L (ref 3.5–5.2)
PROT SERPL-MCNC: 6.5 G/DL (ref 6–8.5)
PSA SERPL-MCNC: 13.7 NG/ML (ref 0–4)
SODIUM SERPL-SCNC: 143 MMOL/L (ref 134–144)
TSH SERPL DL<=0.005 MIU/L-ACNC: 5.81 UIU/ML (ref 0.45–4.5)

## 2024-02-11 PROBLEM — R97.20 PSA ELEVATION: Status: RESOLVED | Noted: 2021-01-21 | Resolved: 2024-02-11

## 2024-02-11 PROBLEM — C61 PROSTATE CANCER: Status: ACTIVE | Noted: 2024-02-11

## 2024-02-14 ENCOUNTER — OFFICE VISIT (OUTPATIENT)
Dept: FAMILY MEDICINE CLINIC | Facility: CLINIC | Age: 79
End: 2024-02-14
Payer: MEDICARE

## 2024-02-14 VITALS
RESPIRATION RATE: 16 BRPM | SYSTOLIC BLOOD PRESSURE: 122 MMHG | DIASTOLIC BLOOD PRESSURE: 68 MMHG | BODY MASS INDEX: 24.46 KG/M2 | WEIGHT: 180.6 LBS | HEIGHT: 72 IN | HEART RATE: 72 BPM | OXYGEN SATURATION: 98 %

## 2024-02-14 DIAGNOSIS — M81.0 OSTEOPOROSIS, UNSPECIFIED OSTEOPOROSIS TYPE, UNSPECIFIED PATHOLOGICAL FRACTURE PRESENCE: ICD-10-CM

## 2024-02-14 DIAGNOSIS — C61 PROSTATE CANCER: Primary | ICD-10-CM

## 2024-02-14 PROBLEM — K62.5 RECTAL BLEEDING: Status: RESOLVED | Noted: 2021-09-02 | Resolved: 2024-02-14

## 2024-02-14 PROBLEM — R79.89 TSH ELEVATION: Status: RESOLVED | Noted: 2021-01-21 | Resolved: 2024-02-14

## 2024-02-14 PROBLEM — I82.409 DVT OF LOWER LIMB, ACUTE: Status: RESOLVED | Noted: 2020-08-25 | Resolved: 2024-02-14

## 2024-02-14 PROBLEM — Z79.01 ANTICOAGULATED: Status: RESOLVED | Noted: 2021-09-02 | Resolved: 2024-02-14

## 2024-02-14 PROBLEM — R31.9 HEMATURIA: Status: RESOLVED | Noted: 2021-09-02 | Resolved: 2024-02-14

## 2024-02-14 PROCEDURE — 1159F MED LIST DOCD IN RCRD: CPT | Performed by: INTERNAL MEDICINE

## 2024-02-14 PROCEDURE — 1160F RVW MEDS BY RX/DR IN RCRD: CPT | Performed by: INTERNAL MEDICINE

## 2024-02-14 PROCEDURE — 99214 OFFICE O/P EST MOD 30 MIN: CPT | Performed by: INTERNAL MEDICINE

## 2024-02-14 RX ORDER — ALENDRONATE SODIUM 70 MG/1
70 TABLET ORAL
Qty: 12 TABLET | Refills: 3 | Status: SHIPPED | OUTPATIENT
Start: 2024-02-14 | End: 2025-02-13

## 2024-03-19 ENCOUNTER — PATIENT MESSAGE (OUTPATIENT)
Dept: FAMILY MEDICINE CLINIC | Facility: CLINIC | Age: 79
End: 2024-03-19
Payer: MEDICARE

## (undated) DEVICE — SUT VIC 5/0 PS2 18IN J495H

## (undated) DEVICE — SUT VIC 3/0 TIES 18IN J110T

## (undated) DEVICE — IRRIGATOR BULB ASEPTO 60CC STRL

## (undated) DEVICE — ADHS SKIN DERMABOND TOP ADVANCED

## (undated) DEVICE — NDL HYPO ECLPS SFTY 22G 1 1/2IN

## (undated) DEVICE — DRSNG TELFA PAD NONADH STR 1S 3X4IN

## (undated) DEVICE — DRAPE,REIN 53X77,STERILE: Brand: MEDLINE

## (undated) DEVICE — MAGNETIC DRAPE: Brand: DEVON

## (undated) DEVICE — ELECTRD BLD EDGE/INSUL1P 2.4X5.1MM STRL

## (undated) DEVICE — SKIN PREP TRAY W/CHG: Brand: MEDLINE INDUSTRIES, INC.

## (undated) DEVICE — GLV SURG BIOGEL LTX PF 6 1/2

## (undated) DEVICE — PK ENT MAJ 40

## (undated) DEVICE — SUT VIC 2/0 TIES 18IN J111T

## (undated) DEVICE — SUT VIC 3/0 CT2 27IN J232H

## (undated) DEVICE — DISPOSABLE BIPOLAR FORCEPS 4" (10.2CM) JEWELERS, STRAIGHT 0.4MM TIP AND 12 FT. (3.6M) CABLE: Brand: KIRWAN

## (undated) DEVICE — TOWEL,OR,DSP,ST,BLUE,STD,4/PK,20PK/CS: Brand: MEDLINE

## (undated) DEVICE — DRSNG TELFA PAD NONADH STR 1S 3X8IN

## (undated) DEVICE — STPLR SKIN VISISTAT WD 35CT